# Patient Record
Sex: MALE | Race: WHITE | NOT HISPANIC OR LATINO | Employment: FULL TIME | URBAN - METROPOLITAN AREA
[De-identification: names, ages, dates, MRNs, and addresses within clinical notes are randomized per-mention and may not be internally consistent; named-entity substitution may affect disease eponyms.]

---

## 2021-06-15 ENCOUNTER — APPOINTMENT (EMERGENCY)
Dept: RADIOLOGY | Facility: HOSPITAL | Age: 41
End: 2021-06-15
Payer: COMMERCIAL

## 2021-06-15 ENCOUNTER — HOSPITAL ENCOUNTER (EMERGENCY)
Facility: HOSPITAL | Age: 41
Discharge: HOME | End: 2021-06-15
Attending: EMERGENCY MEDICINE | Admitting: EMERGENCY MEDICINE
Payer: COMMERCIAL

## 2021-06-15 ENCOUNTER — APPOINTMENT (EMERGENCY)
Dept: CT IMAGING | Facility: HOSPITAL | Age: 41
End: 2021-06-15
Payer: COMMERCIAL

## 2021-06-15 VITALS
SYSTOLIC BLOOD PRESSURE: 153 MMHG | DIASTOLIC BLOOD PRESSURE: 96 MMHG | WEIGHT: 210 LBS | HEART RATE: 77 BPM | OXYGEN SATURATION: 98 % | RESPIRATION RATE: 18 BRPM | TEMPERATURE: 98.4 F

## 2021-06-15 DIAGNOSIS — R44.3 HALLUCINATION: Primary | ICD-10-CM

## 2021-06-15 LAB
AMPHETAMINES SERPL QL SCN: NEGATIVE
BARBITURATES UR QL: NEGATIVE
BENZODIAZ UR QL: NEGATIVE
COCAINE UR QL: NEGATIVE
ETHANOL EXG-MCNC: 0 MG/DL
METHADONE UR QL: NEGATIVE
OPIATES UR QL SCN: NEGATIVE
OXYCODONE+OXYMORPHONE UR QL SCN: NEGATIVE
PCP UR QL: NEGATIVE
SARS-COV-2 RNA RESP QL NAA+PROBE: NEGATIVE
THC UR QL: NEGATIVE

## 2021-06-15 PROCEDURE — 70450 CT HEAD/BRAIN W/O DYE: CPT

## 2021-06-15 PROCEDURE — 96372 THER/PROPH/DIAG INJ SC/IM: CPT

## 2021-06-15 PROCEDURE — 82075 ASSAY OF BREATH ETHANOL: CPT

## 2021-06-15 PROCEDURE — 80307 DRUG TEST PRSMV CHEM ANLYZR: CPT | Performed by: EMERGENCY MEDICINE

## 2021-06-15 PROCEDURE — 71045 X-RAY EXAM CHEST 1 VIEW: CPT

## 2021-06-15 PROCEDURE — 99285 EMERGENCY DEPT VISIT HI MDM: CPT | Performed by: EMERGENCY MEDICINE

## 2021-06-15 PROCEDURE — U0003 INFECTIOUS AGENT DETECTION BY NUCLEIC ACID (DNA OR RNA); SEVERE ACUTE RESPIRATORY SYNDROME CORONAVIRUS 2 (SARS-COV-2) (CORONAVIRUS DISEASE [COVID-19]), AMPLIFIED PROBE TECHNIQUE, MAKING USE OF HIGH THROUGHPUT TECHNOLOGIES AS DESCRIBED BY CMS-2020-01-R: HCPCS | Performed by: EMERGENCY MEDICINE

## 2021-06-15 PROCEDURE — 99285 EMERGENCY DEPT VISIT HI MDM: CPT

## 2021-06-15 PROCEDURE — U0005 INFEC AGEN DETEC AMPLI PROBE: HCPCS | Performed by: EMERGENCY MEDICINE

## 2021-06-15 RX ORDER — DIPHENHYDRAMINE HYDROCHLORIDE 50 MG/ML
50 INJECTION INTRAMUSCULAR; INTRAVENOUS ONCE
Status: COMPLETED | OUTPATIENT
Start: 2021-06-15 | End: 2021-06-15

## 2021-06-15 RX ORDER — ATORVASTATIN CALCIUM 40 MG/1
40 TABLET, FILM COATED ORAL DAILY
COMMUNITY
Start: 2021-03-22 | End: 2021-06-30 | Stop reason: HOSPADM

## 2021-06-15 RX ORDER — POLYETHYLENE GLYCOL 3350 17 G/17G
17 POWDER, FOR SOLUTION ORAL DAILY
COMMUNITY
Start: 2021-06-14 | End: 2021-06-30 | Stop reason: HOSPADM

## 2021-06-15 RX ORDER — HYDROXYZINE HYDROCHLORIDE 25 MG/1
25 TABLET, FILM COATED ORAL EVERY 6 HOURS PRN
COMMUNITY
Start: 2021-06-14 | End: 2021-06-30 | Stop reason: HOSPADM

## 2021-06-15 RX ORDER — ZIPRASIDONE MESYLATE 20 MG/ML
20 INJECTION, POWDER, LYOPHILIZED, FOR SOLUTION INTRAMUSCULAR ONCE
Status: COMPLETED | OUTPATIENT
Start: 2021-06-15 | End: 2021-06-15

## 2021-06-15 RX ORDER — KETOROLAC TROMETHAMINE 30 MG/ML
30 INJECTION, SOLUTION INTRAMUSCULAR; INTRAVENOUS ONCE
Status: COMPLETED | OUTPATIENT
Start: 2021-06-15 | End: 2021-06-15

## 2021-06-15 RX ORDER — PREDNISONE 20 MG/1
TABLET ORAL
COMMUNITY
Start: 2021-06-06 | End: 2021-06-15

## 2021-06-15 RX ORDER — ACETAMINOPHEN 500 MG
500 TABLET ORAL EVERY 6 HOURS PRN
COMMUNITY
End: 2021-06-30 | Stop reason: HOSPADM

## 2021-06-15 RX ORDER — ONDANSETRON 4 MG/1
4 TABLET, ORALLY DISINTEGRATING ORAL ONCE
Status: COMPLETED | OUTPATIENT
Start: 2021-06-15 | End: 2021-06-15

## 2021-06-15 RX ORDER — ASPIRIN 81 MG/1
81 TABLET, CHEWABLE ORAL DAILY
COMMUNITY
Start: 2020-12-29 | End: 2021-06-30 | Stop reason: HOSPADM

## 2021-06-15 RX ORDER — DULOXETIN HYDROCHLORIDE 60 MG/1
60 CAPSULE, DELAYED RELEASE ORAL DAILY
COMMUNITY
Start: 2021-03-22 | End: 2021-06-30 | Stop reason: HOSPADM

## 2021-06-15 RX ORDER — LORAZEPAM 2 MG/ML
2 INJECTION INTRAMUSCULAR ONCE
Status: COMPLETED | OUTPATIENT
Start: 2021-06-15 | End: 2021-06-15

## 2021-06-15 RX ADMIN — DIPHENHYDRAMINE HYDROCHLORIDE 50 MG: 50 INJECTION INTRAMUSCULAR; INTRAVENOUS at 00:36

## 2021-06-15 RX ADMIN — KETOROLAC TROMETHAMINE 30 MG: 30 INJECTION, SOLUTION INTRAMUSCULAR at 00:36

## 2021-06-15 RX ADMIN — ONDANSETRON 4 MG: 4 TABLET, ORALLY DISINTEGRATING ORAL at 00:41

## 2021-06-15 RX ADMIN — LORAZEPAM 2 MG: 2 INJECTION INTRAMUSCULAR; INTRAVENOUS at 00:36

## 2021-06-15 RX ADMIN — ZIPRASIDONE MESYLATE 20 MG: 20 INJECTION, POWDER, LYOPHILIZED, FOR SOLUTION INTRAMUSCULAR at 00:35

## 2021-06-15 NOTE — Clinical Note
Consuelo Mtz was seen and treated in our emergency department on 6/15/2021  Diagnosis:     Lucian Last    He may return on this date: 06/18/2021         If you have any questions or concerns, please don't hesitate to call        Nora Ramires RN    ______________________________           _______________          _______________  Hospital Representative                              Date                                Time

## 2021-06-15 NOTE — ED NOTES
Pts wife to go home at this time  Spoke privately with provider and feels as though he would benefit from a crisis consult In the morning due to psychotic behaviour and thoughts at time including hallucinations        Molly Yun RN  06/15/21 4143
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
## 2021-06-15 NOTE — ED NOTES
Per pts wife Felicia Vargas wears CPAP at night , will place on O2 for transient low O2     Bárbara ZACHARIAH Alvarez  06/15/21 2393

## 2021-06-15 NOTE — ED NOTES
Patient in bed resting  The patient states that he is unable to produce a urine sample at this moment  Cup of water given to patient  Patient will give sample when he can  Call bell within reach        Sergio Lock RN  06/15/21 0393

## 2021-06-15 NOTE — ED CARE HANDOFF
Emergency Department Sign Out Note        Sign out and transfer of care from [place] [id number]  See Separate Emergency Department note  The patient, Rae Santana, was evaluated by the previous provider for hallucination  Workup Completed:  UDS/UA    ED Course / Workup Pending (followup): Patient awake alert oriented  Wife at bedside discussed with the crisis worker patient is safe to go home provided with outpatient referral wife feel safe to take him home  Denies any suicidal homicidal ideation  Procedures  MDM    Disposition  Final diagnoses:   Hallucination     Time reflects when diagnosis was documented in both MDM as applicable and the Disposition within this note     Time User Action Codes Description Comment    6/15/2021 10:58 AM Maribeth Hathaway Add [R44 3] Hallucination       ED Disposition     ED Disposition Condition Date/Time Comment    Discharge Stable Tue Ankit 15, 2021 10:58 AM Rae Santana discharge to home/self care  Follow-up Information     Follow up With Specialties Details Why Agatha Wong MD Family Medicine Schedule an appointment as soon as possible for a visit in 2 days If symptoms worsen [address]  [address] [place] [address]  [phone]          Patient's Medications   Discharge Prescriptions    No medications on file     No discharge procedures on file         ED Provider  Electronically Signed by     Eleno Rudolph MD  06/15/21 6413

## 2021-06-15 NOTE — ED PROVIDER NOTES
History  Chief Complaint   Patient presents with    Headache    Abdominal Pain     Patient brought in by ambulance, family called because he was curled up, screaming about his headache and nausea  Pt was seen in past day and evaluated by crisis and dc'd  Pt history and ROS limited by pt refusing to answer many questions, and intermittently roaring out loudly, or deflecting questions with sarcasm  Says he is hearing voices, and seeing things, won't say what, denies command auditory, denies si/hi  Prior to Admission Medications   Prescriptions Last Dose Informant Patient Reported? Taking? Ascorbic Acid, Vitamin C, (VITAMIN C) 100 MG tablet   Yes No   Sig: Take 100 mg by mouth daily   DULoxetine (CYMBALTA) 60 mg delayed release capsule   Yes Yes   Sig: Take 60 mg by mouth daily   Folic Acid-Cholecalciferol 1-5000 MG-UNIT TABS   Yes No   Sig: Take by mouth daily   acetaminophen (TYLENOL) 500 mg tablet   Yes No   Sig: Take 500 mg by mouth every 6 (six) hours as needed   aspirin 81 mg chewable tablet   Yes Yes   Sig: Chew 81 mg daily   atorvastatin (LIPITOR) 40 mg tablet   Yes Yes   Sig: Take 40 mg by mouth daily   hydrOXYzine HCL (ATARAX) 25 mg tablet   Yes Yes   Sig: Take 25 mg by mouth every 6 (six) hours as needed   polyethylene glycol (GLYCOLAX) 17 GM/SCOOP powder   Yes Yes   Sig: Take 17 g by mouth daily   predniSONE 20 mg tablet   Yes No   Sig: Take by mouth      Facility-Administered Medications: None       History reviewed  No pertinent past medical history  History reviewed  No pertinent surgical history  History reviewed  No pertinent family history  I have reviewed and agree with the history as documented      E-Cigarette/Vaping     E-Cigarette/Vaping Substances     Social History     Tobacco Use    Smoking status: Former Smoker    Smokeless tobacco: Never Used   Substance Use Topics    Alcohol use: Not Currently    Drug use: Never       Review of Systems   Unable to perform ROS: Psychiatric disorder       Physical Exam  Physical Exam  Constitutional:       General: He is not in acute distress  HENT:      Head: Normocephalic and atraumatic  Eyes:      Extraocular Movements: Extraocular movements intact  Conjunctiva/sclera: Conjunctivae normal       Pupils: Pupils are equal, round, and reactive to light  Cardiovascular:      Rate and Rhythm: Regular rhythm  Heart sounds: Normal heart sounds  Pulmonary:      Effort: Pulmonary effort is normal       Breath sounds: Normal breath sounds  Abdominal:      General: Bowel sounds are normal       Palpations: Abdomen is soft  There is no mass  Tenderness: There is no abdominal tenderness  There is no guarding  Genitourinary:     Testes:         Right: Tenderness or swelling not present  Left: Tenderness or swelling not present  Musculoskeletal:      Cervical back: Normal range of motion and neck supple  Skin:     General: Skin is warm and dry  Capillary Refill: Capillary refill takes less than 2 seconds  Neurological:      General: No focal deficit present  Mental Status: He is alert  Cranial Nerves: No cranial nerve deficit  Motor: No weakness     Psychiatric:      Comments: Roaring out very loudly, including in the middle of questions to him, intermittently will follow instructions, at other times, lies face down and refuses to follow         Vital Signs  ED Triage Vitals   Temperature Pulse Respirations Blood Pressure SpO2   06/15/21 0019 06/15/21 0019 06/15/21 0019 06/15/21 0019 06/15/21 0019   (!) 97 °F (36 1 °C) (!) 120 (!) 24 136/93 99 %      Temp Source Heart Rate Source Patient Position - Orthostatic VS BP Location FiO2 (%)   06/15/21 0742 06/15/21 0019 06/15/21 0300 06/15/21 0300 --   Oral Monitor Lying Right arm       Pain Score       06/15/21 0019       Worst Possible Pain           Vitals:    06/15/21 0100 06/15/21 0130 06/15/21 0300 06/15/21 0742   BP: 136/82 121/78 113/76 153/96 Pulse: 92 95 85 77   Patient Position - Orthostatic VS:   Lying          Visual Acuity      ED Medications  Medications   ziprasidone (GEODON) IM injection 20 mg (20 mg Intramuscular Given 6/15/21 0035)   diphenhydrAMINE (BENADRYL) injection 50 mg (50 mg Intramuscular Given 6/15/21 0036)   LORazepam (ATIVAN) injection 2 mg (2 mg Intramuscular Given 6/15/21 0036)   ketorolac (TORADOL) injection 30 mg (30 mg Intramuscular Given 6/15/21 0036)   ondansetron (ZOFRAN-ODT) dispersible tablet 4 mg (4 mg Oral Given 6/15/21 0041)       Diagnostic Studies  Results Reviewed     Procedure Component Value Units Date/Time    Rapid drug screen, urine [[id number]]  (Normal) Collected: 06/15/21 0842    Lab Status: Final result Specimen: Urine, Clean Catch Updated: 06/15/21 0857     Amph/Meth UR Negative     Barbiturate Ur Negative     Benzodiazepine Urine Negative     Cocaine Urine Negative     Methadone Urine Negative     Opiate Urine Negative     PCP Ur Negative     THC Urine Negative     Oxycodone Urine Negative    Narrative:      FOR MEDICAL PURPOSES ONLY  IF CONFIRMATION NEEDED PLEASE CONTACT THE LAB WITHIN 5 DAYS  Drug Screen Cutoff Levels:  AMPHETAMINE/METHAMPHETAMINES  1000 ng/mL  BARBITURATES     200 ng/mL  BENZODIAZEPINES     200 ng/mL  COCAINE      300 ng/mL  METHADONE      300 ng/mL  OPIATES      300 ng/mL  PHENCYCLIDINE     25 ng/mL  THC       50 ng/mL  OXYCODONE      100 ng/mL    Novel Coronavirus (Covid-19),PCR [facility] - 2 Hour Stat [[id number]]  (Normal) Collected: 06/15/21 0606    Lab Status: Final result Specimen: Nasopharyngeal Swab Updated: 06/15/21 0720     SARS-CoV-2 Negative    Narrative: The specimen collection materials, transport medium, and/or testing methodology utilized in the production of these test results have been proven to be reliable in a limited validation with an abbreviated program under the Emergency Utilization Authorization provided by the FDA    Testing reported as "Presumptive positive" will be confirmed with secondary testing to ensure result accuracy  Clinical caution and judgement should be used with the interpretation of these results with consideration of the clinical impression and other laboratory testing  Testing reported as "Positive" or "Negative" has been proven to be accurate according to standard laboratory validation requirements  All testing is performed with control materials showing appropriate reactivity at standard intervals  POCT alcohol breath test [[id number]]  (Normal) Resulted: 06/15/21 0610    Lab Status: Final result Updated: 06/15/21 0610     EXTBreath Alcohol 0 000                 CT head without contrast   Final Result by Xiao Dhaliwal MD (06/15 4385)      No acute intracranial hemorrhage, midline shift, or mass effect  Workstation performed: [id number]         XR chest 1 view portable   ED Interpretation by Parth Montalvo MD (06/15 0246)   Clipped left lung but on visible fields no acute abnormalities seen      Final Result by Mando Baltazar MD (06/15 5841)      No acute cardiopulmonary disease  Workstation performed: [id number]                    Procedures  Procedures         ED Course  ED Course as of Ankit 15 2323   Tue Ankit 15, 2021   0121 Wife and sister came to talk with me  Pt has PMH of multiple TBIs, Anxiety, HTN  On Cymbalta  Given prednisone 6/7/21 and after that developed agitation, insomnia, paranoia, anxiety  Believes people at work are plotting against him to ruin his reputation and career  Has also had constipation, although had lare BM 3 days ago  They have had no indications of Si/HI  They were not aware of hallucinations which patient described to me  0246 Medically clear for inpatient psych                                              MDM  Number of Diagnoses or Management Options  Hallucination  Diagnosis management comments: Psychosis, agitation, headache and nausea   No focal neuro findings  Had extensive labs yesterday, not repeated today  Checking CT head for 1st time psychosis  Signed out 0700 to IA      Disposition  Final diagnoses:   Hallucination     Time reflects when diagnosis was documented in both MDM as applicable and the Disposition within this note     Time User Action Codes Description Comment    6/15/2021 10:58 AM Bryan Orosco Add [R44 3] Hallucination       ED Disposition     ED Disposition Condition Date/Time Comment    Discharge Stable Tue Ankit 15, 2021 10:58 AM Gato Ridmc discharge to home/self care              MD Documentation      Most Recent Value   Sending MD Chintan Hassan      Follow-up Information     Follow up With Specialties Details Why Ally Elliott MD Family Medicine Schedule an appointment as soon as possible for a visit in 2 days If symptoms worsen Jose F Matamorosdentawanna [facility] [address] [address]  [phone]            Discharge Medication List as of 6/15/2021 10:58 AM      CONTINUE these medications which have NOT CHANGED    Details   aspirin 81 mg chewable tablet Chew 81 mg daily, Starting Tue 12/29/2020, Until Wed 12/29/2021, Historical Med      atorvastatin (LIPITOR) 40 mg tablet Take 40 mg by mouth daily, Starting Mon 3/22/2021, Until Sun 6/20/2021, Historical Med      DULoxetine (CYMBALTA) 60 mg delayed release capsule Take 60 mg by mouth daily, Starting Mon 3/22/2021, Until Sun 6/20/2021, Historical Med      hydrOXYzine HCL (ATARAX) 25 mg tablet Take 25 mg by mouth every 6 (six) hours as needed, Starting Mon 6/14/2021, Historical Med      polyethylene glycol (GLYCOLAX) 17 GM/SCOOP powder Take 17 g by mouth daily, Starting Mon 6/14/2021, Until Thu 6/17/2021, Historical Med      acetaminophen (TYLENOL) 500 mg tablet Take 500 mg by mouth every 6 (six) hours as needed, Historical Med      Ascorbic Acid, Vitamin C, (VITAMIN C) 100 MG tablet Take 100 mg by mouth daily, Historical Med      Folic Acid-Cholecalciferol 1-5000 MG-UNIT TABS Take by mouth daily, Historical Med      predniSONE 20 mg tablet Take by mouth, Starting Sun 6/6/2021, Until Tue 6/15/2021 at 2359, Historical Med           No discharge procedures on file      PDMP Review     None          ED Provider  Electronically Signed by           Sandra Skaggs MD  06/15/21 5357

## 2021-06-15 NOTE — ED NOTES
[facility]    Name and Date of Birth:  Gato Thompson 36 y o  1980    Date of Referral: Keyla 15, 2021    Presenting Symptoms and Stressors:      Symptoms:  anxiety and mood swings  Stressors:  stress at work and medical problems    Access to Weapons:  No    Smoking Status: denies use    Substance Use:  None    Suicidal Ideation: None    Homicidal Ideation: None    Depressed Mood: depressed mood, low energy, mood swings, irritability    Batool/Hypomania: None    Psychosis: paranoid thoughts, paranoid delusions    Agitation: agitation, severe anxiety, restlessness    Appetite Changes: normal appetite    Sleep Disturbance: decreased sleep    Diagnoses:  1   Intermittent Explosive Disorder    Current Psychiatrist or Therapist:    Psychiatrist: None  Therapist: None    Do they Require Ambulatory Assistance: No    Communication Assistance: not required     Legal Issues: None        Judd Prieto

## 2021-06-15 NOTE — ED NOTES
Pt given sip of water , immediately forcefully spit all over this RN disregarding emesis bag , pt stated "im vomiting"      Gunnar Baer, ZACHARIAH  06/15/21 9769

## 2021-06-15 NOTE — ED NOTES
Pt thrashing around bed , uncooperative with providers exam at times   Pt screaming and cursing , pt yelling repeatedly "where is my wife, what is taking her so f*ckin [id number], RN  06/15/21 0107

## 2021-06-15 NOTE — ED NOTES
Patient given a urine cup and patient was unable to void in the cup       Odalis Guillaume RN  06/15/21 9657

## 2021-06-16 ENCOUNTER — TELEPHONE (OUTPATIENT)
Dept: PSYCHOLOGY | Facility: CLINIC | Age: 41
End: 2021-06-16

## 2021-06-16 NOTE — TELEPHONE ENCOUNTER
Called him again after 3 pm to offer PHP since patient had asked to call after 3 and left voicemail asking to return my call to schedule PHP      Dayna Hernández

## 2021-06-18 ENCOUNTER — HOSPITAL ENCOUNTER (EMERGENCY)
Facility: HOSPITAL | Age: 41
Discharge: HOME | End: 2021-06-19
Attending: INTERNAL MEDICINE | Admitting: INTERNAL MEDICINE
Payer: COMMERCIAL

## 2021-06-18 ENCOUNTER — APPOINTMENT (EMERGENCY)
Dept: CT IMAGING | Facility: HOSPITAL | Age: 41
End: 2021-06-18
Payer: COMMERCIAL

## 2021-06-18 DIAGNOSIS — F41.9 ANXIETY: Primary | ICD-10-CM

## 2021-06-18 LAB
ALBUMIN SERPL BCP-MCNC: 4.6 G/DL (ref 3.5–5.7)
ALP SERPL-CCNC: 71 U/L (ref 40–150)
ALT SERPL W P-5'-P-CCNC: 21 U/L (ref 7–52)
AMPHETAMINES SERPL QL SCN: NEGATIVE
ANION GAP SERPL CALCULATED.3IONS-SCNC: 9 MMOL/L (ref 4–13)
AST SERPL W P-5'-P-CCNC: 21 U/L (ref 13–39)
BARBITURATES UR QL: NEGATIVE
BASOPHILS # BLD AUTO: 0 THOUSANDS/ΜL (ref 0–0.1)
BASOPHILS NFR BLD AUTO: 0 % (ref 0–2)
BENZODIAZ UR QL: NEGATIVE
BILIRUB SERPL-MCNC: 1 MG/DL (ref 0.2–1)
BILIRUB UR QL STRIP: NEGATIVE
BUN SERPL-MCNC: 8 MG/DL (ref 7–25)
CALCIUM SERPL-MCNC: 9.5 MG/DL (ref 8.6–10.5)
CHLORIDE SERPL-SCNC: 99 MMOL/L (ref 98–107)
CLARITY UR: CLEAR
CO2 SERPL-SCNC: 28 MMOL/L (ref 21–31)
COCAINE UR QL: NEGATIVE
COLOR UR: ABNORMAL
CREAT SERPL-MCNC: 0.97 MG/DL (ref 0.7–1.3)
EOSINOPHIL # BLD AUTO: 0.2 THOUSAND/ΜL (ref 0–0.61)
EOSINOPHIL NFR BLD AUTO: 2 % (ref 0–5)
ERYTHROCYTE [DISTWIDTH] IN BLOOD BY AUTOMATED COUNT: 13.2 % (ref 11.5–14.5)
ETHANOL SERPL-MCNC: <10 MG/DL
GFR SERPL CREATININE-BSD FRML MDRD: 97 ML/MIN/1.73SQ M
GLUCOSE SERPL-MCNC: 113 MG/DL (ref 65–99)
GLUCOSE UR STRIP-MCNC: NEGATIVE MG/DL
HCT VFR BLD AUTO: 43.1 % (ref 42–47)
HGB BLD-MCNC: 14.5 G/DL (ref 14–18)
HGB UR QL STRIP.AUTO: NEGATIVE
KETONES UR STRIP-MCNC: NEGATIVE MG/DL
LACTATE SERPL-SCNC: 1.2 MMOL/L (ref 0.5–2)
LEUKOCYTE ESTERASE UR QL STRIP: NEGATIVE
LIPASE SERPL-CCNC: 60 U/L (ref 11–82)
LYMPHOCYTES # BLD AUTO: 2.3 THOUSANDS/ΜL (ref 0.6–4.47)
LYMPHOCYTES NFR BLD AUTO: 18 % (ref 21–51)
MCH RBC QN AUTO: 30.5 PG (ref 26–34)
MCHC RBC AUTO-ENTMCNC: 33.6 G/DL (ref 31–37)
MCV RBC AUTO: 91 FL (ref 81–99)
METHADONE UR QL: NEGATIVE
MONOCYTES # BLD AUTO: 1 THOUSAND/ΜL (ref 0.17–1.22)
MONOCYTES NFR BLD AUTO: 8 % (ref 2–12)
NEUTROPHILS # BLD AUTO: 9.1 THOUSANDS/ΜL (ref 1.4–6.5)
NEUTS SEG NFR BLD AUTO: 72 % (ref 42–75)
NITRITE UR QL STRIP: NEGATIVE
OPIATES UR QL SCN: NEGATIVE
OXYCODONE+OXYMORPHONE UR QL SCN: NEGATIVE
PCP UR QL: NEGATIVE
PH UR STRIP.AUTO: 7 [PH]
PLATELET # BLD AUTO: 288 THOUSANDS/UL (ref 149–390)
PMV BLD AUTO: 7.4 FL (ref 8.6–11.7)
POTASSIUM SERPL-SCNC: 3.8 MMOL/L (ref 3.5–5.5)
PROT SERPL-MCNC: 7.2 G/DL (ref 6.4–8.9)
PROT UR STRIP-MCNC: NEGATIVE MG/DL
RBC # BLD AUTO: 4.75 MILLION/UL (ref 4.3–5.9)
SODIUM SERPL-SCNC: 136 MMOL/L (ref 134–143)
SP GR UR STRIP.AUTO: <=1.005 (ref 1–1.03)
THC UR QL: NEGATIVE
TSH SERPL DL<=0.05 MIU/L-ACNC: 1.04 UIU/ML (ref 0.45–5.33)
UROBILINOGEN UR QL STRIP.AUTO: 0.2 E.U./DL
WBC # BLD AUTO: 12.6 THOUSAND/UL (ref 4.8–10.8)

## 2021-06-18 PROCEDURE — 99285 EMERGENCY DEPT VISIT HI MDM: CPT

## 2021-06-18 PROCEDURE — 83605 ASSAY OF LACTIC ACID: CPT | Performed by: INTERNAL MEDICINE

## 2021-06-18 PROCEDURE — 81003 URINALYSIS AUTO W/O SCOPE: CPT | Performed by: INTERNAL MEDICINE

## 2021-06-18 PROCEDURE — 85025 COMPLETE CBC W/AUTO DIFF WBC: CPT | Performed by: INTERNAL MEDICINE

## 2021-06-18 PROCEDURE — 74177 CT ABD & PELVIS W/CONTRAST: CPT

## 2021-06-18 PROCEDURE — 99285 EMERGENCY DEPT VISIT HI MDM: CPT | Performed by: INTERNAL MEDICINE

## 2021-06-18 PROCEDURE — 36415 COLL VENOUS BLD VENIPUNCTURE: CPT | Performed by: INTERNAL MEDICINE

## 2021-06-18 PROCEDURE — 82077 ASSAY SPEC XCP UR&BREATH IA: CPT | Performed by: INTERNAL MEDICINE

## 2021-06-18 PROCEDURE — 80053 COMPREHEN METABOLIC PANEL: CPT | Performed by: INTERNAL MEDICINE

## 2021-06-18 PROCEDURE — 93005 ELECTROCARDIOGRAM TRACING: CPT

## 2021-06-18 PROCEDURE — 80307 DRUG TEST PRSMV CHEM ANLYZR: CPT | Performed by: INTERNAL MEDICINE

## 2021-06-18 PROCEDURE — 96374 THER/PROPH/DIAG INJ IV PUSH: CPT

## 2021-06-18 PROCEDURE — G1004 CDSM NDSC: HCPCS

## 2021-06-18 PROCEDURE — 84443 ASSAY THYROID STIM HORMONE: CPT | Performed by: INTERNAL MEDICINE

## 2021-06-18 PROCEDURE — 83690 ASSAY OF LIPASE: CPT | Performed by: INTERNAL MEDICINE

## 2021-06-18 RX ORDER — LORAZEPAM 2 MG/ML
1 INJECTION INTRAMUSCULAR ONCE
Status: COMPLETED | OUTPATIENT
Start: 2021-06-18 | End: 2021-06-18

## 2021-06-18 RX ADMIN — IOHEXOL 100 ML: 350 INJECTION, SOLUTION INTRAVENOUS at 21:18

## 2021-06-18 RX ADMIN — LORAZEPAM 1 MG: 2 INJECTION INTRAMUSCULAR; INTRAVENOUS at 18:19

## 2021-06-18 NOTE — ED PROVIDER NOTES
History  Chief Complaint   Patient presents with    Psychiatric Evaluation     PT STATES WAS DX WITH MEDICATION INDUCED PSYCHOSIS BY PCP , states he is hallucinating , hearing voices, has "lost all control of body" , states has headache      36YEAR-OLD MALE ACCOMPANIED BY HIS WIFE PRESENTS WITH MULTIPLE MEDICAL COMPLAINTS  Yusef Grady Patient states he is hypersensitive to any noise he states he can barely walk across the carpet without crane gene, he also hears noises that her for away as if there in his ear  His wife believes he is also having auditory hallucinations which he denies  Does complain of headache, also complaining of abdominal pain intermittently with constipation his last bowel movement large bowel movement was Tuesday night into Wednesday morning when he defecated on himself  PATIENT STATES HE HAS NOT HAD A GOOD BOWEL MOVEMENT SINCE AND HAS INTERMITTENT DIFFUSE ABDOMINAL PAIN  The patient is also complaining of urinary frequency urgency with burning upon urination  He has no flank pain, no nausea vomiting or fever  He denies cough, respiratory difficulty chest pain  He states his problem started when he was put on prednisone for poison ivy the prednisone was stopped a few days ago  Today he took double his amount of medication by accident as per his wife and the patient  Prior to Admission Medications   Prescriptions Last Dose Informant Patient Reported? Taking?    Ascorbic Acid, Vitamin C, (VITAMIN C) 100 MG tablet   Yes No   Sig: Take 100 mg by mouth daily   DULoxetine (CYMBALTA) 60 mg delayed release capsule   Yes No   Sig: Take 60 mg by mouth daily   Folic Acid-Cholecalciferol 1-5000 MG-UNIT TABS   Yes No   Sig: Take by mouth daily   acetaminophen (TYLENOL) 500 mg tablet   Yes No   Sig: Take 500 mg by mouth every 6 (six) hours as needed   aspirin 81 mg chewable tablet   Yes No   Sig: Chew 81 mg daily   atorvastatin (LIPITOR) 40 mg tablet   Yes No   Sig: Take 40 mg by mouth daily hydrOXYzine HCL (ATARAX) 25 mg tablet   Yes No   Sig: Take 25 mg by mouth every 6 (six) hours as needed   polyethylene glycol (GLYCOLAX) 17 GM/SCOOP powder   Yes No   Sig: Take 17 g by mouth daily      Facility-Administered Medications: None       No past medical history on file  No past surgical history on file  No family history on file  I have reviewed and agree with the history as documented  E-Cigarette/Vaping     E-Cigarette/Vaping Substances     Social History     Tobacco Use    Smoking status: Former Smoker    Smokeless tobacco: Never Used   Substance Use Topics    Alcohol use: Not Currently    Drug use: Never       Review of Systems   Constitutional: Positive for activity change and fatigue  HENT: Negative  Respiratory: Negative  Gastrointestinal: Positive for abdominal pain and constipation  Negative for nausea and vomiting  Genitourinary: Positive for difficulty urinating, frequency and urgency  Skin: Negative  Neurological: Positive for headaches  Hematological: Negative  Psychiatric/Behavioral: Positive for hallucinations and sleep disturbance  The patient is nervous/anxious  Physical Exam  Physical Exam  Vitals and nursing note reviewed  Exam conducted with a chaperone present  Constitutional:       General: He is not in acute distress  Appearance: Normal appearance  He is normal weight  He is not ill-appearing  HENT:      Head: Normocephalic and atraumatic  Right Ear: Tympanic membrane normal       Left Ear: Tympanic membrane normal       Mouth/Throat:      Mouth: Mucous membranes are dry  Eyes:      Comments: Right eye is absent  Cardiovascular:      Rate and Rhythm: Normal rate and regular rhythm  Pulses: Normal pulses  Heart sounds: Normal heart sounds  Pulmonary:      Effort: Pulmonary effort is normal       Breath sounds: Normal breath sounds  Abdominal:      General: Abdomen is flat   Bowel sounds are normal  Palpations: Abdomen is soft  Musculoskeletal:         General: Normal range of motion  Cervical back: Normal range of motion and neck supple  Skin:     General: Skin is warm and dry  Neurological:      General: No focal deficit present  Mental Status: He is alert and oriented to person, place, and time  Psychiatric:         Mood and Affect: Mood normal          Thought Content: Thought content normal          Judgment: Judgment normal          Vital Signs  ED Triage Vitals [06/18/21 1729]   Temperature Pulse Respirations Blood Pressure SpO2   98 °F (36 7 °C) 92 18 (!) 159/103 100 %      Temp src Heart Rate Source Patient Position - Orthostatic VS BP Location FiO2 (%)   -- Monitor -- Left arm --      Pain Score       --           Vitals:    06/18/21 1729   BP: (!) 159/103   Pulse: 92         Visual Acuity      ED Medications  Medications - No data to display    Diagnostic Studies  Results Reviewed     None                 No orders to display              Procedures  ECG 12 Lead Documentation Only    Date/Time: 6/18/2021 8:58 PM  Performed by: Kera Oneal MD  Authorized by: Kera Oneal MD     Indications / Diagnosis:  MS change  ECG reviewed by me, the ED Provider: yes    Patient location:  ED  Previous ECG:     Previous ECG:  Compared to current    Similarity:  No change  Interpretation:     Interpretation: normal    Rate:     ECG rate:      ECG rate assessment: normal    Rhythm:     Rhythm: sinus rhythm    Ectopy:     Ectopy: none    QRS:     QRS axis:  Normal  Conduction:     Conduction: normal    ST segments:     ST segments:  Normal  T waves:     T waves: non-specific               ED Course                                           MDM    Disposition  Final diagnoses:   None     ED Disposition     None      Follow-up Information    None         Patient's Medications   Discharge Prescriptions    No medications on file     No discharge procedures on file      PDMP Review     None ED Provider  Electronically Signed by           Nicole Saeed MD  06/18/21 4471

## 2021-06-19 VITALS
DIASTOLIC BLOOD PRESSURE: 93 MMHG | TEMPERATURE: 98 F | HEART RATE: 112 BPM | OXYGEN SATURATION: 100 % | RESPIRATION RATE: 19 BRPM | WEIGHT: 210 LBS | SYSTOLIC BLOOD PRESSURE: 137 MMHG

## 2021-06-19 LAB
ATRIAL RATE: 104 BPM
ATRIAL RATE: 98 BPM
P AXIS: 31 DEGREES
P AXIS: 39 DEGREES
PR INTERVAL: 138 MS
PR INTERVAL: 148 MS
QRS AXIS: 32 DEGREES
QRS AXIS: 35 DEGREES
QRSD INTERVAL: 76 MS
QRSD INTERVAL: 80 MS
QT INTERVAL: 350 MS
QT INTERVAL: 360 MS
QTC INTERVAL: 459 MS
QTC INTERVAL: 460 MS
T WAVE AXIS: 11 DEGREES
T WAVE AXIS: 6 DEGREES
VENTRICULAR RATE: 104 BPM
VENTRICULAR RATE: 98 BPM

## 2021-06-19 PROCEDURE — 96376 TX/PRO/DX INJ SAME DRUG ADON: CPT

## 2021-06-19 PROCEDURE — 93010 ELECTROCARDIOGRAM REPORT: CPT | Performed by: INTERNAL MEDICINE

## 2021-06-19 RX ORDER — LORAZEPAM 2 MG/ML
1 INJECTION INTRAMUSCULAR ONCE
Status: COMPLETED | OUTPATIENT
Start: 2021-06-19 | End: 2021-06-19

## 2021-06-19 RX ADMIN — LORAZEPAM 1 MG: 2 INJECTION INTRAMUSCULAR; INTRAVENOUS at 01:24

## 2021-06-19 NOTE — ED NOTES
Patient reports he hasn't been sleeping well so if he falls asleep - please don't awake him and let him sleep     Anthony Alva RN  06/19/21 3664

## 2021-06-19 NOTE — ED NOTES
Pennie Suicide Risk Assessment deferred, as unable to assess while patient sleeping  Behavioral Health Assessment deferred as patient is sleeping and would benefit from additional rest   Vital signs deferred until patient awake, no signs or symptoms of respiratory distress at this time  Once patient is awake and able to participate, will complete assessments         Mike Montes RN  06/19/21 2488
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
## 2021-06-19 NOTE — ED NOTES
[place] PROMISe indicates: Active  Recipient #[id number]  [facility] managed by [facility]  Phone number: [phone]  Primary payor is [address]  Eligibility verified via [address] [place] [address] managed by [employer]  Phone number: [phone]

## 2021-06-19 NOTE — ED NOTES
Requested to speak to MD - MD at bedside - patient requested more food - will provide with more food     Celsa Meyers RN  06/19/21 0041

## 2021-06-19 NOTE — ED NOTES
Patient requested to get oob to go to the bathroom to poop - assisted to bathroom and back to bed at his request     Juan Ramon Love RN  06/19/21 2752

## 2021-06-19 NOTE — ED NOTES
Patient requested food - patient reports his mind is racing and he thinks his sister n law was here - food was provided - his urinal emptied     Antoni Graff RN  06/19/21 0026

## 2021-06-19 NOTE — ED NOTES
Patient's wife will be leaving for the night - she reports at times he has nightmares and screams - sometimes she can wake him up and sometimes she can't when this happens     Celso Ferrell RN  06/18/21 3292

## 2021-06-19 NOTE — DISCHARGE INSTRUCTIONS
Follow all instructions given by the crisis team, return to er with any other concerns, thoughts of self harm or hurting others

## 2021-06-19 NOTE — ED NOTES
Patient ate his food and went to bathroom and cleaned up room - talking about the job site and how it gave him super sensitive hearing - he reports he could hear a rain hammer in his head - instructed patient on time of day and medicated so he relax - sleep     Josi Araujo RN  06/19/21 0123

## 2021-06-20 ENCOUNTER — HOSPITAL ENCOUNTER (EMERGENCY)
Facility: HOSPITAL | Age: 41
End: 2021-06-21
Attending: EMERGENCY MEDICINE | Admitting: EMERGENCY MEDICINE
Payer: COMMERCIAL

## 2021-06-20 DIAGNOSIS — F22 PARANOIA (HCC): ICD-10-CM

## 2021-06-20 DIAGNOSIS — F22 DELUSIONS (HCC): ICD-10-CM

## 2021-06-20 DIAGNOSIS — Z00.8 ENCOUNTER FOR PSYCHOLOGICAL EVALUATION: Primary | ICD-10-CM

## 2021-06-20 DIAGNOSIS — F32.3 SEVERE MAJOR DEPRESSION WITH PSYCHOTIC FEATURES (HCC): ICD-10-CM

## 2021-06-20 LAB
AMPHETAMINES SERPL QL SCN: NEGATIVE
BARBITURATES UR QL: NEGATIVE
BENZODIAZ UR QL: NEGATIVE
BILIRUB UR QL STRIP: NEGATIVE
CLARITY UR: CLEAR
COCAINE UR QL: NEGATIVE
COLOR UR: NORMAL
ETHANOL EXG-MCNC: 0 MG/DL
GLUCOSE UR STRIP-MCNC: NEGATIVE MG/DL
HGB UR QL STRIP.AUTO: NEGATIVE
KETONES UR STRIP-MCNC: NEGATIVE MG/DL
LEUKOCYTE ESTERASE UR QL STRIP: NEGATIVE
METHADONE UR QL: NEGATIVE
NITRITE UR QL STRIP: NEGATIVE
OPIATES UR QL SCN: NEGATIVE
OXYCODONE+OXYMORPHONE UR QL SCN: NEGATIVE
PCP UR QL: NEGATIVE
PH UR STRIP.AUTO: 7 [PH]
PROT UR STRIP-MCNC: NEGATIVE MG/DL
SARS-COV-2 RNA RESP QL NAA+PROBE: NEGATIVE
SP GR UR STRIP.AUTO: 1.01 (ref 1–1.04)
THC UR QL: NEGATIVE
UROBILINOGEN UA: NEGATIVE MG/DL

## 2021-06-20 PROCEDURE — 82075 ASSAY OF BREATH ETHANOL: CPT | Performed by: EMERGENCY MEDICINE

## 2021-06-20 PROCEDURE — 80307 DRUG TEST PRSMV CHEM ANLYZR: CPT | Performed by: EMERGENCY MEDICINE

## 2021-06-20 PROCEDURE — U0005 INFEC AGEN DETEC AMPLI PROBE: HCPCS | Performed by: EMERGENCY MEDICINE

## 2021-06-20 PROCEDURE — 99284 EMERGENCY DEPT VISIT MOD MDM: CPT | Performed by: EMERGENCY MEDICINE

## 2021-06-20 PROCEDURE — U0003 INFECTIOUS AGENT DETECTION BY NUCLEIC ACID (DNA OR RNA); SEVERE ACUTE RESPIRATORY SYNDROME CORONAVIRUS 2 (SARS-COV-2) (CORONAVIRUS DISEASE [COVID-19]), AMPLIFIED PROBE TECHNIQUE, MAKING USE OF HIGH THROUGHPUT TECHNOLOGIES AS DESCRIBED BY CMS-2020-01-R: HCPCS | Performed by: EMERGENCY MEDICINE

## 2021-06-20 PROCEDURE — 99285 EMERGENCY DEPT VISIT HI MDM: CPT

## 2021-06-20 PROCEDURE — 81003 URINALYSIS AUTO W/O SCOPE: CPT | Performed by: EMERGENCY MEDICINE

## 2021-06-20 RX ORDER — LORAZEPAM 1 MG/1
2 TABLET ORAL ONCE
Status: COMPLETED | OUTPATIENT
Start: 2021-06-20 | End: 2021-06-20

## 2021-06-20 RX ORDER — BENZTROPINE MESYLATE 0.5 MG/1
1 TABLET ORAL ONCE
Status: COMPLETED | OUTPATIENT
Start: 2021-06-20 | End: 2021-06-20

## 2021-06-20 RX ORDER — OLANZAPINE 5 MG/1
10 TABLET, ORALLY DISINTEGRATING ORAL ONCE
Status: COMPLETED | OUTPATIENT
Start: 2021-06-20 | End: 2021-06-20

## 2021-06-20 RX ADMIN — OLANZAPINE 10 MG: 5 TABLET, ORALLY DISINTEGRATING ORAL at 13:43

## 2021-06-20 RX ADMIN — LORAZEPAM 2 MG: 1 TABLET ORAL at 13:43

## 2021-06-20 RX ADMIN — BENZTROPINE MESYLATE 1 MG: 0.5 TABLET ORAL at 13:43

## 2021-06-20 NOTE — ED NOTES
Call received from the patient's wife  She stated she wanted to point out that he has been constipated and that he does not have his normal body odor, that she stated it is different  She also stated her mother may be calling  Her name is [employer]   She has the patient's children, ages 15, 15 and 8 as the patient does not want his children to see him in this state  His children would like to call him and wish him a happy father's day and express their support  Notified patient's nurse of same

## 2021-06-20 NOTE — ED NOTES
Patient was brought to the ED by his wife, Genny Hobson [phone], and his sister, Tonja Navas, [phone]  Patient is a limited historian due to current mental status  He had presented to several ED's recently  Patient has a history of depression, PTSD and a history of multiple head traumas  According to the patient sister, the patient has been experiencing for the past 2 weeks, decreased sleep, decreased appetite, agitation, auditory hallucintions and paranoid delusions  He reportedly believes his home is bugged, that his coworkers are conspiring against him due to his involvement with the [employer]  He is also fixated on the corona virus, mask-wearing and testing  In the ED, he was intially quite agitated as he was unsure if we were going to send him home  He had difficulty expressing himself, using approximate words and fragmented ideas  "Covid, put my mask up, nukes, nukes, nukes  Fara Nissen Karrie Nissen Use a prick of blood or saliva  Karrie Nissen Karrie Nissen I want the other katya who was in office, not this one  I'm not a sheep " The patient and his wife seem convinced that these new experiences are related to prednisone dosing for poison ivy or oak; He has been off of this, however  His sister reports complaints of tongue swelling, painful urination, rash and excessive thirst  The couple also suspected a reaction to cymbalta, however, he has been on that medication for at least 3 months  Patient has reportedly been so paranoid that he darted out of a store and down a street becauses he believed he was being followed and was in danger  Today, family used the child locks and put him in the back seat of the vehicle due to fear he would jump out  Patient was referred to [facility] several days ago; however, it seems that he would be unable to tolerate such a setting and to participate due to current mental status   It is notable that the patient has had some significant head trauma but has been resistant to revealing this due to 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

## 2021-06-20 NOTE — ED PROVIDER NOTES
History  Chief Complaint   Patient presents with    Psychiatric Evaluation     Pt presents with paranoid ideations, AH/VH post taking prednisone for 6 days for rash that is still present  No SI/HI  35 yo male with a history of anxiety and HTN brought to the ED by family for a psychiatric evaluation  The patient reports paranoia, delusions, and hallucinations x several days  He says that he feels like he is being followed and hear "voices" even when he is alone  (+) Increased forgetfulness/confusion/fatigue per wife  The patient and his wife sent their 3 children away to stay with relatives today because they are afraid the he might "do something to them"  No current SI or HI  The patient denies any recent alcohol or substance abuse  (+) Secondary complaints of dysuria x "months" and a lower extremity rash  No other specific complaints  Of note, the patient was seen in a different ED in [facility] for similar complaints 2 days ago  He was referred to a partial program but has not yet followed up  Prior to Admission Medications   Prescriptions Last Dose Informant Patient Reported? Taking?    Ascorbic Acid, Vitamin C, (VITAMIN C) 100 MG tablet   Yes No   Sig: Take 100 mg by mouth daily   DULoxetine (CYMBALTA) 60 mg delayed release capsule   Yes No   Sig: Take 60 mg by mouth daily   Folic Acid-Cholecalciferol 1-5000 MG-UNIT TABS   Yes No   Sig: Take by mouth daily   acetaminophen (TYLENOL) 500 mg tablet   Yes No   Sig: Take 500 mg by mouth every 6 (six) hours as needed   aspirin 81 mg chewable tablet   Yes No   Sig: Chew 81 mg daily   atorvastatin (LIPITOR) 40 mg tablet   Yes No   Sig: Take 40 mg by mouth daily   hydrOXYzine HCL (ATARAX) 25 mg tablet   Yes No   Sig: Take 25 mg by mouth every 6 (six) hours as needed   polyethylene glycol (GLYCOLAX) 17 GM/SCOOP powder   Yes No   Sig: Take 17 g by mouth daily      Facility-Administered Medications: None       Past Medical History:   Diagnosis Date    Anxiety  Hypertension        Past Surgical History:   Procedure Laterality Date    CORNEAL TRANSPLANT      EYE SURGERY      TONSILLECTOMY         History reviewed  No pertinent family history  I have reviewed and agree with the history as documented  E-Cigarette/Vaping     E-Cigarette/Vaping Substances     Social History     Tobacco Use    Smoking status: Former Smoker    Smokeless tobacco: Never Used   Substance Use Topics    Alcohol use: Not Currently    Drug use: Never       Review of Systems   Constitutional: Negative for chills and fever  HENT: Negative for sore throat  Respiratory: Negative for cough and shortness of breath  Cardiovascular: Negative for chest pain and palpitations  Gastrointestinal: Negative for abdominal pain, diarrhea, nausea and vomiting  Endocrine: Negative for cold intolerance and heat intolerance  Genitourinary: Positive for dysuria  Negative for flank pain  Musculoskeletal: Negative for back pain  Skin: Positive for rash  Allergic/Immunologic: Negative for immunocompromised state  Neurological: Negative for headaches  Hematological: Negative for adenopathy  Psychiatric/Behavioral: Positive for confusion, decreased concentration, dysphoric mood, hallucinations and sleep disturbance  Negative for self-injury and suicidal ideas  The patient is nervous/anxious  Physical Exam  Physical Exam  Constitutional:       General: He is not in acute distress  Appearance: He is well-developed  HENT:      Head: Normocephalic and atraumatic  Eyes:      Pupils: Pupils are equal, round, and reactive to light  Cardiovascular:      Rate and Rhythm: Normal rate and regular rhythm  Pulmonary:      Effort: Pulmonary effort is normal  No respiratory distress  Breath sounds: Normal breath sounds  Abdominal:      General: There is no distension  Palpations: Abdomen is soft  Tenderness: There is no abdominal tenderness     Musculoskeletal: General: Normal range of motion  Cervical back: Normal range of motion and neck supple  Skin:     General: Skin is warm and dry  Neurological:      Mental Status: He is alert and oriented to person, place, and time  Psychiatric:         Mood and Affect: Mood is anxious  Speech: Speech is rapid and pressured  Behavior: Behavior is cooperative  Thought Content: Thought content is paranoid and delusional  Thought content does not include homicidal or suicidal ideation  Cognition and Memory: Cognition is impaired  Memory is impaired  Judgment: Judgment is impulsive  Vital Signs  ED Triage Vitals [06/20/21 1140]   Temperature Pulse Respirations Blood Pressure SpO2   98 9 °F (37 2 °C) 100 18 149/90 100 %      Temp Source Heart Rate Source Patient Position - Orthostatic VS BP Location FiO2 (%)   Tympanic Monitor Sitting Left arm --      Pain Score       --           Vitals:    06/20/21 1140   BP: 149/90   Pulse: 100   Patient Position - Orthostatic VS: Sitting         Visual Acuity      ED Medications  Medications   LORazepam (ATIVAN) tablet 2 mg (2 mg Oral Given 6/20/21 1343)   OLANZapine (ZyPREXA ZYDIS) dispersible tablet 10 mg (10 mg Oral Given 6/20/21 1343)   benztropine (COGENTIN) tablet 1 mg (1 mg Oral Given 6/20/21 1343)       Diagnostic Studies  Results Reviewed     Procedure Component Value Units Date/Time    Novel Coronavirus (Covid-19),PCR [facility] - 2 hour stat [[id number]] Collected: 06/20/21 1437    Lab Status:  In process Specimen: Nares from Nose Updated: 06/20/21 1443    Rapid drug screen, urine [[id number]]  (Normal) Collected: 06/20/21 1330    Lab Status: Final result Specimen: Urine, Clean Catch Updated: 06/20/21 1411     Amph/Meth UR Negative     Barbiturate Ur Negative     Benzodiazepine Urine Negative     Cocaine Urine Negative     Methadone Urine Negative     Opiate Urine Negative     PCP Ur Negative     THC Urine Negative     Oxycodone Urine Negative    Narrative:      FOR MEDICAL PURPOSES ONLY  IF CONFIRMATION NEEDED PLEASE CONTACT THE LAB WITHIN 5 DAYS  Drug Screen Cutoff Levels:  AMPHETAMINE/METHAMPHETAMINES  1000 ng/mL  BARBITURATES     200 ng/mL  BENZODIAZEPINES     200 ng/mL  COCAINE      300 ng/mL  METHADONE      300 ng/mL  OPIATES      300 ng/mL  PHENCYCLIDINE     25 ng/mL  THC       50 ng/mL  OXYCODONE      100 ng/mL    UA w Reflex to Microscopic w Reflex to Culture [[id number]]  (Normal) Collected: 06/20/21 1330    Lab Status: Final result Specimen: Urine, Clean Catch Updated: 06/20/21 1354     Color, UA Straw     Clarity, UA Clear     Specific Gravity, UA 1 010     pH, UA 7 0     Leukocytes, UA Negative     Nitrite, UA Negative     Protein, UA Negative mg/dl      Glucose, UA Negative mg/dl      Ketones, UA Negative mg/dl      Bilirubin, UA Negative     Blood, UA Negative     UROBILINOGEN UA Negative mg/dL     POCT alcohol breath test [[id number]]  (Normal) Resulted: 06/20/21 1328    Lab Status: Final result Updated: 06/20/21 1328     EXTBreath Alcohol 0 00                 No orders to display              Procedures  Procedures         ED Course                             SBIRT 20yo+      Most Recent Value   SBIRT (23 yo +)   In order to provide better care to our patients, we are screening all of our patients for alcohol and drug use  Would it be okay to ask you these screening questions? No Filed at: 06/20/2021 1308                    MDM  Number of Diagnoses or Management Options  Delusions ([facility] [address] )  [facility]  [facility]  Diagnosis management comments: The patient is obviously anxious but otherwise well appearing with stable vital signs and a benign exam  He is requesting admission to an inpatient psychiatric facility  Case discussed with Crisis --> they will evaluate the patient in the ED  Disposition per crisis worker recommendations         Amount and/or Complexity of Data Reviewed  Clinical lab tests: ordered and reviewed    Patient Progress  Patient progress: stable      Disposition  Final diagnoses:   Encounter for psychological evaluation   Paranoia ([facility] [address] )   Delusions ([facility] )     Time reflects when diagnosis was documented in both MDM as applicable and the Disposition within this note     Time User Action Codes Description Comment    6/20/2021  2:35 PM Gabriel Rodriguez Add [Z00 8] Encounter for psychological evaluation     6/20/2021  2:36 PM Gabriel Rodriguez Add [F22] Paranoia ([facility] )     6/20/2021  2:36 PM Jesusita Ayon Add [F22] Delusions [facility])       ED Disposition     ED Disposition Condition Date/Time Comment    Transfer to [facility] Jun 20, 2021  2:36 PM Dion Watson should be transferred out to inpatient psychiatry and has been medically cleared  Follow-up Information    None         Patient's Medications   Discharge Prescriptions    No medications on file     No discharge procedures on file      PDMP Review     None          ED Provider  Electronically Signed by           Bryan Langston MD  06/20/21 2517

## 2021-06-20 NOTE — ED NOTES
Safety tray provided, pt was sleeping when the tray was brought in     [facility]  06/20/21 4685

## 2021-06-20 NOTE — ED NOTES
Pt annoyed that his visitor has to leave once he is swabbed for Covid  Crisis in speaking with the pt now        Lesa Lloyd RN  06/20/21 3155

## 2021-06-20 NOTE — LETTER
[facility] EMERGENCY DEPARTMENT  [address] [place] [id number]  [phone]  Dept: [address] [facility] CONSENT    NAME Rosalie Wood                                        YASMANI 1980                              MRN [id number]    I have been informed of my rights regarding examination, treatment, and transfer   by Dr Tiera Bethea MD    Benefits:  inpatient treatment    Risks:  delay in care       { ED EMTALA TRANSFER CHOICES:[id number]}    I authorize the performance of emergency medical procedures and treatments upon me in both transit and upon arrival at the receiving facility  Additionally, I authorize the release of any and all medical records to the receiving facility and request they be transported with me, if possible  I understand that the safest mode of transportation during a medical emergency is an ambulance and that the Hospital advocates the use of this mode of transport  Risks of traveling to the receiving facility by car, including absence of medical control, life sustaining equipment, such as oxygen, and medical personnel has been explained to me and I fully understand them  (TAL CORRECT BOX BELOW)  [x  ]  I consent to the stated transfer and to be transported by ambulance/helicopter  [  ]  I consent to the stated transfer, but refuse transportation by ambulance and accept full responsibility for my transportation by car    I understand the risks of non-ambulance transfers and I exonerate the Hospital and its staff from any deterioration in my condition that results from this refusal     X___________________________________________    DATE  21  TIME________  Signature of patient or legally responsible individual signing on patient behalf           RELATIONSHIP TO PATIENT___self______________________          Provider Certification    NAME Rosalie Wood                                        Y[birth year]MANI 1980 MRN [id number]    A medical screening exam was performed on the above named patient  Based on the examination:    Condition Necessitating Transfer The primary encounter diagnosis was Encounter for psychological evaluation  Diagnoses of Paranoia ([address] ) and Delusions ([facility] [address] ) were also pertinent to this visit  Patient Condition:  depression with psychosis    Reason for Transfer:  inpatient admission     Transfer Requirements: Facility   [facility]   · Space available and qualified personnel available for treatment as acknowledged by  RASHID ABRAMS  · Agreed to accept transfer and to provide appropriate medical treatment as acknowledged by        RASHID ABRAMS  · Appropriate medical records of the examination and treatment of the patient are provided at the time of transfer   [facility] [address] ____ap___  · Transfer will be performed by qualified personnel from  [address] [place] [address]   and appropriate transfer equipment as required, including the use of necessary and appropriate life support measures  Provider Certification: I have examined the patient and explained the following risks and benefits of being transferred/refusing transfer to the patient/family:     Voluntary inpatient admission    Based on these reasonable risks and benefits to the patient and/or the unborn child(beverly), and based upon the information available at the time of the patients examination, I certify that the medical benefits reasonably to be expected from the provision of appropriate medical treatments at another medical facility outweigh the increasing risks, if any, to the individuals medical condition, and in the case of labor to the unborn child, from effecting the transfer      X____________________________________________ DATE 06/21/21        TIME_______      ORIGINAL - SEND TO MEDICAL RECORDS   COPY - SEND WITH PATIENT DURING TRANSFER

## 2021-06-21 ENCOUNTER — HOSPITAL ENCOUNTER (INPATIENT)
Facility: HOSPITAL | Age: 41
LOS: 9 days | Discharge: HOME | DRG: 885 | End: 2021-06-30
Attending: STUDENT IN AN ORGANIZED HEALTH CARE EDUCATION/TRAINING PROGRAM | Admitting: STUDENT IN AN ORGANIZED HEALTH CARE EDUCATION/TRAINING PROGRAM
Payer: COMMERCIAL

## 2021-06-21 VITALS
DIASTOLIC BLOOD PRESSURE: 93 MMHG | RESPIRATION RATE: 20 BRPM | HEART RATE: 95 BPM | OXYGEN SATURATION: 99 % | TEMPERATURE: 98.1 F | WEIGHT: 206.2 LBS | SYSTOLIC BLOOD PRESSURE: 143 MMHG

## 2021-06-21 DIAGNOSIS — F32.3 SEVERE MAJOR DEPRESSION WITH PSYCHOTIC FEATURES (HCC): Primary | ICD-10-CM

## 2021-06-21 DIAGNOSIS — E56.9 VITAMIN DEFICIENCY: ICD-10-CM

## 2021-06-21 DIAGNOSIS — E78.5 HLD (HYPERLIPIDEMIA): ICD-10-CM

## 2021-06-21 DIAGNOSIS — R45.1 RESTLESSNESS: ICD-10-CM

## 2021-06-21 RX ORDER — BENZTROPINE MESYLATE 0.5 MG/1
1 TABLET ORAL
Status: CANCELLED | OUTPATIENT
Start: 2021-06-21

## 2021-06-21 RX ORDER — LANOLIN ALCOHOL/MO/W.PET/CERES
3 CREAM (GRAM) TOPICAL
Status: CANCELLED | OUTPATIENT
Start: 2021-06-21

## 2021-06-21 RX ORDER — ASCORBIC ACID 500 MG
250 TABLET ORAL DAILY
Status: DISCONTINUED | OUTPATIENT
Start: 2021-06-22 | End: 2021-06-30 | Stop reason: HOSPADM

## 2021-06-21 RX ORDER — HYDROXYZINE HYDROCHLORIDE 25 MG/1
25 TABLET, FILM COATED ORAL
Status: CANCELLED | OUTPATIENT
Start: 2021-06-21

## 2021-06-21 RX ORDER — ACETAMINOPHEN 325 MG/1
650 TABLET ORAL EVERY 4 HOURS PRN
Status: DISCONTINUED | OUTPATIENT
Start: 2021-06-21 | End: 2021-06-30 | Stop reason: HOSPADM

## 2021-06-21 RX ORDER — ACETAMINOPHEN 325 MG/1
975 TABLET ORAL EVERY 6 HOURS PRN
Status: DISCONTINUED | OUTPATIENT
Start: 2021-06-21 | End: 2021-06-30 | Stop reason: HOSPADM

## 2021-06-21 RX ORDER — ASPIRIN 81 MG/1
81 TABLET ORAL DAILY
Status: DISCONTINUED | OUTPATIENT
Start: 2021-06-21 | End: 2021-06-21 | Stop reason: HOSPADM

## 2021-06-21 RX ORDER — DIPHENHYDRAMINE HYDROCHLORIDE 50 MG/ML
50 INJECTION INTRAMUSCULAR; INTRAVENOUS EVERY 6 HOURS PRN
Status: CANCELLED | OUTPATIENT
Start: 2021-06-21

## 2021-06-21 RX ORDER — ACETAMINOPHEN 325 MG/1
650 TABLET ORAL EVERY 4 HOURS PRN
Status: CANCELLED | OUTPATIENT
Start: 2021-06-21

## 2021-06-21 RX ORDER — LORAZEPAM 2 MG/ML
2 INJECTION INTRAMUSCULAR EVERY 6 HOURS PRN
Status: DISCONTINUED | OUTPATIENT
Start: 2021-06-21 | End: 2021-06-30 | Stop reason: HOSPADM

## 2021-06-21 RX ORDER — HYDROXYZINE 50 MG/1
100 TABLET, FILM COATED ORAL
Status: DISCONTINUED | OUTPATIENT
Start: 2021-06-21 | End: 2021-06-30 | Stop reason: HOSPADM

## 2021-06-21 RX ORDER — LORAZEPAM 2 MG/ML
2 INJECTION INTRAMUSCULAR EVERY 6 HOURS PRN
Status: CANCELLED | OUTPATIENT
Start: 2021-06-21

## 2021-06-21 RX ORDER — ACETAMINOPHEN 325 MG/1
650 TABLET ORAL EVERY 6 HOURS PRN
Status: DISCONTINUED | OUTPATIENT
Start: 2021-06-21 | End: 2021-06-30 | Stop reason: HOSPADM

## 2021-06-21 RX ORDER — ASPIRIN 81 MG/1
81 TABLET ORAL DAILY
Status: DISCONTINUED | OUTPATIENT
Start: 2021-06-22 | End: 2021-06-30 | Stop reason: HOSPADM

## 2021-06-21 RX ORDER — OLANZAPINE 5 MG/1
5 TABLET ORAL
Status: DISCONTINUED | OUTPATIENT
Start: 2021-06-21 | End: 2021-06-21

## 2021-06-21 RX ORDER — FOLIC ACID 1 MG/1
1 TABLET ORAL DAILY
Status: DISCONTINUED | OUTPATIENT
Start: 2021-06-22 | End: 2021-06-30 | Stop reason: HOSPADM

## 2021-06-21 RX ORDER — DULOXETIN HYDROCHLORIDE 30 MG/1
60 CAPSULE, DELAYED RELEASE ORAL DAILY
Status: DISCONTINUED | OUTPATIENT
Start: 2021-06-22 | End: 2021-06-23

## 2021-06-21 RX ORDER — DULOXETIN HYDROCHLORIDE 60 MG/1
60 CAPSULE, DELAYED RELEASE ORAL DAILY
Status: CANCELLED | OUTPATIENT
Start: 2021-06-22

## 2021-06-21 RX ORDER — ASPIRIN 81 MG/1
81 TABLET ORAL DAILY
Status: CANCELLED | OUTPATIENT
Start: 2021-06-22

## 2021-06-21 RX ORDER — AMOXICILLIN 250 MG
1 CAPSULE ORAL DAILY PRN
Status: DISCONTINUED | OUTPATIENT
Start: 2021-06-21 | End: 2021-06-30 | Stop reason: HOSPADM

## 2021-06-21 RX ORDER — DIPHENHYDRAMINE HYDROCHLORIDE 50 MG/ML
50 INJECTION INTRAMUSCULAR; INTRAVENOUS EVERY 6 HOURS PRN
Status: DISCONTINUED | OUTPATIENT
Start: 2021-06-21 | End: 2021-06-30 | Stop reason: HOSPADM

## 2021-06-21 RX ORDER — OLANZAPINE 5 MG/1
5 TABLET ORAL
Status: CANCELLED | OUTPATIENT
Start: 2021-06-21

## 2021-06-21 RX ORDER — OLANZAPINE 10 MG/1
10 INJECTION, POWDER, LYOPHILIZED, FOR SOLUTION INTRAMUSCULAR
Status: DISCONTINUED | OUTPATIENT
Start: 2021-06-21 | End: 2021-06-22

## 2021-06-21 RX ORDER — ASCORBIC ACID 500 MG
250 TABLET ORAL DAILY
Status: DISCONTINUED | OUTPATIENT
Start: 2021-06-21 | End: 2021-06-21 | Stop reason: HOSPADM

## 2021-06-21 RX ORDER — HYDROXYZINE HYDROCHLORIDE 25 MG/1
25 TABLET, FILM COATED ORAL
Status: DISCONTINUED | OUTPATIENT
Start: 2021-06-21 | End: 2021-06-30 | Stop reason: HOSPADM

## 2021-06-21 RX ORDER — OLANZAPINE 10 MG/1
5 INJECTION, POWDER, LYOPHILIZED, FOR SOLUTION INTRAMUSCULAR
Status: CANCELLED | OUTPATIENT
Start: 2021-06-21

## 2021-06-21 RX ORDER — ACETAMINOPHEN 325 MG/1
650 TABLET ORAL EVERY 6 HOURS PRN
Status: CANCELLED | OUTPATIENT
Start: 2021-06-21

## 2021-06-21 RX ORDER — OLANZAPINE 10 MG/1
2.5 INJECTION, POWDER, LYOPHILIZED, FOR SOLUTION INTRAMUSCULAR
Status: DISCONTINUED | OUTPATIENT
Start: 2021-06-21 | End: 2021-06-22

## 2021-06-21 RX ORDER — HYDROXYZINE 50 MG/1
50 TABLET, FILM COATED ORAL
Status: DISCONTINUED | OUTPATIENT
Start: 2021-06-21 | End: 2021-06-30 | Stop reason: HOSPADM

## 2021-06-21 RX ORDER — FOLIC ACID 1 MG/1
1 TABLET ORAL DAILY
Status: DISCONTINUED | OUTPATIENT
Start: 2021-06-21 | End: 2021-06-21 | Stop reason: HOSPADM

## 2021-06-21 RX ORDER — DULOXETIN HYDROCHLORIDE 60 MG/1
60 CAPSULE, DELAYED RELEASE ORAL DAILY
Status: DISCONTINUED | OUTPATIENT
Start: 2021-06-21 | End: 2021-06-21 | Stop reason: HOSPADM

## 2021-06-21 RX ORDER — MAGNESIUM HYDROXIDE/ALUMINUM HYDROXICE/SIMETHICONE 120; 1200; 1200 MG/30ML; MG/30ML; MG/30ML
30 SUSPENSION ORAL EVERY 4 HOURS PRN
Status: CANCELLED | OUTPATIENT
Start: 2021-06-21

## 2021-06-21 RX ORDER — MAGNESIUM HYDROXIDE/ALUMINUM HYDROXICE/SIMETHICONE 120; 1200; 1200 MG/30ML; MG/30ML; MG/30ML
30 SUSPENSION ORAL EVERY 4 HOURS PRN
Status: DISCONTINUED | OUTPATIENT
Start: 2021-06-21 | End: 2021-06-30 | Stop reason: HOSPADM

## 2021-06-21 RX ORDER — HYDROXYZINE HYDROCHLORIDE 25 MG/1
25 TABLET, FILM COATED ORAL EVERY 6 HOURS PRN
Status: DISCONTINUED | OUTPATIENT
Start: 2021-06-21 | End: 2021-06-21 | Stop reason: HOSPADM

## 2021-06-21 RX ORDER — FOLIC ACID 1 MG/1
1 TABLET ORAL DAILY
Status: CANCELLED | OUTPATIENT
Start: 2021-06-22

## 2021-06-21 RX ORDER — OLANZAPINE 10 MG/1
2.5 INJECTION, POWDER, LYOPHILIZED, FOR SOLUTION INTRAMUSCULAR
Status: CANCELLED | OUTPATIENT
Start: 2021-06-21

## 2021-06-21 RX ORDER — BENZTROPINE MESYLATE 1 MG/1
1 TABLET ORAL
Status: DISCONTINUED | OUTPATIENT
Start: 2021-06-21 | End: 2021-06-30 | Stop reason: HOSPADM

## 2021-06-21 RX ORDER — HYDROXYZINE HYDROCHLORIDE 25 MG/1
50 TABLET, FILM COATED ORAL
Status: CANCELLED | OUTPATIENT
Start: 2021-06-21

## 2021-06-21 RX ORDER — MELATONIN
1000 DAILY
Status: DISCONTINUED | OUTPATIENT
Start: 2021-06-21 | End: 2021-06-21 | Stop reason: HOSPADM

## 2021-06-21 RX ORDER — OLANZAPINE 10 MG/1
5 INJECTION, POWDER, LYOPHILIZED, FOR SOLUTION INTRAMUSCULAR
Status: DISCONTINUED | OUTPATIENT
Start: 2021-06-21 | End: 2021-06-21

## 2021-06-21 RX ORDER — OLANZAPINE 5 MG/1
2.5 TABLET ORAL
Status: CANCELLED | OUTPATIENT
Start: 2021-06-21

## 2021-06-21 RX ORDER — LANOLIN ALCOHOL/MO/W.PET/CERES
3 CREAM (GRAM) TOPICAL
Status: DISCONTINUED | OUTPATIENT
Start: 2021-06-21 | End: 2021-06-30 | Stop reason: HOSPADM

## 2021-06-21 RX ORDER — OLANZAPINE 10 MG/1
10 TABLET ORAL
Status: DISCONTINUED | OUTPATIENT
Start: 2021-06-21 | End: 2021-06-22

## 2021-06-21 RX ORDER — AMOXICILLIN 250 MG
1 CAPSULE ORAL DAILY PRN
Status: CANCELLED | OUTPATIENT
Start: 2021-06-21

## 2021-06-21 RX ORDER — OLANZAPINE 2.5 MG/1
2.5 TABLET ORAL
Status: DISCONTINUED | OUTPATIENT
Start: 2021-06-21 | End: 2021-06-22

## 2021-06-21 RX ORDER — ASCORBIC ACID 500 MG
250 TABLET ORAL DAILY
Status: CANCELLED | OUTPATIENT
Start: 2021-06-22

## 2021-06-21 RX ORDER — ACETAMINOPHEN 325 MG/1
975 TABLET ORAL EVERY 6 HOURS PRN
Status: CANCELLED | OUTPATIENT
Start: 2021-06-21

## 2021-06-21 RX ORDER — ACETAMINOPHEN 325 MG/1
975 TABLET ORAL ONCE
Status: COMPLETED | OUTPATIENT
Start: 2021-06-21 | End: 2021-06-21

## 2021-06-21 RX ORDER — OLANZAPINE 5 MG/1
5 TABLET ORAL
Status: DISCONTINUED | OUTPATIENT
Start: 2021-06-21 | End: 2021-06-22

## 2021-06-21 RX ORDER — HYDROXYZINE HYDROCHLORIDE 25 MG/1
100 TABLET, FILM COATED ORAL
Status: CANCELLED | OUTPATIENT
Start: 2021-06-21

## 2021-06-21 RX ADMIN — ACETAMINOPHEN 975 MG: 325 TABLET ORAL at 15:42

## 2021-06-21 RX ADMIN — OXYCODONE HYDROCHLORIDE AND ACETAMINOPHEN 250 MG: 500 TABLET ORAL at 08:43

## 2021-06-21 RX ADMIN — HYDROXYZINE HYDROCHLORIDE 25 MG: 25 TABLET, FILM COATED ORAL at 08:43

## 2021-06-21 RX ADMIN — ASPIRIN 81 MG: 81 TABLET, COATED ORAL at 08:43

## 2021-06-21 RX ADMIN — MELATONIN 3 MG: at 21:37

## 2021-06-21 RX ADMIN — Medication 1000 UNITS: at 08:43

## 2021-06-21 RX ADMIN — DULOXETINE 60 MG: 60 CAPSULE, DELAYED RELEASE ORAL at 08:43

## 2021-06-21 RX ADMIN — FOLIC ACID 1 MG: 1 TABLET ORAL at 08:43

## 2021-06-21 NOTE — ED NOTES
Patient is accepted at [address]   Patient is accepted by [facility]    Transportation is arranged with [facility]  Transportation is scheduled for 1700      Nurse report is to be called to [phone] prior to patient transfer

## 2021-06-21 NOTE — ED NOTES
Pt sleeping, respirations are even and unlabored  No signs of distress or discomfort   Will continue to monitor       Ulisses Fofana RN  06/21/21 6589

## 2021-06-21 NOTE — ED NOTES
Pt awake, no complaints, cooperative  Pt would like to feel busy  Activities book given       Herb Ford RN  06/21/21 9162

## 2021-06-21 NOTE — NURSING NOTE
Pt admitted as a 201 Disorganized, delusional and paranoid  H/o concussion x4 and htn  Pt is a poor historian and continues to be paranoid upon admission to the unit  Pt is not sure why he is here and verbalized to this writer that he thought he would be going home with his wife  Pt denies SI/HI/AVH and denies smoking and endorses having one drink approximately 2wks ago  Pt denies substance abuse and any h/o suicidal ideation  Pt reports to this writer that he "can't remember" any past or present abuse  Pt denies having any questions or concerns at the present

## 2021-06-21 NOTE — ED NOTES
Insurance Authorization for admission:   Phone call placed to [employer] number: [phone]  Spoke to Sindhu Patel     4 days approved  Level of care: inpatient mental health  Review on 6/24 Thurs  Authorization # [id number]    Ongoing reviewer is Esequiel Jones at [phone]      EVS (Eligibility Verification System) called - [phone]    Automated system indicates: [address] [place] [address]  '  COB called to [address] [place] [address] at [address] [place] [address] to Felicity

## 2021-06-21 NOTE — ED NOTES
Pt sleeping in bed   Respirations even and unlabored       Mariano Lao, [facility]  06/20/21 6656

## 2021-06-21 NOTE — ED NOTES
Pt pacing around the room, I gave him water and a small container on pudding        Emmanuel Monzon RN  06/21/21 9357

## 2021-06-21 NOTE — ED CARE HANDOFF
Emergency Department Sign Out Note        Sign out and transfer of care from Dr Vivienne Iglesias  See Separate Emergency Department note  The patient, Latoya Ogden, was evaluated by the previous provider for Psych  Workup Completed:  Medical Clearance    ED Course / Workup Pending (followup): No acute events  Awaiting bed assignment  Continue to monitor for safety  Procedures  MDM    Disposition  Final diagnoses:   Encounter for psychological evaluation   Paranoia ([facility] [address] )   Delusions ([facility] [address] )     Time reflects when diagnosis was documented in both MDM as applicable and the Disposition within this note     Time User Action Codes Description Comment    6/20/2021  2:35 PM Katelin Bones Add [Z00 8] Encounter for psychological evaluation     6/20/2021  2:36 PM Katelin Bones Add [F22] Paranoia ([facility] [address] )     6/20/2021  2:36 PM Alfredo Ayon Add [F22] Delusions [facility])       ED Disposition     ED Disposition Condition Date/Time Comment    Transfer to [facility] Jun 20, 2021  2:36 PM Latoya Ogden should be transferred out to inpatient psychiatry and has been medically cleared  Follow-up Information    None       Patient's Medications   Discharge Prescriptions    No medications on file     No discharge procedures on file         ED Provider  Electronically Signed by     Sirisha Remy DO  06/20/21 4572

## 2021-06-21 NOTE — ED NOTES
Pt sleeping in bed   Respirations even and unlabored       Skye Callaway, [facility]  06/21/21 5746

## 2021-06-21 NOTE — PROGRESS NOTES
Patient arrived on the unit with the following items:    Crossword puzzle book  Collared shirt  Lucía Mullins  Ring

## 2021-06-21 NOTE — ED NOTES
Insurance    Primary insurance is [facility] at [phone]        EVS (Eligibility Verification System) called - [phone]    Automated system indicates:active with [facility]

## 2021-06-21 NOTE — ED NOTES
EVS (Eligibility Verification System) called - [phone]  Automated system indicates: Patient is eligible, recipient number [id number], with [facility]

## 2021-06-22 ENCOUNTER — APPOINTMENT (EMERGENCY)
Dept: RADIOLOGY | Facility: HOSPITAL | Age: 41
End: 2021-06-22
Payer: COMMERCIAL

## 2021-06-22 ENCOUNTER — APPOINTMENT (EMERGENCY)
Dept: CT IMAGING | Facility: HOSPITAL | Age: 41
End: 2021-06-22
Payer: COMMERCIAL

## 2021-06-22 ENCOUNTER — HOSPITAL ENCOUNTER (EMERGENCY)
Facility: HOSPITAL | Age: 41
End: 2021-06-22
Attending: EMERGENCY MEDICINE | Admitting: EMERGENCY MEDICINE
Payer: COMMERCIAL

## 2021-06-22 VITALS
RESPIRATION RATE: 18 BRPM | HEART RATE: 95 BPM | OXYGEN SATURATION: 100 % | DIASTOLIC BLOOD PRESSURE: 86 MMHG | WEIGHT: 205 LBS | BODY MASS INDEX: 30.36 KG/M2 | HEIGHT: 69 IN | SYSTOLIC BLOOD PRESSURE: 142 MMHG | TEMPERATURE: 97 F

## 2021-06-22 DIAGNOSIS — R55 SYNCOPE AND COLLAPSE: Primary | ICD-10-CM

## 2021-06-22 DIAGNOSIS — S09.90XA MINOR HEAD INJURY, INITIAL ENCOUNTER: ICD-10-CM

## 2021-06-22 DIAGNOSIS — F22 PARANOIA (HCC): ICD-10-CM

## 2021-06-22 PROBLEM — I10 BENIGN ESSENTIAL HTN: Status: ACTIVE | Noted: 2021-06-22

## 2021-06-22 PROBLEM — F39 MOOD DISORDER (HCC): Status: ACTIVE | Noted: 2021-06-22

## 2021-06-22 PROBLEM — Z00.8 MEDICAL CLEARANCE FOR PSYCHIATRIC ADMISSION: Status: ACTIVE | Noted: 2021-06-22

## 2021-06-22 LAB
ALBUMIN SERPL BCP-MCNC: 4.2 G/DL (ref 3.5–5)
ALP SERPL-CCNC: 88 U/L (ref 46–116)
ALT SERPL W P-5'-P-CCNC: 25 U/L (ref 12–78)
ANION GAP SERPL CALCULATED.3IONS-SCNC: 8 MMOL/L (ref 4–13)
AST SERPL W P-5'-P-CCNC: 16 U/L (ref 5–45)
BASOPHILS # BLD AUTO: 0.03 THOUSANDS/ΜL (ref 0–0.1)
BASOPHILS NFR BLD AUTO: 0 % (ref 0–1)
BILIRUB SERPL-MCNC: 0.7 MG/DL (ref 0.2–1)
BUN SERPL-MCNC: 9 MG/DL (ref 5–25)
CALCIUM SERPL-MCNC: 9.5 MG/DL (ref 8.3–10.1)
CHLORIDE SERPL-SCNC: 104 MMOL/L (ref 100–108)
CO2 SERPL-SCNC: 28 MMOL/L (ref 21–32)
CREAT SERPL-MCNC: 0.99 MG/DL (ref 0.6–1.3)
D DIMER PPP FEU-MCNC: 0.46 UG/ML FEU
EOSINOPHIL # BLD AUTO: 0.08 THOUSAND/ΜL (ref 0–0.61)
EOSINOPHIL NFR BLD AUTO: 1 % (ref 0–6)
ERYTHROCYTE [DISTWIDTH] IN BLOOD BY AUTOMATED COUNT: 12.6 % (ref 11.6–15.1)
GFR SERPL CREATININE-BSD FRML MDRD: 95 ML/MIN/1.73SQ M
GLUCOSE SERPL-MCNC: 113 MG/DL (ref 65–140)
GLUCOSE SERPL-MCNC: 118 MG/DL (ref 65–140)
HCT VFR BLD AUTO: 46.1 % (ref 36.5–49.3)
HGB BLD-MCNC: 15.5 G/DL (ref 12–17)
IMM GRANULOCYTES # BLD AUTO: 0.03 THOUSAND/UL (ref 0–0.2)
IMM GRANULOCYTES NFR BLD AUTO: 0 % (ref 0–2)
LYMPHOCYTES # BLD AUTO: 1.9 THOUSANDS/ΜL (ref 0.6–4.47)
LYMPHOCYTES NFR BLD AUTO: 21 % (ref 14–44)
MCH RBC QN AUTO: 30.7 PG (ref 26.8–34.3)
MCHC RBC AUTO-ENTMCNC: 33.6 G/DL (ref 31.4–37.4)
MCV RBC AUTO: 91 FL (ref 82–98)
MONOCYTES # BLD AUTO: 0.84 THOUSAND/ΜL (ref 0.17–1.22)
MONOCYTES NFR BLD AUTO: 9 % (ref 4–12)
NEUTROPHILS # BLD AUTO: 6.28 THOUSANDS/ΜL (ref 1.85–7.62)
NEUTS SEG NFR BLD AUTO: 69 % (ref 43–75)
NRBC BLD AUTO-RTO: 0 /100 WBCS
PLATELET # BLD AUTO: 282 THOUSANDS/UL (ref 149–390)
PMV BLD AUTO: 9.3 FL (ref 8.9–12.7)
POTASSIUM SERPL-SCNC: 4 MMOL/L (ref 3.5–5.3)
PROT SERPL-MCNC: 8 G/DL (ref 6.4–8.2)
RBC # BLD AUTO: 5.05 MILLION/UL (ref 3.88–5.62)
SODIUM SERPL-SCNC: 140 MMOL/L (ref 136–145)
TROPONIN I SERPL-MCNC: <0.02 NG/ML
WBC # BLD AUTO: 9.16 THOUSAND/UL (ref 4.31–10.16)

## 2021-06-22 PROCEDURE — 99222 1ST HOSP IP/OBS MODERATE 55: CPT | Performed by: STUDENT IN AN ORGANIZED HEALTH CARE EDUCATION/TRAINING PROGRAM

## 2021-06-22 PROCEDURE — 36415 COLL VENOUS BLD VENIPUNCTURE: CPT | Performed by: EMERGENCY MEDICINE

## 2021-06-22 PROCEDURE — 99284 EMERGENCY DEPT VISIT MOD MDM: CPT | Performed by: EMERGENCY MEDICINE

## 2021-06-22 PROCEDURE — 71045 X-RAY EXAM CHEST 1 VIEW: CPT

## 2021-06-22 PROCEDURE — G1004 CDSM NDSC: HCPCS

## 2021-06-22 PROCEDURE — 96366 THER/PROPH/DIAG IV INF ADDON: CPT

## 2021-06-22 PROCEDURE — 70450 CT HEAD/BRAIN W/O DYE: CPT

## 2021-06-22 PROCEDURE — 99285 EMERGENCY DEPT VISIT HI MDM: CPT

## 2021-06-22 PROCEDURE — 85025 COMPLETE CBC W/AUTO DIFF WBC: CPT | Performed by: EMERGENCY MEDICINE

## 2021-06-22 PROCEDURE — 84484 ASSAY OF TROPONIN QUANT: CPT | Performed by: EMERGENCY MEDICINE

## 2021-06-22 PROCEDURE — 96365 THER/PROPH/DIAG IV INF INIT: CPT

## 2021-06-22 PROCEDURE — 99222 1ST HOSP IP/OBS MODERATE 55: CPT | Performed by: PHYSICIAN ASSISTANT

## 2021-06-22 PROCEDURE — 72125 CT NECK SPINE W/O DYE: CPT

## 2021-06-22 PROCEDURE — 93005 ELECTROCARDIOGRAM TRACING: CPT

## 2021-06-22 PROCEDURE — 80053 COMPREHEN METABOLIC PANEL: CPT | Performed by: EMERGENCY MEDICINE

## 2021-06-22 PROCEDURE — 85379 FIBRIN DEGRADATION QUANT: CPT | Performed by: EMERGENCY MEDICINE

## 2021-06-22 PROCEDURE — 82948 REAGENT STRIP/BLOOD GLUCOSE: CPT

## 2021-06-22 RX ORDER — DIPHENHYDRAMINE HCL 25 MG
25 TABLET ORAL EVERY 4 HOURS PRN
Status: DISCONTINUED | OUTPATIENT
Start: 2021-06-22 | End: 2021-06-30 | Stop reason: HOSPADM

## 2021-06-22 RX ORDER — CHLORPROMAZINE HYDROCHLORIDE 25 MG/1
50 TABLET, FILM COATED ORAL EVERY 6 HOURS PRN
Status: DISCONTINUED | OUTPATIENT
Start: 2021-06-22 | End: 2021-06-30 | Stop reason: HOSPADM

## 2021-06-22 RX ORDER — HALOPERIDOL 10 MG/1
10 TABLET ORAL EVERY 8 HOURS PRN
Status: DISCONTINUED | OUTPATIENT
Start: 2021-06-22 | End: 2021-06-30 | Stop reason: HOSPADM

## 2021-06-22 RX ORDER — SODIUM CHLORIDE, SODIUM GLUCONATE, SODIUM ACETATE, POTASSIUM CHLORIDE, MAGNESIUM CHLORIDE, SODIUM PHOSPHATE, DIBASIC, AND POTASSIUM PHOSPHATE .53; .5; .37; .037; .03; .012; .00082 G/100ML; G/100ML; G/100ML; G/100ML; G/100ML; G/100ML; G/100ML
1000 INJECTION, SOLUTION INTRAVENOUS ONCE
Status: COMPLETED | OUTPATIENT
Start: 2021-06-22 | End: 2021-06-22

## 2021-06-22 RX ORDER — LORAZEPAM 1 MG/1
1 TABLET ORAL EVERY 6 HOURS PRN
Status: DISCONTINUED | OUTPATIENT
Start: 2021-06-22 | End: 2021-06-30 | Stop reason: HOSPADM

## 2021-06-22 RX ORDER — HALOPERIDOL 5 MG
5 TABLET ORAL EVERY 6 HOURS PRN
Status: DISCONTINUED | OUTPATIENT
Start: 2021-06-22 | End: 2021-06-30 | Stop reason: HOSPADM

## 2021-06-22 RX ORDER — HALOPERIDOL 5 MG/ML
10 INJECTION INTRAMUSCULAR EVERY 8 HOURS PRN
Status: DISCONTINUED | OUTPATIENT
Start: 2021-06-22 | End: 2021-06-30 | Stop reason: HOSPADM

## 2021-06-22 RX ORDER — HALOPERIDOL 2 MG/1
2 TABLET ORAL EVERY 6 HOURS PRN
Status: DISCONTINUED | OUTPATIENT
Start: 2021-06-22 | End: 2021-06-30 | Stop reason: HOSPADM

## 2021-06-22 RX ORDER — DIPHENHYDRAMINE HCL 25 MG
50 TABLET ORAL ONCE
Status: COMPLETED | OUTPATIENT
Start: 2021-06-22 | End: 2021-06-22

## 2021-06-22 RX ADMIN — ASPIRIN 81 MG: 81 TABLET, COATED ORAL at 08:20

## 2021-06-22 RX ADMIN — BENZTROPINE MESYLATE 1 MG: 1 TABLET ORAL at 16:54

## 2021-06-22 RX ADMIN — ACETAMINOPHEN 975 MG: 325 TABLET, FILM COATED ORAL at 16:32

## 2021-06-22 RX ADMIN — DIPHENHYDRAMINE HYDROCHLORIDE 25 MG: 25 TABLET ORAL at 19:31

## 2021-06-22 RX ADMIN — MELATONIN 3 MG: at 21:11

## 2021-06-22 RX ADMIN — FOLIC ACID 1 MG: 1 TABLET ORAL at 08:20

## 2021-06-22 RX ADMIN — CHLORPROMAZINE HYDROCHLORIDE 50 MG: 25 TABLET, SUGAR COATED ORAL at 09:36

## 2021-06-22 RX ADMIN — HALOPERIDOL 10 MG: 10 TABLET ORAL at 16:29

## 2021-06-22 RX ADMIN — OLANZAPINE 5 MG: 5 TABLET, FILM COATED ORAL at 08:21

## 2021-06-22 RX ADMIN — HYDROXYZINE HYDROCHLORIDE 25 MG: 25 TABLET, FILM COATED ORAL at 01:04

## 2021-06-22 RX ADMIN — ACETAMINOPHEN 975 MG: 325 TABLET, FILM COATED ORAL at 09:13

## 2021-06-22 RX ADMIN — OXYCODONE HYDROCHLORIDE AND ACETAMINOPHEN 250 MG: 500 TABLET ORAL at 08:19

## 2021-06-22 RX ADMIN — DIPHENHYDRAMINE HYDROCHLORIDE 50 MG: 25 TABLET ORAL at 09:37

## 2021-06-22 RX ADMIN — SODIUM CHLORIDE, SODIUM GLUCONATE, SODIUM ACETATE, POTASSIUM CHLORIDE, MAGNESIUM CHLORIDE, SODIUM PHOSPHATE, DIBASIC, AND POTASSIUM PHOSPHATE 1000 ML: .53; .5; .37; .037; .03; .012; .00082 INJECTION, SOLUTION INTRAVENOUS at 12:11

## 2021-06-22 NOTE — TRAUMA DOCUMENTATION
Patient found out of bed walking around in room  Announcement for fire drill increased patients paranoia, leading patient to believe that we were "leaving him to die " patient reassured that he is safe  c-collar removed by physician  Tech ambulated patient to restroom

## 2021-06-22 NOTE — NURSING NOTE
Update to note from 6/22/2021 1123 regarding patient prior to fall:  Patient was observed grabbing on to the wall outside of nurses station and fell to the ground, patient hit head on floor  VS obtained  Arousable to verbal command  Oriented to person and time  MD aware  EMS called  Patient remained on floor until arrival of EMS
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
## 2021-06-22 NOTE — DISCHARGE INSTR - OTHER ORDERS
You will be discharged to your home at [address] [facility], [address] - Your wife will pick you up  Lorri Lopez, [address] [facility] [phone]    Text CONNECT to [phone] from anywhere in the [place], anytime, about any type of crisis  A live, trained Crisis Counselor receives the text and lets you know that they are here to listen  The volunteer Crisis Counselor will help you move from a hot moment to a cool moment  Warm Line: [phone], [phone], [phone] If it is not quite a crisis, but you want to talk to someone, 24 hours/day, 7 days/week: Someone to listen; someone who cares  Luis Carlos 11   Address: [address] [place] [address],[address] [address] [address], [facility]FFIL[facility]ATED WITH [facility], [address] Phone: [phone]    The [address] [facility]  on [address] [facility]) offers various education & support groups for you & your family  For more information visit their website at http://Method/     Dial 2-1-1 to get connected/get help  Free, confidential information & referral available 24/7: Aging Services, Child & Youth Services, Counseling, Education/Training, Food/Shelter/Clothing, Health Services, Parenting, Substance Abuse, Support Groups, Volunteer Opportunities, & much more  Phone: 2-1-1 or 449-975-8584, Web: NARCISO CE178ASYS YFJASMIN, Email: Zoraida@Recovers    The National Suicide Prevention Lifeline, 1-296-653-TALK (3831), is a federally funded, 24-hour, toll-free suicide prevention service comprised of more than 120 individual crisis centers across the country  This service is available to anyone in suicidal crisis, emotional distress or those concerned about a friend or loved one  Https://suicidepreventionlifeline org/    Find more resources at [employer]      DRUG AND ALCOHOL RESOURCES IN [place] [address]    If you have health insurance, including medical assistance, there should be a phone number on your insurance card that you can call to find out how to access services  The card may say, For [address] or For Drug and Alcohol Services or For Substance Abuse Services call the number provided  Or call [place] Get Help Now at [address] [facility]  For information on how to access Drug and Alcohol treatment services please contact:  After hours 24/7 number: [place] [facility] at [phone]  During regular business hours call:   [address] Free: [phone] [address] [place]: [phone]  [facility]: [phone]  [facility] 05 [phone]  Confidential free help, from public health agencies, to find substance use treatment and information  [phone]    12 step Meetings    [place] [address] [phone]  [facility] hotline: 821.927.7735  AA meeting list can be found at [email or web address] [email or web address]/  NA meeting list can be found at [employer] si  org/current-meeting-list/current  pdf             Ced said, "You may not control all the events that happen to you, but you can decide not to be reduced by them "   We are currently living in quite unpredictable times, where we may be feeling completely out of control  At [facility], we understand the distress which results in feeling out of control, which is why we remained open during the pandemic  [facility] continues to provide partial hospitalization services in a telehealth setting amidst COVID-19 to ensure the health and emotional safety of our clients  We have adapted our ability to provide care to a variety of clients by offering our partial program services via the [facility] care is being provided at [address]     Time  Group    9:00 to 9:30  Morning Assessment    9:30 to 10:15  Psychotherapy Group    10:15 to 10:25  Break    10:25 to 11:10  Education Group    11:10 to 11:30  Lunch    11:30 to 12:15  Allied Therapy    12:15 to 12:30  Wrap Up and Goal Setting    12:30 to ?? Case Management Sessions with CM        Eligible clients need access to internet and hardware to participate in the program  They need the independent and technological ability to access the groups and participate in a virtual setting  They need to be able to set aside the interrupted time to participate in the program from 9:00am - 12:30pm      Clients are offered three groups each day and they are offered up to three individual Case Management sessions via phone or Teams to work on skill building, aftercare planning, and connection to services  Clients are encouraged to inform their outpatient team that they will be attending [facility] and their  can coordinate a return to treatment with them upon discharge  Downloading and Instruction Guide    1  Go into your device's Jeronimo Store  Download [employer] teams  2  DO NOT create a user account  Once the jeronimo is downloaded, close it out and wait for your scheduled appointment  3  For an [facility] group visit:  a  Look for an email from Magnus Vincent, our clinical , with a similar title that reads [facility] Program'  b  Open the email  c  Click the link VCV   d  Your jeronimo will automatically open  When you are prompted, choose JOIN AS A GUEST and type your name  e  Rodrick Vincent NOW (again, I know it is repetitive)  f  Make sure you turn on your camera and microphone  Hang Up to leave meeting  Camera  Microphone      4  For a medication check:  a  Follow the instructions above to enter the [facility] meeting  b  HANG UP (Red Phone Icon)  from the meeting to leave for your medication check  c  Look for an email from Magnus Vincent with a similar title that reads Medication Check with Dr Maria Alejandra callahan  Open the email  e   Click the link Join [employer] Teams Meeting  f  Your veronica will automatically open  When you are prompted, JOIN AS A GUEST and type your name  g  Click JOIN NOW  h  Turn on your camera and microphone   i  Upon completion of your medication check, return to the email you received regarding today's dated PHP program    j  Again, click the link [employer]  k  Your veronica will automatically open  When you are prompted, JOIN AS A GUEST and type your name  l  Click JOIN NOW  m   Turn on your camera and microphone

## 2021-06-22 NOTE — NURSING NOTE
Pt up several times throughout night  Pt reported the sound machine and ear plugs are not drowning out roommate's snoring  Pt requested Kory puzzle  Pt utilized quiet room to work on puzzle  Pt resting in quiet room presently

## 2021-06-22 NOTE — ED PROVIDER NOTES
Emergency Department Trauma Note  Deysi Walter 36 y o  male MRN: [id number]  Unit/Bed#: ED 07/ED 07 Encounter: [id number]      Trauma Alert: Trauma Acuity: Trauma Evaluation  Model of Arrival: Mode of Arrival: ALS via Trauma Squad Name and Number: DIONTE   Trauma Team: Current Providers  Attending Provider: Alisia Cee DO  Registered Nurse: Tomi Miller RN  Registered Nurse: Kathrine Torres RN  Consultants: None      History of Present Illness     Chief Complaint:   Chief Complaint   Patient presents with    Syncope     patient currently inpt tx at 2W  apparently woke with mortveronique paranoia thsi morning  patient was medicated with zyprexa and benadryl  after administration, patient was at nurses station and had a syncopal episodes with intermittent consciousness  +head strike no blood thinners     HPI:  Deysi Walter is a 36 y o  male with a past medical history significant for current inpatient psychiatric admission for paranoid psychosis, h/o HTN, who is brought in by ambulance from [address] after syncope and collapse  Per report, patient was increasingly agitated and paranoid this morning requiring medication administration  He then came out to the nurses station and had a syncopal episode resulting in fall with head strike  Patient reports that he was trying really hard to have a bowel movement in the bathroom because he feels very constipated, he then walked out and felt very lightheaded, became clammy, and then doesn't remember what happened until he woke up and the staff was "yelling" at him  He reports that the back of his head hurts a little bit  Denies back pain, loss of bowel or bladder control, chest pain, palpitations, shortness of breath, dizziness, numbness, tingling, weakness  Otherwise, denies any complaints  Mechanism:Details of Incident: syncopal episode with head strike          HPI  Review of Systems   Constitutional: Negative for chills and fever     HENT: Negative for congestion and rhinorrhea  Eyes: Negative for photophobia and visual disturbance  Respiratory: Negative for cough and shortness of breath  Cardiovascular: Negative for chest pain and palpitations  Gastrointestinal: Negative for abdominal pain, constipation, diarrhea, nausea and vomiting  Genitourinary: Negative for dysuria, flank pain and hematuria  Musculoskeletal: Negative for back pain and neck pain  Skin: Negative for pallor and wound  Neurological: Positive for syncope and headaches  Negative for dizziness, weakness, light-headedness and numbness  Historical Information     Immunizations: There is no immunization history on file for this patient  Past Medical History:   Diagnosis Date    Anxiety     Hypertension      History reviewed  No pertinent family history  Past Surgical History:   Procedure Laterality Date    CORNEAL TRANSPLANT      EYE SURGERY      TONSILLECTOMY       Social History     Tobacco Use    Smoking status: Former Smoker    Smokeless tobacco: Never Used   Vaping Use    Vaping Use: Never used   Substance Use Topics    Alcohol use: Not Currently    Drug use: Never     E-Cigarette/Vaping    E-Cigarette Use Never User      E-Cigarette/Vaping Substances    Nicotine No     THC No     CBD No     Flavoring No     Other No     Unknown No        Family History: non-contributory    Meds/Allergies   Prior to Admission Medications   Prescriptions Last Dose Informant Patient Reported? Taking?    Ascorbic Acid, Vitamin C, (VITAMIN C) 100 MG tablet   Yes No   Sig: Take 100 mg by mouth daily   DULoxetine (CYMBALTA) 60 mg delayed release capsule   Yes No   Sig: Take 60 mg by mouth daily   Folic Acid-Cholecalciferol 1-5000 MG-UNIT TABS   Yes No   Sig: Take by mouth daily   acetaminophen (TYLENOL) 500 mg tablet   Yes No   Sig: Take 500 mg by mouth every 6 (six) hours as needed   Patient not taking: Reported on 6/21/2021   aspirin 81 mg chewable tablet   Yes No   Sig: Chew 81 mg daily   atorvastatin (LIPITOR) 40 mg tablet   Yes No   Sig: Take 40 mg by mouth daily   hydrOXYzine HCL (ATARAX) 25 mg tablet   Yes No   Sig: Take 25 mg by mouth every 6 (six) hours as needed   polyethylene glycol (GLYCOLAX) 17 GM/SCOOP powder   Yes No   Sig: Take 17 g by mouth daily      Facility-Administered Medications: None       Allergies   Allergen Reactions    Adhesive [Medical Tape] Rash       PHYSICAL EXAM    PE limited by: n/a    Objective   Vitals:   First set: Temperature: (!) 97 °F (36 1 °C) (06/22/21 1150)  Pulse: (!) 115 (06/22/21 1150)  Respirations: 18 (06/22/21 1150)  Blood Pressure: (!) 155/102 (06/22/21 1151)  SpO2: 100 % (06/22/21 1150)    Primary Survey:   (A) Airway: intact  (B) Breathing: ctabl  (C) Circulation: Pulses:   normal  (D) Disabliity:  GCS Total:  15  (E) Expose:  Completed    Secondary Survey: (Click on Physical Exam tab above)  Physical Exam  Vitals and nursing note reviewed  Constitutional:       General: He is not in acute distress  Appearance: Normal appearance  He is not ill-appearing, toxic-appearing or diaphoretic  HENT:      Head: Normocephalic and atraumatic  No raccoon eyes, Montero's sign, abrasion, contusion or laceration  Right Ear: No hemotympanum  Left Ear: No hemotympanum  Nose:      Right Nostril: No epistaxis or septal hematoma  Left Nostril: No epistaxis or septal hematoma  Mouth/Throat:      Mouth: Mucous membranes are moist    Eyes:      Extraocular Movements: Extraocular movements intact  Conjunctiva/sclera: Conjunctivae normal       Pupils: Pupils are equal, round, and reactive to light  Cardiovascular:      Rate and Rhythm: Normal rate and regular rhythm  Pulses: Normal pulses  Heart sounds: Normal heart sounds  No murmur heard  Pulmonary:      Effort: Pulmonary effort is normal  No respiratory distress  Breath sounds: Normal breath sounds  No stridor  No wheezing, rhonchi or rales  Chest:      Chest wall: No tenderness  Abdominal:      General: Bowel sounds are normal  There is no distension  Palpations: Abdomen is soft  Tenderness: There is no abdominal tenderness  There is no guarding or rebound  Musculoskeletal:      Cervical back: Neck supple  Tenderness present  No swelling, edema, deformity, erythema, lacerations, rigidity, spasms, torticollis, bony tenderness or crepitus  No pain with movement  Normal range of motion  Back:       Right lower leg: No edema  Left lower leg: No edema  Comments: No midline c-t-l-spine tenderness, step offs, deformities   Pelvis stable    Skin:     General: Skin is warm and dry  Neurological:      General: No focal deficit present  Mental Status: He is alert and oriented to person, place, and time  Mental status is at baseline  Psychiatric:         Mood and Affect: Mood normal  Affect is labile  Speech: Speech normal          Behavior: Behavior is agitated  Thought Content: Thought content is paranoid  Cervical spine cleared by clinical criteria?  No (imaging required)      Invasive Devices     None                 Lab Results:   Results Reviewed     Procedure Component Value Units Date/Time    D-Dimer [[id number]]  (Normal) Collected: 06/22/21 1157    Lab Status: Final result Specimen: Blood from Arm, Left Updated: 06/22/21 1232     D-Dimer, Quant 0 46 ug/ml FEU     Troponin I [[id number]]  (Normal) Collected: 06/22/21 1157    Lab Status: Final result Specimen: Blood from Arm, Left Updated: 06/22/21 1223     Troponin I <0 02 ng/mL     Comprehensive metabolic panel [[id number]] Collected: 06/22/21 1157    Lab Status: Final result Specimen: Blood from Arm, Left Updated: 06/22/21 1219     Sodium 140 mmol/L      Potassium 4 0 mmol/L      Chloride 104 mmol/L      CO2 28 mmol/L      ANION GAP 8 mmol/L      BUN 9 mg/dL      Creatinine 0 99 mg/dL      Glucose 118 mg/dL      Calcium 9 5 mg/dL      AST 16 U/L      ALT 25 U/L      Alkaline Phosphatase 88 U/L      Total Protein 8 0 g/dL      Albumin 4 2 g/dL      Total Bilirubin 0 70 mg/dL      eGFR 95 ml/min/1 73sq m     Narrative:      [place] guidelines for Chronic Kidney Disease (CKD):     Stage 1 with normal or high GFR (GFR > 90 mL/min/1 73 square meters)    Stage 2 Mild CKD (GFR = 60-89 mL/min/1 73 square meters)    Stage 3A Moderate CKD (GFR = 45-59 mL/min/1 73 square meters)    Stage 3B Moderate CKD (GFR = 30-44 mL/min/1 73 square meters)    Stage 4 Severe CKD (GFR = 15-29 mL/min/1 73 square meters)    Stage 5 End Stage CKD (GFR <15 mL/min/1 73 square meters)  Note: GFR calculation is accurate only with a steady state creatinine    CBC and differential [[id number]] Collected: 06/22/21 1157    Lab Status: Final result Specimen: Blood from Arm, Left Updated: 06/22/21 1204     WBC 9 16 Thousand/uL      RBC 5 05 Million/uL      Hemoglobin 15 5 g/dL      Hematocrit 46 1 %      MCV 91 fL      MCH 30 7 pg      MCHC 33 6 g/dL      RDW 12 6 %      MPV 9 3 fL      Platelets 564 Thousands/uL      nRBC 0 /100 WBCs      Neutrophils Relative 69 %      Immat GRANS % 0 %      Lymphocytes Relative 21 %      Monocytes Relative 9 %      Eosinophils Relative 1 %      Basophils Relative 0 %      Neutrophils Absolute 6 28 Thousands/µL      Immature Grans Absolute 0 03 Thousand/uL      Lymphocytes Absolute 1 90 Thousands/µL      Monocytes Absolute 0 84 Thousand/µL      Eosinophils Absolute 0 08 Thousand/µL      Basophils Absolute 0 03 Thousands/µL                  Imaging Studies:   Direct to CT: Yes  CT head without contrast   Final Result by Hussein Ugalde MD (06/22 1232)      No acute intracranial abnormality  Workstation performed: [id number]         CT spine cervical without contrast   Final Result by Hussein Ugalde MD (06/22 1223)      No cervical spine fracture or traumatic malalignment        Mild but nonetheless precocious calcific atherosclerotic changes at carotid bifurcations  Workstation performed: [id number]         XR chest 1 view portable   Final Result by Shannon Belle MD (06/22 1228)      No acute cardiopulmonary disease  Workstation performed: [id number]               Procedures  ECG 12 Lead Documentation Only    Date/Time: 6/22/2021 7:34 PM  Performed by: Michelle Hilton DO  Authorized by: Michelle Hilton DO     Indications / Diagnosis:  Syncope  ECG reviewed by me, the ED Provider: yes    Patient location:  ED  Previous ECG:     Previous ECG:  Compared to current    Comparison ECG info:  6/18/2021    Similarity:  No change  Interpretation:     Interpretation: non-specific    Rate:     ECG rate:  104    ECG rate assessment: normal    Rhythm:     Rhythm: sinus rhythm    Ectopy:     Ectopy: none    QRS:     QRS axis:  Normal    QRS intervals:  Normal  Conduction:     Conduction: normal    ST segments:     ST segments:  Normal  T waves:     T waves: non-specific    Comments:                   ED Course  ED Course as of Jun 22 1942   Tue Jun 22, 2021   1236 D-Dimer, Quant: 0 46   1320 Cervical collar removed  Patient going to the bathroom with ER tech               MDM  Number of Diagnoses or Management Options  Minor head injury, initial encounter  Paranoia ([facility] [address] )  Syncope and collapse  Diagnosis management comments: Assessment and Plan:   36year old M BIBA from psychiatric facility after a syncopal episode resulting in a fall from standing  Patient describes straining significantly with constipation and then after leaving the bathroom, developing lightheadedness and syncopal episode  Likely vasovagal in nature  Will check EKG to assess for arrhythmia, d-dimer to rule out PE as low likelihood, labs to assess for anemia, electrolyte abnormalities, and kidney/liver function   Will get CT head to rule out acute intracranial hemorrhage, and ct cervical spine to rule out acute fracture  Patient is AAOx3, non-focal neuro exam, has a labile affect and becomes easily agitated, angry and paranoid, but is able to be re-directed  His workup was negative  Patient is ready for discharge  Will need follow up with PCP for reassessment  Discharge instructions with return precautions provided  Disposition  [facility] Transfer: No  Final diagnoses:   Syncope and collapse   Paranoia (HCC)   Minor head injury, initial encounter     Time reflects when diagnosis was documented in both MDM as applicable and the Disposition within this note     Time User Action Codes Description Comment    6/22/2021  1:29 PM [place]acy [place] Add [R55] Syncope and collapse     6/22/2021  1:29 PM Lacy [place] Add [F22] Paranoia ([address] )     6/22/2021  1:30 PM Lacy [place] Add [S09 90XA] Minor head injury, initial encounter       ED Disposition     ED Disposition Condition Date/Time Comment    Discharge Stable Tue Jun 22, 2021  1:29 PM [place] Rides discharge to home/self care              Follow-up Information     Follow up With Specialties Details Why Contact Info Additional Information    Bryce Wakefield MD Family Medicine Schedule an appointment as soon as possible for a visit in 3 days for re-evaluation [address]  [address] [facility] [phone]        [address] Emergency Department Emergency Medicine Go to  As needed, If symptoms worsen, for re-evaluation [address] [place] [address],9D [id number]  [address] [facility] Emergency Department, [address] [place] [address], Luis F Mcgarry, Randal Andrea [address]        Discharge Medication List as of 6/22/2021  1:30 PM      CONTINUE these medications which have NOT CHANGED    Details   acetaminophen (TYLENOL) 500 mg tablet Take 500 mg by mouth every 6 (six) hours as needed, Historical Med      Ascorbic Acid, Vitamin C, (VITAMIN C) 100 MG tablet Take 100 mg by mouth daily, Historical Med      aspirin 81 mg chewable tablet Chew 81 mg daily, Starting Tue 12/29/2020, Until Wed 12/29/2021, Historical Med      atorvastatin (LIPITOR) 40 mg tablet Take 40 mg by mouth daily, Starting Mon 3/22/2021, Until Sun 6/20/2021, Historical Med      DULoxetine (CYMBALTA) 60 mg delayed release capsule Take 60 mg by mouth daily, Starting Mon 3/22/2021, Until Sun 6/20/2021, Historical Med      Folic Acid-Cholecalciferol 1-5000 MG-UNIT TABS Take by mouth daily, Historical Med      hydrOXYzine HCL (ATARAX) 25 mg tablet Take 25 mg by mouth every 6 (six) hours as needed, Starting Mon 6/14/2021, Historical Med      polyethylene glycol (GLYCOLAX) 17 GM/SCOOP powder Take 17 g by mouth daily, Starting Mon 6/14/2021, Until Thu 6/17/2021, Historical Med           No discharge procedures on file      PDMP Review     None          ED Provider  Electronically Signed by         Jose A Douglas DO  06/22/21 1943

## 2021-06-22 NOTE — ASSESSMENT & PLAN NOTE
· Pressures appear stable  · Not currently any medications  · He is on atorvastatin, continue and outpatient for ongoing management  · Would repeat lipid panel this admission

## 2021-06-22 NOTE — ED NOTES
Reports provided to [facility] RN at [facility], RN  06/22/21 6397

## 2021-06-22 NOTE — ASSESSMENT & PLAN NOTE
 Admission labs reviewed, CBC, CMP,TSH, UA, reviewed, acceptable   UDS negative   COVID-19 negative   EKG reveals NSR, 98bpm,    Medically stable for continued inpatient psychiatric treatment

## 2021-06-22 NOTE — CASE MANAGEMENT
LORENZO received "in basket" back from Allison stating "[id number] Celi Larios, we have openings for next week and we can schedule this patient for virtual PHP at [facility] once you let us know the d/c date " CM let them know we would notify them as soon as we have a d/c date

## 2021-06-22 NOTE — PLAN OF CARE
Problem: Alteration in Thoughts and Perception  Goal: Treatment Goal: Gain control of psychotic behaviors/thinking, reduce/eliminate presenting symptoms and demonstrate improved reality functioning upon discharge  Outcome: Progressing     Problem: Depression  Goal: Treatment Goal: Demonstrate behavioral control of depressive symptoms, verbalize feelings of improved mood/affect, and adopt new coping skills prior to discharge  Outcome: Progressing     Problem: Depression  Goal: Attend and participate in unit activities, including therapeutic, recreational, and educational groups  Description: Interventions:  - Provide therapeutic and educational activities daily, encourage attendance and participation, and document same in the medical record   Outcome: Progressing

## 2021-06-22 NOTE — CASE MANAGEMENT
LORENZO spoke with pt's wife Aly Ledezmaw [phone] notified that pt was transferred to the ED for evaluation from fall on unit, provided SLUB ED number if she would like updates  Aly Sanchez stated if we are unable to get her at her number she can also be reached on husbands phone at [phone]  LORENZO stated we will update Aly Sanchez when pt returns

## 2021-06-22 NOTE — DISCHARGE INSTR - APPOINTMENTS
Stacy Quintero RN, our [employer], will be calling you after your discharge, on the phone number that you provided  She will be available as an additional support, if needed  If you wish to speak with her, you may contact Kaylee Osborn at [phone]

## 2021-06-22 NOTE — NURSING NOTE
Pt complaining of splitting headache this morning  Pt reported to this writer "my wife is in the room next door"  Pt states "I know I am at [facility] but I know my wife is here because of realization"  Pt CFS  Denies any previous AVH  Denies SI/HI  Pt disorganized in conversation and paranoid  Reports "I've got my list right here to talk with the doctor about"  Denies any other questions or concerns

## 2021-06-22 NOTE — NURSING NOTE
Pt loudly rambling to self while walking fast to room  Pt screaming that staff did something to pt's wife  Pt very paranoid and disorganized  Pt threw off shirt and kept demanding water, referring to it as "holy water"  Pt kicked foot against barajas wall  Pt agreed to take medication by mouth  Pt took thorazine 50 mg po and Benadryl 50 mg po @0937
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
## 2021-06-22 NOTE — NURSING NOTE
Pt agitated at times thinks his wife is in hospital , difficult to redirect becoming increasingly ly  More agitated, medicated  With good results
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
## 2021-06-22 NOTE — NURSING NOTE
Pt disorganized and screaming  Pt received Haldol 10 mg po @1629 and cogentin 1 mg po  Broset 3  Upon follow up, pt visibly calmer and redirectable  Medication effective
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
## 2021-06-22 NOTE — CONSULTS
[address] [facility] 1980, 36 y o  male MRN: [id number]  Unit/Bed#: [facility] 251-01 Encounter: [id number]  Primary Care Provider: Brien Palomares MD   Date and time admitted to hospital: 6/21/2021  6:10 PM    Inpatient consult for Medical Clearance for [facility] patient  Consult performed by: Andrew Maki PA-C  Consult ordered by: Juana Batista PA-C          Medical clearance for psychiatric admission  Assessment & Plan   Admission labs reviewed, CBC, CMP,TSH, UA, reviewed, acceptable   UDS negative   COVID-19 negative   EKG reveals NSR, 98bpm,    Medically stable for continued inpatient psychiatric treatment      Benign essential HTN  Assessment & Plan  · Pressures appear stable  · Not currently any medications  · He is on atorvastatin, continue and outpatient for ongoing management  · Would repeat lipid panel this admission      VTE Prophylaxis:   Low Risk (Score 0-2) - Encourage Ambulation  Recommendations for Discharge:  · Discharge timeline per primary team   Routine follow-up with primary care provider  Counseling / Coordination of Care Time: 30 minutes Greater than 50% of total time spent on patient counseling and coordination of care  Collaboration of Care: Were Recommendations Directly Discussed with Primary Treatment Team? No    History of Present Illness:  Shari Mauro is a 36 y o  male who is originally admitted to the behavioral health service due to paranoia and hallucinations  We are consulted for management of chronic medical conditions  Patient denies any chronic medical conditions for which he takes daily medications  Chart review reveals history of hypertension  Continue all prior to admission medications  Patient denies recent travel, illness or sick contacts  Patient denies smoking, drinking or drug use  Available admission lab work and vitals are acceptable  Patient feels a baseline physical health    Patient appears medically stable for inpatient psychiatric treatment at this time  Review of Systems:  Review of Systems   Psychiatric/Behavioral: Positive for agitation and hallucinations  All other systems reviewed and are negative  Past Medical and Surgical History:   Past Medical History:   Diagnosis Date    Anxiety     Hypertension        Past Surgical History:   Procedure Laterality Date    CORNEAL TRANSPLANT      EYE SURGERY      TONSILLECTOMY         Meds/Allergies:  PTA meds:   Cannot display prior to admission medications because the patient has not been admitted in this contact  Allergies: Allergies   Allergen Reactions    Adhesive [Medical Tape] Rash       Social History:  Marital Status: /Civil Union  Substance Use History:   Social History     Substance and Sexual Activity   Alcohol Use Not Currently     Social History     Tobacco Use   Smoking Status Former Smoker   Smokeless Tobacco Never Used     Social History     Substance and Sexual Activity   Drug Use Never       Family History:  History reviewed  No pertinent family history  Physical Exam:   Vitals:   Blood Pressure: 119/69 (06/22/21 1109)  Pulse: 75 (06/22/21 1109)  Temperature: 98 7 °F (37 1 °C) (06/22/21 1109)  Temp Source: Tympanic (06/22/21 1109)  Respirations: 18 (06/22/21 1109)  Height: 5' 9" (175 3 cm) (06/21/21 1928)  Weight - Scale: 93 4 kg (206 lb) (06/21/21 1928)  SpO2: 100 % (06/21/21 1928)    Physical Exam  Vitals and nursing note reviewed  Constitutional:       General: He is not in acute distress  Appearance: Normal appearance  He is well-developed  HENT:      Head: Normocephalic and atraumatic  Eyes:      General: No scleral icterus  Conjunctiva/sclera: Conjunctivae normal    Cardiovascular:      Rate and Rhythm: Normal rate and regular rhythm  Heart sounds: No murmur heard  Pulmonary:      Effort: Pulmonary effort is normal       Breath sounds: No wheezing, rhonchi or rales  Abdominal:      General: There is no distension  Palpations: Abdomen is soft  Skin:     General: Skin is warm and dry  Neurological:      General: No focal deficit present  Mental Status: He is alert  Psychiatric:         Mood and Affect: Mood normal  Affect is angry  Behavior: Behavior is agitated  Comments: Minimally participatory with exam        Additional Data:   Lab Results:    Results from last 7 days   Lab Units 06/22/21  1157   WBC Thousand/uL 9 16   HEMOGLOBIN g/dL 15 5   HEMATOCRIT % 46 1   PLATELETS Thousands/uL 282   NEUTROS PCT % 69   LYMPHS PCT % 21   MONOS PCT % 9   EOS PCT % 1     Results from last 7 days   Lab Units 06/22/21  1157   SODIUM mmol/L 140   POTASSIUM mmol/L 4 0   CHLORIDE mmol/L 104   CO2 mmol/L 28   BUN mg/dL 9   CREATININE mg/dL 0 99   ANION GAP mmol/L 8   CALCIUM mg/dL 9 5   ALBUMIN g/dL 4 2   TOTAL BILIRUBIN mg/dL 0 70   ALK PHOS U/L 88   ALT U/L 25   AST U/L 16   GLUCOSE RANDOM mg/dL 118         Results from last 7 days   Lab Units 06/22/21  1157   TROPONIN I ng/mL <0 02     Lab Results   Component Value Date/Time    HGBA1C 5 4 01/01/2021 05:34 AM         Results from last 7 days   Lab Units 06/18/21  1816   LACTIC ACID mmol/L 1 2       Imaging: No pertinent imaging reviewed  No orders to display       EKG, Pathology, and Other Studies Reviewed on Admission:   · EKG: NSR  HR 98bpm     ** Please Note: This note may have been constructed using a voice recognition system   **
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
## 2021-06-23 LAB
ATRIAL RATE: 104 BPM
P AXIS: 50 DEGREES
PR INTERVAL: 126 MS
QRS AXIS: 44 DEGREES
QRSD INTERVAL: 76 MS
QT INTERVAL: 332 MS
QTC INTERVAL: 436 MS
T WAVE AXIS: 16 DEGREES
VENTRICULAR RATE: 104 BPM

## 2021-06-23 PROCEDURE — 93010 ELECTROCARDIOGRAM REPORT: CPT | Performed by: INTERNAL MEDICINE

## 2021-06-23 PROCEDURE — 99232 SBSQ HOSP IP/OBS MODERATE 35: CPT | Performed by: STUDENT IN AN ORGANIZED HEALTH CARE EDUCATION/TRAINING PROGRAM

## 2021-06-23 RX ORDER — MIRTAZAPINE 15 MG/1
15 TABLET, FILM COATED ORAL
Status: DISCONTINUED | OUTPATIENT
Start: 2021-06-23 | End: 2021-06-30 | Stop reason: HOSPADM

## 2021-06-23 RX ORDER — HALOPERIDOL 5 MG
5 TABLET ORAL 2 TIMES DAILY
Status: DISCONTINUED | OUTPATIENT
Start: 2021-06-23 | End: 2021-06-30 | Stop reason: HOSPADM

## 2021-06-23 RX ORDER — ATORVASTATIN CALCIUM 10 MG/1
10 TABLET, FILM COATED ORAL
Status: DISCONTINUED | OUTPATIENT
Start: 2021-06-23 | End: 2021-06-30 | Stop reason: HOSPADM

## 2021-06-23 RX ADMIN — ACETAMINOPHEN 975 MG: 325 TABLET, FILM COATED ORAL at 05:12

## 2021-06-23 RX ADMIN — HALOPERIDOL 5 MG: 5 TABLET ORAL at 17:14

## 2021-06-23 RX ADMIN — MELATONIN 3 MG: at 21:39

## 2021-06-23 RX ADMIN — DIPHENHYDRAMINE HYDROCHLORIDE 25 MG: 25 TABLET ORAL at 05:25

## 2021-06-23 RX ADMIN — ASPIRIN 81 MG: 81 TABLET, COATED ORAL at 09:46

## 2021-06-23 RX ADMIN — FOLIC ACID 1 MG: 1 TABLET ORAL at 10:15

## 2021-06-23 RX ADMIN — OXYCODONE HYDROCHLORIDE AND ACETAMINOPHEN 250 MG: 500 TABLET ORAL at 09:46

## 2021-06-23 RX ADMIN — HALOPERIDOL 5 MG: 5 TABLET ORAL at 10:14

## 2021-06-23 RX ADMIN — ATORVASTATIN CALCIUM 10 MG: 10 TABLET, FILM COATED ORAL at 17:14

## 2021-06-23 RX ADMIN — MIRTAZAPINE 15 MG: 15 TABLET, FILM COATED ORAL at 21:39

## 2021-06-23 NOTE — PROGRESS NOTES
Pt was irritable and delusional yesterday morning  Was at nurses station and fell to the floor  Transferred to ED and they could not find anything wrong, sent pt back to [facility]  Wife dropped of pt CPAP machine and pt mood improved after he saw her       PRN: Tylenol, Cogentin, Thorazine, Benadryl, Haldol, Atarax, Zyprexa    DC: TBD      06/23/21 0759   Team Meeting   Meeting Type Daily Rounds   Team Members Present   Team Members Present Physician;Nurse;;Occupational Therapist   Physician Team Member Dr Yue Dunham / Frankie Gamez / Ellen Amado Team Member Contreras Monreal / Christopher Tracy Management Team Member Sheila Vazquez / Janene Horner   OT Team Member Geni Rao / Art Therapy Student   Patient/Family Present   Patient Present No   Patient's Family Present No

## 2021-06-23 NOTE — NURSING NOTE
Pt initially refused morning medications  He has a hand written list of medications that he states he will only take  Cymbalta and folic acid are not on his list   Cymbalta has been discontinued and new order for Haldol 5mg started  Patient refused the haldol even with education given by this writer  Patient states he is rewritting history and his his story, ignoring the past and looking to the future  "It's magic!" Charge nurse spoke to patient discussing the medications that he and the doctor went over this morning  Patient was willing to take haldol and folic acid with presence of staff  Pt was hesitant but cooperative  Staff stayed with patient to help with therapeutic support
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
## 2021-06-23 NOTE — NURSING NOTE
Patients wife, Erika Keenan, called to get an update  Wife was given update on patient's paranoia of medications and thoughts of "his wife is here somewhere and we are not letting him see her"  Erika Keenan reported that talking with RN last evening, it was thought there is a female patient here on the unit with red hair that may resemble his wife causing confusion  Erika Keenan stated her frustration with all the avenues she tried to get help for her  with several hospitals sending him home "after giving a bandade of ativan or blood pressure medication"  This writer validated her feelings and reassured her that her  is in a safe place and everyone is involved in his treatment that is best for his mental illness  Medication changes were discussed along with his current behaviors  Erika Keenan was appreciative and expressed agreement to his medication change from cymbalta to haldol  She also states that Mandeep Salazar does not want his parents to know he is here  She reports they may start calling around as they now know he was at [facility]  Eyal's parents names are Onofre Allen (mom) and Paul Jumana (dad)  Erika Keenan states his father, Paul Denney, is a trigger  Erika Shlomo was appreciative of writers time having no other questions or concerns, phone conversations ended

## 2021-06-23 NOTE — NURSING NOTE
Pt given Prn Benadryl 25mg 0525 for itching legs  On follow up, pt reported legs are better  Medication effective

## 2021-06-23 NOTE — NURSING NOTE
Pt received Benadryl 25 mg po @1931  Upon follow up, pt reported "It still itches but not as much I guess"  Medication moderately effective

## 2021-06-23 NOTE — PROGRESS NOTES
Progress Note - [address] 36 y o  male MRN: [id number]  Unit/Bed#: [facility] 713-98 Encounter: [id number]    Assessment/Plan   Principal Problem:    Mood disorder ([address] )  Active Problems:    Medical clearance for psychiatric admission    Benign essential HTN      Subjective: Patient was seen, chart reviewed and case discussed with team  Patient was transferred to ER for altered mental status, imaging and labs are WNL and was sent back to unit  He was loud and agitated after coming to unit, was given haldol 10 mg  Today patient appears lying in bed, no eye contact, paranoid, guarded, soft and slow speech  Patient says that he is depressed and anxious for the past 2 weeks  He also feels that every body is following and against him for the past 2 weeks  He also endorses voices and " see things", refused to elaborate further and stopped talking  Patient appears withdrawn, depressed and internally preoccupied     Psychiatric Review of Systems:  Behavior over the last 24 hours:  improved  Sleep: normal  Appetite: poor  Medication side effects: No  ROS: no complaints, all others negative    Current Medications:  Current Facility-Administered Medications   Medication Dose Route Frequency    acetaminophen (TYLENOL) tablet 650 mg  650 mg Oral Q6H PRN    acetaminophen (TYLENOL) tablet 650 mg  650 mg Oral Q4H PRN    acetaminophen (TYLENOL) tablet 975 mg  975 mg Oral Q6H PRN    aluminum-magnesium hydroxide-simethicone (MYLANTA) oral suspension 30 mL  30 mL Oral Q4H PRN    ascorbic acid (VITAMIN C) tablet 250 mg  250 mg Oral Daily    aspirin (ECOTRIN LOW STRENGTH) EC tablet 81 mg  81 mg Oral Daily    benztropine (COGENTIN) tablet 1 mg  1 mg Oral Q4H PRN Max 6/day    chlorproMAZINE (THORAZINE) tablet 50 mg  50 mg Oral Q6H PRN    hydrOXYzine HCL (ATARAX) tablet 50 mg  50 mg Oral Q6H PRN Max 4/day    Or    diphenhydrAMINE (BENADRYL) injection 50 mg  50 mg Intramuscular Q6H PRN    diphenhydrAMINE (BENADRYL) tablet 25 mg  25 mg Oral Q4H PRN    DULoxetine (CYMBALTA) delayed release capsule 60 mg  60 mg Oral Daily    folic acid (FOLVITE) tablet 1 mg  1 mg Oral Daily    haloperidol (HALDOL) tablet 10 mg  10 mg Oral Q8H PRN    Or    haloperidol lactate (HALDOL) injection 10 mg  10 mg Intramuscular Q8H PRN    haloperidol (HALDOL) tablet 2 mg  2 mg Oral Q6H PRN    haloperidol (HALDOL) tablet 5 mg  5 mg Oral Q6H PRN    haloperidol (HALDOL) tablet 5 mg  5 mg Oral BID    hydrOXYzine HCL (ATARAX) tablet 100 mg  100 mg Oral Q6H PRN Max 4/day    Or    LORazepam (ATIVAN) injection 2 mg  2 mg Intramuscular Q6H PRN    hydrOXYzine HCL (ATARAX) tablet 25 mg  25 mg Oral Q6H PRN Max 4/day    LORazepam (ATIVAN) tablet 1 mg  1 mg Oral Q6H PRN    melatonin tablet 3 mg  3 mg Oral HS    senna-docusate sodium (SENOKOT S) 8 6-50 mg per tablet 1 tablet  1 tablet Oral Daily PRN       Behavioral Health Medications: all current active meds have been reviewed  Vitals:  Vitals:    06/23/21 0722   BP: 155/100   Pulse: 89   Resp: 16   Temp: 99 °F (37 2 °C)   SpO2:        Laboratory results:  I have personally reviewed all pertinent laboratory/tests results  Mental Status Evaluation:  Appearance:  age appropriate   Behavior:  guarded   Speech:  soft   Mood:  anxious and depressed   Affect:  constricted and flat   Language Appropriate   Thought Process:  goal directed and logical   Thought Content:  delusions  paranoid   Perceptual Disturbances:  Auditory hallucinations without commands and Visual hallucinations   Risk Potential: Suicidal Ideations none, Homicidal Ideations none and Potential for Aggression No   Sensorium:  person, place and time/date   Cognition:  recent and remote memory grossly intact   Consciousness:  alert    Attention: attention span appeared shorter than expected for age   Insight:  fair   Judgment: fair   Gait/Station: normal gait/station   Motor Activity: no abnormal movements     Progress Toward Goals: Progressing    Recommended Treatment: Continue with pharmacotherapy, group therapy, milieu therapy and occupational therapy  1  Stop Cymbalta  2  Start Remeron 15 mg PO HS  3  Start Haldol 5 mg BID  Risks, benefits and possible side effects of Medications:   Risks, benefits, and possible side effects of medications explained to patient and patient verbalizes understanding  normal...

## 2021-06-23 NOTE — NURSING NOTE
Pt visible on unit  Pt denies SI/Hi/AVH  Pt reported "I'm trying to work on my medication list " Pt requesting to complete menu for Friday  Pt AOOx4  Pt reported 'I'm here to get my mood medication straightened out " Pt contracts for safety on the unit  Denies any questions or concerns

## 2021-06-24 PROCEDURE — 99232 SBSQ HOSP IP/OBS MODERATE 35: CPT | Performed by: STUDENT IN AN ORGANIZED HEALTH CARE EDUCATION/TRAINING PROGRAM

## 2021-06-24 RX ORDER — IBUPROFEN 400 MG/1
400 TABLET ORAL EVERY 6 HOURS PRN
Status: DISCONTINUED | OUTPATIENT
Start: 2021-06-24 | End: 2021-06-30 | Stop reason: HOSPADM

## 2021-06-24 RX ADMIN — ACETAMINOPHEN 975 MG: 325 TABLET, FILM COATED ORAL at 12:30

## 2021-06-24 RX ADMIN — FOLIC ACID 1 MG: 1 TABLET ORAL at 08:04

## 2021-06-24 RX ADMIN — HALOPERIDOL 5 MG: 5 TABLET ORAL at 17:29

## 2021-06-24 RX ADMIN — OXYCODONE HYDROCHLORIDE AND ACETAMINOPHEN 250 MG: 500 TABLET ORAL at 08:03

## 2021-06-24 RX ADMIN — IBUPROFEN 400 MG: 400 TABLET ORAL at 15:01

## 2021-06-24 RX ADMIN — MIRTAZAPINE 15 MG: 15 TABLET, FILM COATED ORAL at 21:37

## 2021-06-24 RX ADMIN — ATORVASTATIN CALCIUM 10 MG: 10 TABLET, FILM COATED ORAL at 17:29

## 2021-06-24 RX ADMIN — ASPIRIN 81 MG: 81 TABLET, COATED ORAL at 08:03

## 2021-06-24 RX ADMIN — HALOPERIDOL 5 MG: 5 TABLET ORAL at 08:04

## 2021-06-24 RX ADMIN — DIPHENHYDRAMINE HYDROCHLORIDE 25 MG: 25 TABLET ORAL at 17:30

## 2021-06-24 RX ADMIN — DIPHENHYDRAMINE HYDROCHLORIDE 25 MG: 25 TABLET ORAL at 12:31

## 2021-06-24 RX ADMIN — MELATONIN 3 MG: at 21:37

## 2021-06-24 RX ADMIN — ACETAMINOPHEN 975 MG: 325 TABLET, FILM COATED ORAL at 19:21

## 2021-06-24 NOTE — PROGRESS NOTES
Progress Note - [address] 36 y o  male MRN: [id number]  Unit/Bed#: [facility] 773-46 Encounter: [id number]    Assessment/Plan   Principal Problem:    Mood disorder ([address] )  Active Problems:    Medical clearance for psychiatric admission    Benign essential HTN      Subjective: Patient was seen, chart reviewed and case discussed with team  As per report patient was disrespectful to staff, disorganized, paranoid, defiant and needed encouragement from staff to take medications  Patient appears irritable, paranoid, delusional and internally preoccupied  Voices have diminished but continues to have visual hallucinations at times  Sleep and appetite have improved     Psychiatric Review of Systems:  Behavior over the last 24 hours:  improved  Sleep: normal  Appetite: normal  Medication side effects: No  ROS: no complaints, all others negative    Current Medications:  Current Facility-Administered Medications   Medication Dose Route Frequency    acetaminophen (TYLENOL) tablet 650 mg  650 mg Oral Q6H PRN    acetaminophen (TYLENOL) tablet 650 mg  650 mg Oral Q4H PRN    acetaminophen (TYLENOL) tablet 975 mg  975 mg Oral Q6H PRN    aluminum-magnesium hydroxide-simethicone (MYLANTA) oral suspension 30 mL  30 mL Oral Q4H PRN    ascorbic acid (VITAMIN C) tablet 250 mg  250 mg Oral Daily    aspirin (ECOTRIN LOW STRENGTH) EC tablet 81 mg  81 mg Oral Daily    atorvastatin (LIPITOR) tablet 10 mg  10 mg Oral Daily With Dinner    benztropine (COGENTIN) tablet 1 mg  1 mg Oral Q4H PRN Max 6/day    chlorproMAZINE (THORAZINE) tablet 50 mg  50 mg Oral Q6H PRN    hydrOXYzine HCL (ATARAX) tablet 50 mg  50 mg Oral Q6H PRN Max 4/day    Or    diphenhydrAMINE (BENADRYL) injection 50 mg  50 mg Intramuscular Q6H PRN    diphenhydrAMINE (BENADRYL) tablet 25 mg  25 mg Oral E4P PRN    folic acid (FOLVITE) tablet 1 mg  1 mg Oral Daily    haloperidol (HALDOL) tablet 10 mg  10 mg Oral Q8H PRN    Or    haloperidol lactate (HALDOL) injection 10 mg  10 mg Intramuscular Q8H PRN    haloperidol (HALDOL) tablet 2 mg  2 mg Oral Q6H PRN    haloperidol (HALDOL) tablet 5 mg  5 mg Oral Q6H PRN    haloperidol (HALDOL) tablet 5 mg  5 mg Oral BID    hydrOXYzine HCL (ATARAX) tablet 100 mg  100 mg Oral Q6H PRN Max 4/day    Or    LORazepam (ATIVAN) injection 2 mg  2 mg Intramuscular Q6H PRN    hydrOXYzine HCL (ATARAX) tablet 25 mg  25 mg Oral Q6H PRN Max 4/day    LORazepam (ATIVAN) tablet 1 mg  1 mg Oral Q6H PRN    melatonin tablet 3 mg  3 mg Oral HS    mirtazapine (REMERON) tablet 15 mg  15 mg Oral HS    senna-docusate sodium (SENOKOT S) 8 6-50 mg per tablet 1 tablet  1 tablet Oral Daily PRN       Behavioral Health Medications: all current active meds have been reviewed  Vitals:  Vitals:    06/24/21 0735   BP: 132/84   Pulse: 83   Resp: 18   Temp: 97 9 °F (36 6 °C)   SpO2:        Laboratory results:  I have personally reviewed all pertinent laboratory/tests results  Mental Status Evaluation:  Appearance:  age appropriate   Behavior:  guarded   Speech:  normal pitch and normal volume   Mood:  anxious   Affect:  constricted and flat   Language Appropriate   Thought Process:  goal directed   Thought Content:  delusions  persecutory   Perceptual Disturbances: Visual hallucinations   Risk Potential: Suicidal Ideations none, Homicidal Ideations none and Potential for Aggression No   Sensorium:  person, place, time/date, situation and day of week   Cognition:  recent and remote memory grossly intact   Consciousness:  alert    Attention: attention span appeared shorter than expected for age   Insight:  fair   Judgment: fair   Gait/Station: normal gait/station   Motor Activity: no abnormal movements     Progress Toward Goals: Progressing    Recommended Treatment: Continue with pharmacotherapy, group therapy, milieu therapy and occupational therapy  Advancement-Rotation Flap Text: The defect edges were debeveled with a #15 scalpel blade.  Given the location of the defect, shape of the defect and the proximity to free margins an advancement-rotation flap was deemed most appropriate.  Using a sterile surgical marker, an appropriate flap was drawn incorporating the defect and placing the expected incisions within the relaxed skin tension lines where possible. The area thus outlined was incised deep to adipose tissue with a #15 scalpel blade.  The skin margins were undermined to an appropriate distance in all directions utilizing iris scissors.

## 2021-06-24 NOTE — TREATMENT PLAN
TREATMENT PLAN REVIEW - [address] 36 y o  1980 male MRN: [id number]    [address] [place] [address] Room / Bed: [place] [address]/[facility] 191-02 Encounter: [id number]          Admit Date/Time:  6/21/2021  6:10 PM    Treatment Team: Attending Provider: Selvin Wayne MD; Consulting Physician: Alina Ragsdale MD; : Rachel Barth; Charge Nurse: Elder Enriquez RN; Licensed Practical Nurse: Chandra Nichols LPN; Patient Care Technician: Bebo Lopez; Registered Nurse: Addi Hendricks, ZACHARIAH; Care Manager: Juan Tarango RN; Registered Nurse: Celi Garcia, ZACHARIAH; Occupational Therapy Assistant: HUSAM Riggins; Charge Nurse: Alberto Dodge RN; Patient Care Technician: Sofia King; Security: Prince Robles;  Patient Care Assistant: Peggy Hernandez    Diagnosis: Principal Problem:    Mood disorder [facility])  Active Problems:    Medical clearance for psychiatric admission    Benign essential HTN      Patient Strengths/Assets: cooperative, motivation for treatment/growth, patient is on a voluntary commitment    Patient Barriers/Limitations: none    Short Term Goals: decrease in depressive symptoms, decrease in anxiety symptoms, decrease in paranoid thoughts, decrease in psychotic symptoms, improvement in self care, sleep improvement, improvement in appetite, mood stabilization    Long Term Goals: improvement in depression, improvement in anxiety, resolution of depressive symptoms, stabilization of mood, adequate self care, adequate sleep, adequate appetite    Progress Towards Goals: starting psychiatric medications as prescribed    Recommended Treatment: medication management, patient medication education, group therapy, milieu therapy, continued Behavioral Health psychiatric evaluation/assessment process    Treatment Frequency: daily medication monitoring, group and milieu therapy daily, monitoring through interdisciplinary rounds, monitoring through weekly patient care conferences    Expected Discharge Date:  5-7 days    Discharge Plan: referral for outpatient medication management with a psychiatrist, referral for outpatient psychotherapy    Treatment Plan Created/Updated By: Mariel Rodriguez MD

## 2021-06-24 NOTE — PROGRESS NOTES
Patient approached nurse reporting his head ache remained unrelieved with motrin and tylenol but that he was going to attempt to lay down and rest   Pt reported some tinnitus and dizziness, writer informed pt that this could be a side effect of the benadryl he has been taking frequently  Pt asked if he can have additional dose of tylenol whenever able, Jose Pereyra acknowledged pt request   No agitation noted, he remains cooperative and seems less paranoid and disorganized this evening

## 2021-06-24 NOTE — PROGRESS NOTES
Pt irritable during the day, defiant, sitting in the middle of the barajas  Did not want to take medications  Was pushing at the fire door in the evening  Slept through the night       PRN: Tylenol, Benadryl    DC: TBD    06/24/21 0800   Team Meeting   Meeting Type Daily Rounds   Team Members Present   Team Members Present Physician;Nurse;;Occupational Therapist   Physician Team Member Dr Mili Contreras / Issa Lobo / Garcaí Arita Team Member Merced Vegas / Pat Caruso Management Team Member Lin Nichols / Irish Richardson   OT Team Member Lulú Garvin / Art Therapy Student   Patient/Family Present   Patient Present No   Patient's Family Present No

## 2021-06-24 NOTE — NURSING NOTE
Pt in room, peering out door this evening, guarded and appears paranoid in conversation  Denies all symptoms or states, "I don't think so " Pt later walking halls and redirected from unit doors and rear fire door, pushing on doors or waiting for staff or Security to open  Wandering halls, intrusive with other peers conversations
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
## 2021-06-24 NOTE — CMS CERTIFICATION NOTE
Recertification: Based upon physical, mental and social evaluations, I certify that inpatient psychiatric services continue to be medically necessary for this patient for a duration of 7 midnights for the treatment of  Mood disorder [facility])   Available alternative community resources still do not meet the patient's mental health care needs  I further attest that an established written individualized plan of care has been updated and is outlined in the patient's medical records

## 2021-06-24 NOTE — PLAN OF CARE
Problem: Alteration in Thoughts and Perception  Goal: Treatment Goal: Gain control of psychotic behaviors/thinking, reduce/eliminate presenting symptoms and demonstrate improved reality functioning upon discharge  Outcome: Progressing  Goal: Verbalize thoughts and feelings  Description: Interventions:  - Promote a nonjudgmental and trusting relationship with the patient through active listening and therapeutic communication  - Assess patient's level of functioning, behavior and potential for risk  - Engage patient in 1 on 1 interactions  - Encourage patient to express fears, feelings, frustrations, and discuss symptoms    - [place] patient to reality, help patient recognize reality-based thinking   - Administer medications as ordered and assess for potential side effects  - Provide the patient education related to the signs and symptoms of the illness and desired effects of prescribed medications  Outcome: Progressing  Goal: Refrain from acting on delusional thinking/internal stimuli  Description: Interventions:  - Monitor patient closely, per order   - Utilize least restrictive measures   - Set reasonable limits, give positive feedback for acceptable   - Administer medications as ordered and monitor of potential side effects  Outcome: Progressing     Problem: Depression  Goal: Treatment Goal: Demonstrate behavioral control of depressive symptoms, verbalize feelings of improved mood/affect, and adopt new coping skills prior to discharge  Outcome: Progressing  Goal: Verbalize thoughts and feelings  Description: Interventions:  - Assess and re-assess patient's level of risk   - Engage patient in 1:1 interactions, daily, for a minimum of 15 minutes   - Encourage patient to express feelings, fears, frustrations, hopes   Outcome: Progressing  Goal: Attend and participate in unit activities, including therapeutic, recreational, and educational groups  Description: Interventions:  - Provide therapeutic and educational activities daily, encourage attendance and participation, and document same in the medical record   Outcome: Progressing  Goal: Complete daily ADLs, including personal hygiene independently, as able  Description: Interventions:  - Observe, teach, and assist patient with ADLS  -  Monitor and promote a balance of rest/activity, with adequate nutrition and elimination   Outcome: Progressing     Problem: Anxiety  Goal: Anxiety is at manageable level  Description: Interventions:  - Assess and monitor patient's anxiety level  - Monitor for signs and symptoms (heart palpitations, chest pain, shortness of breath, headaches, nausea, feeling jumpy, restlessness, irritable, apprehensive)  - Collaborate with interdisciplinary team and initiate plan and interventions as ordered    - [place] patient to unit/surroundings  - Explain treatment plan  - Encourage participation in care  - Encourage verbalization of concerns/fears  - Identify coping mechanisms  - Assist in developing anxiety-reducing skills  - Administer/offer alternative therapies  - Limit or eliminate stimulants  Outcome: Progressing     Problem: Ineffective Coping  Goal: Participates in unit activities  Description: Interventions:  - Provide therapeutic environment   - Provide required programming   - Redirect inappropriate behaviors   Outcome: Progressing     Problem: DISCHARGE PLANNING  Goal: Discharge to home or other facility with appropriate resources  Description: INTERVENTIONS:  - Identify barriers to discharge w/patient and caregiver  - Arrange for needed discharge resources and transportation as appropriate  - Identify discharge learning needs (meds, wound care, etc )  - Arrange for interpretive services to assist at discharge as needed  - Refer to Case Management Department for coordinating discharge planning if the patient needs post-hospital services based on physician/advanced practitioner order or complex needs related to functional status, cognitive ability, or social support system  Outcome: Progressing

## 2021-06-24 NOTE — NURSING NOTE
Patient disrespectful to writer upon administration of a m  medications  He attempted to hand writer a  "note from wife" that appeared to be a green paper with lotion on it  Writer talked patient into being cooperative with taking a m  scheduled meds  He then reported that he would "be in his room all day either sleeping or doing push ups " He then walked back to his assigned room  This afternoon patient cooperative and pleasant

## 2021-06-24 NOTE — PLAN OF CARE
Problem: Alteration in Thoughts and Perception  Goal: Treatment Goal: Gain control of psychotic behaviors/thinking, reduce/eliminate presenting symptoms and demonstrate improved reality functioning upon discharge  Outcome: Progressing  Goal: Verbalize thoughts and feelings  Description: Interventions:  - Promote a nonjudgmental and trusting relationship with the patient through active listening and therapeutic communication  - Assess patient's level of functioning, behavior and potential for risk  - Engage patient in 1 on 1 interactions  - Encourage patient to express fears, feelings, frustrations, and discuss symptoms    - [place] patient to reality, help patient recognize reality-based thinking   - Administer medications as ordered and assess for potential side effects  - Provide the patient education related to the signs and symptoms of the illness and desired effects of prescribed medications  Outcome: Progressing  Goal: Refrain from acting on delusional thinking/internal stimuli  Description: Interventions:  - Monitor patient closely, per order   - Utilize least restrictive measures   - Set reasonable limits, give positive feedback for acceptable   - Administer medications as ordered and monitor of potential side effects  Outcome: Progressing     Problem: Depression  Goal: Treatment Goal: Demonstrate behavioral control of depressive symptoms, verbalize feelings of improved mood/affect, and adopt new coping skills prior to discharge  Outcome: Progressing  Goal: Verbalize thoughts and feelings  Description: Interventions:  - Assess and re-assess patient's level of risk   - Engage patient in 1:1 interactions, daily, for a minimum of 15 minutes   - Encourage patient to express feelings, fears, frustrations, hopes   Outcome: Progressing  Goal: Attend and participate in unit activities, including therapeutic, recreational, and educational groups  Description: Interventions:  - Provide therapeutic and educational activities daily, encourage attendance and participation, and document same in the medical record   Outcome: Progressing  Goal: Complete daily ADLs, including personal hygiene independently, as able  Description: Interventions:  - Observe, teach, and assist patient with ADLS  -  Monitor and promote a balance of rest/activity, with adequate nutrition and elimination   Outcome: Progressing     Problem: Anxiety  Goal: Anxiety is at manageable level  Description: Interventions:  - Assess and monitor patient's anxiety level  - Monitor for signs and symptoms (heart palpitations, chest pain, shortness of breath, headaches, nausea, feeling jumpy, restlessness, irritable, apprehensive)  - Collaborate with interdisciplinary team and initiate plan and interventions as ordered    - [place] patient to unit/surroundings  - Explain treatment plan  - Encourage participation in care  - Encourage verbalization of concerns/fears  - Identify coping mechanisms  - Assist in developing anxiety-reducing skills  - Administer/offer alternative therapies  - Limit or eliminate stimulants  Outcome: Progressing

## 2021-06-25 PROCEDURE — 99232 SBSQ HOSP IP/OBS MODERATE 35: CPT | Performed by: STUDENT IN AN ORGANIZED HEALTH CARE EDUCATION/TRAINING PROGRAM

## 2021-06-25 RX ORDER — OMEGA-3S/DHA/EPA/FISH OIL/D3 300MG-1000
400 CAPSULE ORAL DAILY
Status: DISCONTINUED | OUTPATIENT
Start: 2021-06-25 | End: 2021-06-30 | Stop reason: HOSPADM

## 2021-06-25 RX ORDER — XYLITOL/YERBA SANTA
5 AEROSOL, SPRAY WITH PUMP (ML) MUCOUS MEMBRANE 4 TIMES DAILY PRN
Status: DISCONTINUED | OUTPATIENT
Start: 2021-06-25 | End: 2021-06-30 | Stop reason: HOSPADM

## 2021-06-25 RX ORDER — ZINC SULFATE 50(220)MG
220 CAPSULE ORAL DAILY
Status: DISCONTINUED | OUTPATIENT
Start: 2021-06-25 | End: 2021-06-30 | Stop reason: HOSPADM

## 2021-06-25 RX ORDER — DIPHENHYDRAMINE HYDROCHLORIDE, ZINC ACETATE 2; .1 G/100G; G/100G
CREAM TOPICAL 3 TIMES DAILY PRN
Status: DISCONTINUED | OUTPATIENT
Start: 2021-06-25 | End: 2021-06-30 | Stop reason: HOSPADM

## 2021-06-25 RX ORDER — ZINC SULFATE 50(220)MG
220 CAPSULE ORAL DAILY
Status: DISCONTINUED | OUTPATIENT
Start: 2021-06-25 | End: 2021-06-25

## 2021-06-25 RX ADMIN — HALOPERIDOL 5 MG: 5 TABLET ORAL at 08:09

## 2021-06-25 RX ADMIN — ACETAMINOPHEN 650 MG: 325 TABLET, FILM COATED ORAL at 21:47

## 2021-06-25 RX ADMIN — Medication 5 SPRAY: at 14:31

## 2021-06-25 RX ADMIN — IBUPROFEN 400 MG: 400 TABLET ORAL at 19:09

## 2021-06-25 RX ADMIN — ASPIRIN 81 MG: 81 TABLET, COATED ORAL at 08:05

## 2021-06-25 RX ADMIN — HYDROXYZINE HYDROCHLORIDE 50 MG: 50 TABLET, FILM COATED ORAL at 15:45

## 2021-06-25 RX ADMIN — FOLIC ACID 1 MG: 1 TABLET ORAL at 08:05

## 2021-06-25 RX ADMIN — IBUPROFEN 400 MG: 400 TABLET ORAL at 09:59

## 2021-06-25 RX ADMIN — DIPHENHYDRAMINE HYDROCHLORIDE, ZINC ACETATE: 2; .1 CREAM TOPICAL at 12:15

## 2021-06-25 RX ADMIN — MIRTAZAPINE 15 MG: 15 TABLET, FILM COATED ORAL at 21:45

## 2021-06-25 RX ADMIN — ACETAMINOPHEN 650 MG: 325 TABLET, FILM COATED ORAL at 12:07

## 2021-06-25 RX ADMIN — MELATONIN 3 MG: at 21:45

## 2021-06-25 RX ADMIN — B-COMPLEX W/ C & FOLIC ACID TAB 1 TABLET: TAB at 15:44

## 2021-06-25 RX ADMIN — ZINC SULFATE 220 MG (50 MG) CAPSULE 220 MG: CAPSULE at 15:44

## 2021-06-25 RX ADMIN — ACETAMINOPHEN 650 MG: 325 TABLET, FILM COATED ORAL at 06:56

## 2021-06-25 RX ADMIN — CHOLECALCIFEROL TAB 10 MCG (400 UNIT) 400 UNITS: 10 TAB at 09:59

## 2021-06-25 RX ADMIN — HALOPERIDOL 5 MG: 5 TABLET ORAL at 17:17

## 2021-06-25 RX ADMIN — OXYCODONE HYDROCHLORIDE AND ACETAMINOPHEN 250 MG: 500 TABLET ORAL at 08:02

## 2021-06-25 RX ADMIN — Medication 5 SPRAY: at 15:47

## 2021-06-25 RX ADMIN — ATORVASTATIN CALCIUM 10 MG: 10 TABLET, FILM COATED ORAL at 15:44

## 2021-06-25 RX ADMIN — Medication 5 SPRAY: at 22:01

## 2021-06-25 NOTE — NURSING NOTE
Pt received Atarax 50 mg PO at 1545 for moderate anxiety, pt rating anxiety 6/10 and reporting difficulty concentrating  PRN partially effective, pt reports improved anxiety and concentration

## 2021-06-25 NOTE — NURSING NOTE
Pt remains appropriate throughout conversation  Denies SI/HI-verbally contracts for safety  States currently anxiety is low however tends to increase when thinking of stressors outside the hospital  Pt states work as being one of his main stressors and due to his schedule he has been having difficulties sleeping  Pt otherwise denies any concerns  Denies AVH  Pt remains compliant with scheduled medications and denies any questions regarding treatment at this time

## 2021-06-25 NOTE — NURSING NOTE
Pt is visible in the milieu and is pleasant and cooperative in conversation with a flat affect  Pt rates his anxiety 5/10 and depression 6/10 and denies SI/HI/AVH  Pt reports to this writer "I just L[place] Lees go home to see my wife and kids"  Pt also reports being bothered by "backgroud noise" when he goes to the day room  Pt denies having any questions or concerns at the present

## 2021-06-25 NOTE — PLAN OF CARE
Problem: Alteration in Thoughts and Perception  Goal: Treatment Goal: Gain control of psychotic behaviors/thinking, reduce/eliminate presenting symptoms and demonstrate improved reality functioning upon discharge  Outcome: Progressing  Goal: Verbalize thoughts and feelings  Description: Interventions:  - Promote a nonjudgmental and trusting relationship with the patient through active listening and therapeutic communication  - Assess patient's level of functioning, behavior and potential for risk  - Engage patient in 1 on 1 interactions  - Encourage patient to express fears, feelings, frustrations, and discuss symptoms    - [place] patient to reality, help patient recognize reality-based thinking   - Administer medications as ordered and assess for potential side effects  - Provide the patient education related to the signs and symptoms of the illness and desired effects of prescribed medications  Outcome: Progressing  Goal: Refrain from acting on delusional thinking/internal stimuli  Description: Interventions:  - Monitor patient closely, per order   - Utilize least restrictive measures   - Set reasonable limits, give positive feedback for acceptable   - Administer medications as ordered and monitor of potential side effects  Outcome: Progressing     Problem: Depression  Goal: Treatment Goal: Demonstrate behavioral control of depressive symptoms, verbalize feelings of improved mood/affect, and adopt new coping skills prior to discharge  Outcome: Progressing  Goal: Verbalize thoughts and feelings  Description: Interventions:  - Assess and re-assess patient's level of risk   - Engage patient in 1:1 interactions, daily, for a minimum of 15 minutes   - Encourage patient to express feelings, fears, frustrations, hopes   Outcome: Progressing  Goal: Attend and participate in unit activities, including therapeutic, recreational, and educational groups  Description: Interventions:  - Provide therapeutic and educational activities daily, encourage attendance and participation, and document same in the medical record   Outcome: Progressing  Goal: Complete daily ADLs, including personal hygiene independently, as able  Description: Interventions:  - Observe, teach, and assist patient with ADLS  -  Monitor and promote a balance of rest/activity, with adequate nutrition and elimination   Outcome: Progressing     Problem: Anxiety  Goal: Anxiety is at manageable level  Description: Interventions:  - Assess and monitor patient's anxiety level  - Monitor for signs and symptoms (heart palpitations, chest pain, shortness of breath, headaches, nausea, feeling jumpy, restlessness, irritable, apprehensive)  - Collaborate with interdisciplinary team and initiate plan and interventions as ordered    - [place] patient to unit/surroundings  - Explain treatment plan  - Encourage participation in care  - Encourage verbalization of concerns/fears  - Identify coping mechanisms  - Assist in developing anxiety-reducing skills  - Administer/offer alternative therapies  - Limit or eliminate stimulants  Outcome: Progressing

## 2021-06-25 NOTE — PROGRESS NOTES
Progress Note - [address] Celis 36 y o  male MRN: [id number]  Unit/Bed#: Rodena Koyanagi 489-31 Encounter: [id number]    Assessment/Plan   Principal Problem:    Mood disorder ([address] )  Active Problems:    Medical clearance for psychiatric admission    Benign essential HTN      Subjective: Patient was seen, chart reviewed and case discussed with team  As per report patient is less disorganized  Patient appears isolative, withdrawn and less paranoid  He continues to endorse "background sounds and visions" at times  Mood and anxiety are improving  He is compliant with medications, reports dry mouth  Denies SI/HI     Psychiatric Review of Systems:  Behavior over the last 24 hours:  improved  Sleep: normal  Appetite: normal  Medication side effects: No  ROS: headache, all others negative    Current Medications:  Current Facility-Administered Medications   Medication Dose Route Frequency    acetaminophen (TYLENOL) tablet 650 mg  650 mg Oral Q6H PRN    acetaminophen (TYLENOL) tablet 650 mg  650 mg Oral Q4H PRN    acetaminophen (TYLENOL) tablet 975 mg  975 mg Oral Q6H PRN    aluminum-magnesium hydroxide-simethicone (MYLANTA) oral suspension 30 mL  30 mL Oral Q4H PRN    ascorbic acid (VITAMIN C) tablet 250 mg  250 mg Oral Daily    aspirin (ECOTRIN LOW STRENGTH) EC tablet 81 mg  81 mg Oral Daily    atorvastatin (LIPITOR) tablet 10 mg  10 mg Oral Daily With Dinner    benztropine (COGENTIN) tablet 1 mg  1 mg Oral Q4H PRN Max 6/day    chlorproMAZINE (THORAZINE) tablet 50 mg  50 mg Oral Q6H PRN    cholecalciferol (VITAMIN D3) tablet 400 Units  400 Units Oral Daily    hydrOXYzine HCL (ATARAX) tablet 50 mg  50 mg Oral Q6H PRN Max 4/day    Or    diphenhydrAMINE (BENADRYL) injection 50 mg  50 mg Intramuscular Q6H PRN    diphenhydrAMINE (BENADRYL) tablet 25 mg  25 mg Oral Q4H PRN    diphenhydrAMINE-zinc acetate (BENADRYL) 2-0 1 % cream   Topical TID PRN    folic acid (FOLVITE) tablet 1 mg  1 mg Oral Daily    haloperidol (HALDOL) tablet 10 mg  10 mg Oral Q8H PRN    Or    haloperidol lactate (HALDOL) injection 10 mg  10 mg Intramuscular Q8H PRN    haloperidol (HALDOL) tablet 2 mg  2 mg Oral Q6H PRN    haloperidol (HALDOL) tablet 5 mg  5 mg Oral Q6H PRN    haloperidol (HALDOL) tablet 5 mg  5 mg Oral BID    hydrOXYzine HCL (ATARAX) tablet 100 mg  100 mg Oral Q6H PRN Max 4/day    Or    LORazepam (ATIVAN) injection 2 mg  2 mg Intramuscular Q6H PRN    hydrOXYzine HCL (ATARAX) tablet 25 mg  25 mg Oral Q6H PRN Max 4/day    ibuprofen (MOTRIN) tablet 400 mg  400 mg Oral Q6H PRN    LORazepam (ATIVAN) tablet 1 mg  1 mg Oral Q6H PRN    melatonin tablet 3 mg  3 mg Oral HS    mirtazapine (REMERON) tablet 15 mg  15 mg Oral HS    multivitamin stress formula tablet 1 tablet  1 tablet Oral Daily    saliva substitute (MOUTH KOTE) mucosal solution 5 spray  5 spray Mouth/Throat 4x Daily PRN    senna-docusate sodium (SENOKOT S) 8 6-50 mg per tablet 1 tablet  1 tablet Oral Daily PRN    zinc sulfate (ZINCATE) capsule 220 mg  220 mg Oral Daily       Behavioral Health Medications: all current active meds have been reviewed  Vitals:  Vitals:    06/25/21 0737   BP: 142/95   Pulse: 72   Resp: 16   Temp:    SpO2:        Laboratory results:  I have personally reviewed all pertinent laboratory/tests results  Mental Status Evaluation:  Appearance:  age appropriate   Behavior:  guarded   Speech:  normal pitch and normal volume   Mood:  anxious   Affect:  constricted and mood-congruent   Language Appropriate   Thought Process:  goal directed and logical   Thought Content:  paranoia   Perceptual Disturbances:  Auditory hallucinations without commands and Visual hallucinations   Risk Potential: Suicidal Ideations none, Homicidal Ideations none and Potential for Aggression No   Sensorium:  person, place, time/date, situation, day of week, month of year and year   Cognition:  recent and remote memory grossly intact   Consciousness:  alert Attention: attention span appeared shorter than expected for age   Insight:  fair   Judgment: fair   Gait/Station: normal gait/station   Motor Activity: no abnormal movements     Progress Toward Goals: Progressing    Recommended Treatment: Continue with pharmacotherapy, group therapy, milieu therapy and occupational therapy      1  Mouth Cha [place]

## 2021-06-26 PROCEDURE — 99232 SBSQ HOSP IP/OBS MODERATE 35: CPT | Performed by: PSYCHIATRY & NEUROLOGY

## 2021-06-26 RX ADMIN — Medication 5 SPRAY: at 06:57

## 2021-06-26 RX ADMIN — Medication 5 SPRAY: at 12:59

## 2021-06-26 RX ADMIN — B-COMPLEX W/ C & FOLIC ACID TAB 1 TABLET: TAB at 08:02

## 2021-06-26 RX ADMIN — Medication 5 SPRAY: at 19:33

## 2021-06-26 RX ADMIN — FOLIC ACID 1 MG: 1 TABLET ORAL at 08:00

## 2021-06-26 RX ADMIN — OXYCODONE HYDROCHLORIDE AND ACETAMINOPHEN 250 MG: 500 TABLET ORAL at 08:01

## 2021-06-26 RX ADMIN — HALOPERIDOL 5 MG: 5 TABLET ORAL at 18:25

## 2021-06-26 RX ADMIN — DIPHENHYDRAMINE HYDROCHLORIDE, ZINC ACETATE: 2; .1 CREAM TOPICAL at 21:29

## 2021-06-26 RX ADMIN — IBUPROFEN 400 MG: 400 TABLET ORAL at 10:48

## 2021-06-26 RX ADMIN — MELATONIN 3 MG: at 21:28

## 2021-06-26 RX ADMIN — CHOLECALCIFEROL TAB 10 MCG (400 UNIT) 400 UNITS: 10 TAB at 08:01

## 2021-06-26 RX ADMIN — MIRTAZAPINE 15 MG: 15 TABLET, FILM COATED ORAL at 21:28

## 2021-06-26 RX ADMIN — HALOPERIDOL 5 MG: 5 TABLET ORAL at 08:00

## 2021-06-26 RX ADMIN — ZINC SULFATE 220 MG (50 MG) CAPSULE 220 MG: CAPSULE at 08:02

## 2021-06-26 RX ADMIN — ATORVASTATIN CALCIUM 10 MG: 10 TABLET, FILM COATED ORAL at 16:03

## 2021-06-26 RX ADMIN — HYDROXYZINE HYDROCHLORIDE 50 MG: 50 TABLET, FILM COATED ORAL at 16:03

## 2021-06-26 RX ADMIN — Medication 5 SPRAY: at 21:28

## 2021-06-26 RX ADMIN — ASPIRIN 81 MG: 81 TABLET, COATED ORAL at 08:00

## 2021-06-26 RX ADMIN — ACETAMINOPHEN 650 MG: 325 TABLET, FILM COATED ORAL at 06:58

## 2021-06-26 NOTE — TREATMENT TEAM
AM rounds: Calm and cooperative, polite, depressed/flat affect  Denies SI/HI/AVH, no signs of psychosis  Slept
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
## 2021-06-27 PROCEDURE — 99232 SBSQ HOSP IP/OBS MODERATE 35: CPT | Performed by: PSYCHIATRY & NEUROLOGY

## 2021-06-27 RX ADMIN — ACETAMINOPHEN 650 MG: 325 TABLET, FILM COATED ORAL at 10:43

## 2021-06-27 RX ADMIN — OXYCODONE HYDROCHLORIDE AND ACETAMINOPHEN 250 MG: 500 TABLET ORAL at 09:10

## 2021-06-27 RX ADMIN — FOLIC ACID 1 MG: 1 TABLET ORAL at 09:10

## 2021-06-27 RX ADMIN — HALOPERIDOL 5 MG: 5 TABLET ORAL at 09:10

## 2021-06-27 RX ADMIN — MIRTAZAPINE 15 MG: 15 TABLET, FILM COATED ORAL at 21:48

## 2021-06-27 RX ADMIN — Medication 5 SPRAY: at 21:49

## 2021-06-27 RX ADMIN — ZINC SULFATE 220 MG (50 MG) CAPSULE 220 MG: CAPSULE at 09:11

## 2021-06-27 RX ADMIN — MELATONIN 3 MG: at 21:48

## 2021-06-27 RX ADMIN — ATORVASTATIN CALCIUM 10 MG: 10 TABLET, FILM COATED ORAL at 17:01

## 2021-06-27 RX ADMIN — CHOLECALCIFEROL TAB 10 MCG (400 UNIT) 400 UNITS: 10 TAB at 09:10

## 2021-06-27 RX ADMIN — B-COMPLEX W/ C & FOLIC ACID TAB 1 TABLET: TAB at 09:11

## 2021-06-27 RX ADMIN — Medication 5 SPRAY: at 15:51

## 2021-06-27 RX ADMIN — Medication 5 SPRAY: at 09:12

## 2021-06-27 RX ADMIN — HALOPERIDOL 5 MG: 5 TABLET ORAL at 19:17

## 2021-06-27 RX ADMIN — ASPIRIN 81 MG: 81 TABLET, COATED ORAL at 09:09

## 2021-06-27 NOTE — PROGRESS NOTES
Progress Note - Dheeraj Bello Lalita 36 y o  male MRN: @MRN   Unit/Bed#: Cheree Primrose 194-83 Encounter: [id number]    Per nursing report and sign out, patient attending groups, pleasant and cooperative  Engaged, no SI, AVH  Slept  Kaila Zapata reports today that his roommate is very loud, so rough night of sleep  Depression low, anxiety low  No h/a or other issues, slight sore from sleeping on his [facility] bed  No SI or HI, no AVH  Does not feel back to 100%, but thinks not sleeping in his own bed, and being here, are barriers too  Wants to go back to concussion therapy  Energy: low  Sleep: broken sleep  Appetite: normal  Medication side effects: No   ROS: all other systems are negative    Mental Status Evaluation:    Appearance:  age appropriate   Behavior:  pleasant, cooperative, with good eye contact   Speech:  Normal volume, regular rate and rhythm   Mood:  dysphoric, anxious   Affect:  brighter with some improvement, constricted       Thought Process:  Linear and goal directed   Associations: intact associations   Thought Content:  normal and appropriate   Perceptual Disturbances: no auditory or visual hallcunations   Risk Potential: Suicidal ideation - None  Homicidal ideation - None  Potential for aggression - No   Sensorium:  A&Ox3   Cognition:  grossly intact   Consciousness:  Alert & Awake   Memory:  recent and remote memory grossly intact   Attention: attention span and concentration are age appropriate           Insight:  good   Judgment: good   Muscle Strength  Muscle Tone normal  normal   Gait/Station: normal gait/station with good balance   Motor Activity: no abnormal movements       Vital signs in last 24 hours:    Temp:  [98 1 °F (36 7 °C)-98 5 °F (36 9 °C)] 98 1 °F (36 7 °C)  HR:  [77-99] 77  Resp:  [17] 17  BP: (128-141)/(69-97) 128/69    Laboratory results:  I have personally reviewed all pertinent laboratory/tests results      Assessment/Plan   Principal Problem:    Mood disorder [facility])  Active Problems:    Medical clearance for psychiatric admission    Benign essential HTN      Ladell Range is making good progress    Recommended Treatment:     Planned medication and treatment changes: All current active medications have been reviewed    Encourage group therapy, milieu therapy and occupational therapy  Behavioral Health checks as unit standard unless ordered or noted otherwise    Current Facility-Administered Medications   Medication Dose Route Frequency Provider Last Rate    acetaminophen  650 mg Oral Q6H PRN GOOD Kruse-RIP      acetaminophen  650 mg Oral Q4H PRN GOOD Kruse-RIP      acetaminophen  975 mg Oral Q6H PRN Annel Ortiz PA-C      aluminum-magnesium hydroxide-simethicone  30 mL Oral Q4H PRN Annel Ortiz PA-C      ascorbic acid  250 mg Oral Daily Annel Ortiz PA-C      aspirin  81 mg Oral Daily Annel Ortiz PA-C      atorvastatin  10 mg Oral Daily With Isadora Tidwell MD      benztropine  1 mg Oral Q4H PRN Max 6/day Annel Ortiz PA-C      chlorproMAZINE  50 mg Oral Q6H PRN Foster Shahid MD      cholecalciferol  400 Units Oral Daily Foster Shahid MD      hydrOXYzine HCL  50 mg Oral Q6H PRN Max 4/day Annel Ortiz PA-C      Or    diphenhydrAMINE  50 mg Intramuscular Q6H PRN Annel Ortiz PA-C      diphenhydrAMINE  25 mg Oral Q4H PRN Adela Ramirez      diphenhydrAMINE-zinc acetate   Topical TID PRN Foster Shahid MD      folic acid  1 mg Oral Daily Annel Ortiz PA-C      haloperidol  10 mg Oral Q8H PRN Foster Shahid MD      Or    haloperidol lactate  10 mg Intramuscular Q8H PRN Foster Shahid MD      haloperidol  2 mg Oral Q6H PRN Foster Shahid MD      haloperidol  5 mg Oral Q6H PRN Foster Shahid MD      haloperidol  5 mg Oral BID Veronica Shaw Karyna Alberts MD      hydrOXYzine HCL  100 mg Oral Q6H PRN Max 4/day Annel Ortiz PA-C      Or    LORazepam  2 mg Intramuscular Q6H PRN Annel Ortiz PA-C      hydrOXYzine HCL  25 mg Oral Q6H PRN Max 4/day Annel Ortiz PA-C      ibuprofen  400 mg Oral Q6H PRN Johnny Irby MD      LORazepam  1 mg Oral Q6H PRN Johnny Irby MD      melatonin  3 mg Oral HS Annel Ortiz PA-C      mirtazapine  15 mg Oral HS Johnny Irby MD      multivitamin stress formula  1 tablet Oral Daily Johnny Irby MD      saliva substitute  5 spray Mouth/Throat 4x Daily PRN Johnny Irby MD      senna-docusate sodium  1 tablet Oral Daily PRN Annel Ortiz PA-C      zinc sulfate  220 mg Oral Daily Johnny Iryb MD         1) continue current treatment  2) Continue to support patient and engage them in the programs available as feasible and appropriate  Continue case management support and therapy  Continue discharge planning  Risks / Benefits of Treatment:    Risks, benefits, and possible side effects of medications explained to patient and patient verbalizes understanding and agreement for treatment  Counseling / Coordination of Care:    Medication changes reviewed with nursing staff  Medications, treatment progress and treatment plan reviewed with patient        Rebeccatra Fraserveda POLK DO  6/27/2021  7:25 AM

## 2021-06-27 NOTE — PLAN OF CARE
Problem: Alteration in Thoughts and Perception  Goal: Treatment Goal: Gain control of psychotic behaviors/thinking, reduce/eliminate presenting symptoms and demonstrate improved reality functioning upon discharge  Outcome: Progressing  Goal: Verbalize thoughts and feelings  Description: Interventions:  - Promote a nonjudgmental and trusting relationship with the patient through active listening and therapeutic communication  - Assess patient's level of functioning, behavior and potential for risk  - Engage patient in 1 on 1 interactions  - Encourage patient to express fears, feelings, frustrations, and discuss symptoms    - [place] patient to reality, help patient recognize reality-based thinking   - Administer medications as ordered and assess for potential side effects  - Provide the patient education related to the signs and symptoms of the illness and desired effects of prescribed medications  Outcome: Progressing  Goal: Refrain from acting on delusional thinking/internal stimuli  Description: Interventions:  - Monitor patient closely, per order   - Utilize least restrictive measures   - Set reasonable limits, give positive feedback for acceptable   - Administer medications as ordered and monitor of potential side effects  Outcome: Progressing     Problem: Depression  Goal: Treatment Goal: Demonstrate behavioral control of depressive symptoms, verbalize feelings of improved mood/affect, and adopt new coping skills prior to discharge  Outcome: Progressing  Goal: Verbalize thoughts and feelings  Description: Interventions:  - Assess and re-assess patient's level of risk   - Engage patient in 1:1 interactions, daily, for a minimum of 15 minutes   - Encourage patient to express feelings, fears, frustrations, hopes   Outcome: Progressing  Goal: Attend and participate in unit activities, including therapeutic, recreational, and educational groups  Description: Interventions:  - Provide therapeutic and educational activities daily, encourage attendance and participation, and document same in the medical record   Outcome: Progressing  Goal: Complete daily ADLs, including personal hygiene independently, as able  Description: Interventions:  - Observe, teach, and assist patient with ADLS  -  Monitor and promote a balance of rest/activity, with adequate nutrition and elimination   Outcome: Progressing     Problem: Anxiety  Goal: Anxiety is at manageable level  Description: Interventions:  - Assess and monitor patient's anxiety level  - Monitor for signs and symptoms (heart palpitations, chest pain, shortness of breath, headaches, nausea, feeling jumpy, restlessness, irritable, apprehensive)  - Collaborate with interdisciplinary team and initiate plan and interventions as ordered    - [place] patient to unit/surroundings  - Explain treatment plan  - Encourage participation in care  - Encourage verbalization of concerns/fears  - Identify coping mechanisms  - Assist in developing anxiety-reducing skills  - Administer/offer alternative therapies  - Limit or eliminate stimulants  Outcome: Progressing

## 2021-06-27 NOTE — NURSING NOTE
Pt visible in the milieu, attends groups, and observed watching tv with peers in the dayroom, social with staff and peers  Calm, cooperative, pleasant during interaction  Reports feeling improved mood since admission  States not having any paranoid thoughts at this time  He was excited about a drawing he colored earlier showing this writer the techniques he used with various colors  Pt is looking forward to being back with his wife and is hopeful to take a drive with her to visit family hopeful he can tolerate the ride  He has been compliant taking medications  Pt requested and received a list of his medications  No thoughts to harm self or others  No hallucinations

## 2021-06-27 NOTE — PLAN OF CARE
Problem: Alteration in Thoughts and Perception  Goal: Treatment Goal: Gain control of psychotic behaviors/thinking, reduce/eliminate presenting symptoms and demonstrate improved reality functioning upon discharge  Outcome: Progressing  Goal: Verbalize thoughts and feelings  Description: Interventions:  - Promote a nonjudgmental and trusting relationship with the patient through active listening and therapeutic communication  - Assess patient's level of functioning, behavior and potential for risk  - Engage patient in 1 on 1 interactions  - Encourage patient to express fears, feelings, frustrations, and discuss symptoms    - [place] patient to reality, help patient recognize reality-based thinking   - Administer medications as ordered and assess for potential side effects  - Provide the patient education related to the signs and symptoms of the illness and desired effects of prescribed medications  Outcome: Progressing  Goal: Refrain from acting on delusional thinking/internal stimuli  Description: Interventions:  - Monitor patient closely, per order   - Utilize least restrictive measures   - Set reasonable limits, give positive feedback for acceptable   - Administer medications as ordered and monitor of potential side effects  Outcome: Progressing     Problem: Depression  Goal: Treatment Goal: Demonstrate behavioral control of depressive symptoms, verbalize feelings of improved mood/affect, and adopt new coping skills prior to discharge  Outcome: Progressing  Goal: Verbalize thoughts and feelings  Description: Interventions:  - Assess and re-assess patient's level of risk   - Engage patient in 1:1 interactions, daily, for a minimum of 15 minutes   - Encourage patient to express feelings, fears, frustrations, hopes   Outcome: Progressing  Goal: Attend and participate in unit activities, including therapeutic, recreational, and educational groups  Description: Interventions:  - Provide therapeutic and educational activities daily, encourage attendance and participation, and document same in the medical record   Outcome: Progressing  Goal: Complete daily ADLs, including personal hygiene independently, as able  Description: Interventions:  - Observe, teach, and assist patient with ADLS  -  Monitor and promote a balance of rest/activity, with adequate nutrition and elimination   Outcome: Progressing     Problem: Anxiety  Goal: Anxiety is at manageable level  Description: Interventions:  - Assess and monitor patient's anxiety level  - Monitor for signs and symptoms (heart palpitations, chest pain, shortness of breath, headaches, nausea, feeling jumpy, restlessness, irritable, apprehensive)  - Collaborate with interdisciplinary team and initiate plan and interventions as ordered    - [place] patient to unit/surroundings  - Explain treatment plan  - Encourage participation in care  - Encourage verbalization of concerns/fears  - Identify coping mechanisms  - Assist in developing anxiety-reducing skills  - Administer/offer alternative therapies  - Limit or eliminate stimulants  Outcome: Progressing

## 2021-06-28 PROBLEM — Z00.8 MEDICAL CLEARANCE FOR PSYCHIATRIC ADMISSION: Status: RESOLVED | Noted: 2021-06-22 | Resolved: 2021-06-28

## 2021-06-28 PROCEDURE — 99232 SBSQ HOSP IP/OBS MODERATE 35: CPT | Performed by: STUDENT IN AN ORGANIZED HEALTH CARE EDUCATION/TRAINING PROGRAM

## 2021-06-28 RX ORDER — POLYETHYLENE GLYCOL 3350 17 G/17G
17 POWDER, FOR SOLUTION ORAL DAILY PRN
Status: DISCONTINUED | OUTPATIENT
Start: 2021-06-28 | End: 2021-06-30 | Stop reason: HOSPADM

## 2021-06-28 RX ADMIN — HALOPERIDOL 5 MG: 5 TABLET ORAL at 17:22

## 2021-06-28 RX ADMIN — Medication 5 SPRAY: at 06:30

## 2021-06-28 RX ADMIN — B-COMPLEX W/ C & FOLIC ACID TAB 1 TABLET: TAB at 08:09

## 2021-06-28 RX ADMIN — FOLIC ACID 1 MG: 1 TABLET ORAL at 08:08

## 2021-06-28 RX ADMIN — MELATONIN 3 MG: at 21:02

## 2021-06-28 RX ADMIN — CHOLECALCIFEROL TAB 10 MCG (400 UNIT) 400 UNITS: 10 TAB at 08:08

## 2021-06-28 RX ADMIN — HALOPERIDOL 5 MG: 5 TABLET ORAL at 08:08

## 2021-06-28 RX ADMIN — ACETAMINOPHEN 650 MG: 325 TABLET, FILM COATED ORAL at 15:45

## 2021-06-28 RX ADMIN — Medication 5 SPRAY: at 15:46

## 2021-06-28 RX ADMIN — DOCUSATE SODIUM AND SENNOSIDES 1 TABLET: 8.6; 5 TABLET ORAL at 09:53

## 2021-06-28 RX ADMIN — MIRTAZAPINE 15 MG: 15 TABLET, FILM COATED ORAL at 21:02

## 2021-06-28 RX ADMIN — ZINC SULFATE 220 MG (50 MG) CAPSULE 220 MG: CAPSULE at 08:09

## 2021-06-28 RX ADMIN — OXYCODONE HYDROCHLORIDE AND ACETAMINOPHEN 250 MG: 500 TABLET ORAL at 08:08

## 2021-06-28 RX ADMIN — ASPIRIN 81 MG: 81 TABLET, COATED ORAL at 08:08

## 2021-06-28 RX ADMIN — ATORVASTATIN CALCIUM 10 MG: 10 TABLET, FILM COATED ORAL at 15:46

## 2021-06-28 NOTE — PROGRESS NOTES
06/28/21 1030   Activity/Group Checklist   Group Other (Comment)  (Group Art Therapy; Open Choice/Intro, Process Discussion)   Attendance Attended   Attendance Duration (min) 46-60   Interactions Interacted appropriately  (Social with select group members)   Affect/Mood Appropriate   Goals Achieved Able to listen to others; Able to engage in interactions; Able to recieve feedback; Able to self-disclose; Able to manage/cope with feelings  (Engaged with materials;  Full participation)

## 2021-06-28 NOTE — NURSING NOTE
Pt visible in the milieu, social with peers and staff  Pleasant, calm, polite during interaction  Compliant taking medications and attends groups  No paranoia or delusions noted  No thoughts of self harm  Sleeping and eating well  No questions or concerns at this time

## 2021-06-28 NOTE — PROGRESS NOTES
Pt mood has improved, pt is more clear  Denies delusions and denies hallucinations  Has been attending groups   Social      DC: This week (?)      06/28/21 0803   Team Meeting   Meeting Type Daily Rounds   Team Members Present   Team Members Present Physician;Nurse;;Occupational Therapist   Physician Team Member Dr Kenn Carrillo / Jazlyn Nash / Tiff Duncan Team Member Shanelle Orellnaa / Em Conrad Management Team Member Louisa Bautista / Tu Estrada / Martha Ordonez   OT Team Member Madelyn Tello / Art Therapy Student   Patient/Family Present   Patient Present No   Patient's Family Present No

## 2021-06-28 NOTE — PLAN OF CARE
Problem: Alteration in Thoughts and Perception  Goal: Verbalize thoughts and feelings  Description: Interventions:  - Promote a nonjudgmental and trusting relationship with the patient through active listening and therapeutic communication  - Assess patient's level of functioning, behavior and potential for risk  - Engage patient in 1 on 1 interactions  - Encourage patient to express fears, feelings, frustrations, and discuss symptoms    - [place] patient to reality, help patient recognize reality-based thinking   - Administer medications as ordered and assess for potential side effects  - Provide the patient education related to the signs and symptoms of the illness and desired effects of prescribed medications  Outcome: Progressing     Problem: Depression  Goal: Treatment Goal: Demonstrate behavioral control of depressive symptoms, verbalize feelings of improved mood/affect, and adopt new coping skills prior to discharge  Outcome: Progressing     Problem: Depression  Goal: Verbalize thoughts and feelings  Description: Interventions:  - Assess and re-assess patient's level of risk   - Engage patient in 1:1 interactions, daily, for a minimum of 15 minutes   - Encourage patient to express feelings, fears, frustrations, hopes   Outcome: Progressing

## 2021-06-28 NOTE — NURSING NOTE
Alert and oriented  Playing cards with peers, pleasant and cooperative, stated he had poor day due to room mate snoring
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
## 2021-06-28 NOTE — PROGRESS NOTES
Progress Note - [address] 36 y o  male MRN: [id number]  Unit/Bed#: [facility] 309-34 Encounter: [id number]    Assessment/Plan   Principal Problem:    Mood disorder ([address] )  Active Problems:    Medical clearance for psychiatric admission    Benign essential HTN      Subjective: Patient was seen, chart reviewed and case discussed with team  As per report no behavioral issues on the unit  Patient appears calm and cooperative  Reports that his voices have improved  No longer sees any visions  Sleep is good  He is visible in groups and activities  Debnies SI/HI/AH/VH  Reports that he misses his family, hopeful for discharge     Psychiatric Review of Systems:  Behavior over the last 24 hours:  improved  Sleep: normal  Appetite: normal  Medication side effects: No  ROS: no complaints, all others negative    Current Medications:  Current Facility-Administered Medications   Medication Dose Route Frequency    acetaminophen (TYLENOL) tablet 650 mg  650 mg Oral Q6H PRN    acetaminophen (TYLENOL) tablet 650 mg  650 mg Oral Q4H PRN    acetaminophen (TYLENOL) tablet 975 mg  975 mg Oral Q6H PRN    aluminum-magnesium hydroxide-simethicone (MYLANTA) oral suspension 30 mL  30 mL Oral Q4H PRN    ascorbic acid (VITAMIN C) tablet 250 mg  250 mg Oral Daily    aspirin (ECOTRIN LOW STRENGTH) EC tablet 81 mg  81 mg Oral Daily    atorvastatin (LIPITOR) tablet 10 mg  10 mg Oral Daily With Dinner    benztropine (COGENTIN) tablet 1 mg  1 mg Oral Q4H PRN Max 6/day    chlorproMAZINE (THORAZINE) tablet 50 mg  50 mg Oral Q6H PRN    cholecalciferol (VITAMIN D3) tablet 400 Units  400 Units Oral Daily    hydrOXYzine HCL (ATARAX) tablet 50 mg  50 mg Oral Q6H PRN Max 4/day    Or    diphenhydrAMINE (BENADRYL) injection 50 mg  50 mg Intramuscular Q6H PRN    diphenhydrAMINE (BENADRYL) tablet 25 mg  25 mg Oral Q4H PRN    diphenhydrAMINE-zinc acetate (BENADRYL) 2-0 1 % cream   Topical TID PRN    folic acid (FOLVITE) tablet 1 mg 1 mg Oral Daily    haloperidol (HALDOL) tablet 10 mg  10 mg Oral Q8H PRN    Or    haloperidol lactate (HALDOL) injection 10 mg  10 mg Intramuscular Q8H PRN    haloperidol (HALDOL) tablet 2 mg  2 mg Oral Q6H PRN    haloperidol (HALDOL) tablet 5 mg  5 mg Oral Q6H PRN    haloperidol (HALDOL) tablet 5 mg  5 mg Oral BID    hydrOXYzine HCL (ATARAX) tablet 100 mg  100 mg Oral Q6H PRN Max 4/day    Or    LORazepam (ATIVAN) injection 2 mg  2 mg Intramuscular Q6H PRN    hydrOXYzine HCL (ATARAX) tablet 25 mg  25 mg Oral Q6H PRN Max 4/day    ibuprofen (MOTRIN) tablet 400 mg  400 mg Oral Q6H PRN    LORazepam (ATIVAN) tablet 1 mg  1 mg Oral Q6H PRN    melatonin tablet 3 mg  3 mg Oral HS    mirtazapine (REMERON) tablet 15 mg  15 mg Oral HS    multivitamin stress formula tablet 1 tablet  1 tablet Oral Daily    polyethylene glycol (MIRALAX) packet 17 g  17 g Oral Daily PRN    saliva substitute (MOUTH KOTE) mucosal solution 5 spray  5 spray Mouth/Throat 4x Daily PRN    senna-docusate sodium (SENOKOT S) 8 6-50 mg per tablet 1 tablet  1 tablet Oral Daily PRN    zinc sulfate (ZINCATE) capsule 220 mg  220 mg Oral Daily       Behavioral Health Medications: all current active meds have been reviewed  Vitals:  Vitals:    06/28/21 0859   BP: 150/90   Pulse: 104   Resp:    Temp:    SpO2:        Laboratory results:  I have personally reviewed all pertinent laboratory/tests results      Mental Status Evaluation:  Appearance:  age appropriate   Behavior:  normal   Speech:  normal pitch and normal volume   Mood:  anxious   Affect:  mood-congruent   Language Appropriate   Thought Process:  goal directed and logical   Thought Content:  normal   Perceptual Disturbances: None   Risk Potential: Suicidal Ideations none, Homicidal Ideations none and Potential for Aggression No   Sensorium:  person, place, time/date, situation, day of week, month of year and year   Cognition:  recent and remote memory grossly intact   Consciousness: alert    Attention: attention span appeared shorter than expected for age   Insight:  fair   Judgment: age appropriate   Gait/Station: normal gait/station   Motor Activity: no abnormal movements     Progress Toward Goals: Progressing    Recommended Treatment: Continue with pharmacotherapy, group therapy, milieu therapy and occupational therapy

## 2021-06-28 NOTE — PROGRESS NOTES
Pt displayed paranoia in the AM but none in the PM  Pt was more clear at bed time  Slept through the night with CPAP machine       DC: Next week         06/25/21 8037   Team Meeting   Meeting Type Daily Rounds   Team Members Present   Team Members Present Physician;Nurse;;Occupational Therapist   Physician Team Member Dr Esther Doherty / Ida Osorio / Danielle  Team Member Jason Velasquez / Pj Silverio Management Team Member Jesús Duncan / Kiersten Lester   OT Team Member Alaina   Patient/Family Present   Patient Present No   Patient's Family Present No

## 2021-06-28 NOTE — NURSING NOTE
Patient pleasant, calm and cooperative  Social with select peers and staff  Inquiring about discharge date, as he reports missing his wife and children  Writer validated pt progress and continued improvement, encouraging him to discuss further with treatment team upon meeting today  Pt did report difficulty moving his bowels, requested medication to support this and approx  2 hours following PRN Senokot being given he informed writer of a successful BM  Pt denies SI, HI, AVH, no longer appears preoccupied or paranoid and is alert, oriented and compliant with treatment

## 2021-06-28 NOTE — CASE MANAGEMENT
CM sent follow up in-basket message to [facility] letting them know that team is looking at discharge for pt for Wednesday 6/30/2021  CM asked if they have an opening at their [facility] location for in person on Thursday  CM will follow up once a response is provided

## 2021-06-28 NOTE — NURSING NOTE
Pt irritable about roommate being in the room all the time  Pt reported "He snores all the time and I can't nap  I don't want to be in that room  It's weird having someone watch you as you sleep " Pt reported "I'm being discharged Wednesday  Could it be moved up " Pt reported "I will sign myself out " RN explained 72 hours notice and pt didn't request to sign  Pt appeared to visible calm down during interaction  Pt expressed readiness for discharge  Pt reported "I messed up my medications and had to come in and they should put people who are very ill with people who are not " Pt reported "I want to leave tomorrow " Pt reported talking with the doctor in the morning  Pt attending evening group presently
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
## 2021-06-29 PROCEDURE — 99232 SBSQ HOSP IP/OBS MODERATE 35: CPT | Performed by: PHYSICIAN ASSISTANT

## 2021-06-29 RX ORDER — FOLIC ACID 1 MG/1
1 TABLET ORAL DAILY
Qty: 30 TABLET | Refills: 1 | Status: SHIPPED | OUTPATIENT
Start: 2021-06-29 | End: 2021-08-28

## 2021-06-29 RX ORDER — ASCORBIC ACID 250 MG
250 TABLET ORAL DAILY
Qty: 30 TABLET | Refills: 1 | Status: SHIPPED | OUTPATIENT
Start: 2021-06-29 | End: 2021-08-28

## 2021-06-29 RX ORDER — OMEGA-3S/DHA/EPA/FISH OIL/D3 300MG-1000
400 CAPSULE ORAL DAILY
Qty: 30 TABLET | Refills: 1 | Status: SHIPPED | OUTPATIENT
Start: 2021-06-29 | End: 2021-08-28

## 2021-06-29 RX ORDER — LANOLIN ALCOHOL/MO/W.PET/CERES
3 CREAM (GRAM) TOPICAL
Qty: 30 TABLET | Refills: 1 | Status: SHIPPED | OUTPATIENT
Start: 2021-06-29 | End: 2021-08-28

## 2021-06-29 RX ORDER — ASPIRIN 81 MG/1
81 TABLET ORAL DAILY
Qty: 30 TABLET | Refills: 1 | Status: SHIPPED | OUTPATIENT
Start: 2021-06-29 | End: 2021-08-28

## 2021-06-29 RX ORDER — ZINC SULFATE 50(220)MG
220 CAPSULE ORAL DAILY
Qty: 30 CAPSULE | Refills: 1 | Status: SHIPPED | OUTPATIENT
Start: 2021-06-29 | End: 2021-07-29

## 2021-06-29 RX ORDER — HYDROXYZINE HYDROCHLORIDE 25 MG/1
25 TABLET, FILM COATED ORAL EVERY 6 HOURS PRN
Qty: 40 TABLET | Refills: 0 | Status: SHIPPED | OUTPATIENT
Start: 2021-06-29 | End: 2021-07-19 | Stop reason: SDUPTHER

## 2021-06-29 RX ORDER — MIRTAZAPINE 15 MG/1
15 TABLET, FILM COATED ORAL
Qty: 30 TABLET | Refills: 1 | Status: SHIPPED | OUTPATIENT
Start: 2021-06-29 | End: 2021-07-12 | Stop reason: ALTCHOICE

## 2021-06-29 RX ORDER — TRAZODONE HYDROCHLORIDE 50 MG/1
50 TABLET ORAL
Status: DISCONTINUED | OUTPATIENT
Start: 2021-06-29 | End: 2021-06-30 | Stop reason: HOSPADM

## 2021-06-29 RX ORDER — HALOPERIDOL 5 MG
5 TABLET ORAL 2 TIMES DAILY
Qty: 60 TABLET | Refills: 1 | Status: SHIPPED | OUTPATIENT
Start: 2021-06-29 | End: 2021-08-28

## 2021-06-29 RX ORDER — ATORVASTATIN CALCIUM 10 MG/1
10 TABLET, FILM COATED ORAL
Qty: 30 TABLET | Refills: 1 | Status: SHIPPED | OUTPATIENT
Start: 2021-06-29 | End: 2021-08-28

## 2021-06-29 RX ADMIN — CHOLECALCIFEROL TAB 10 MCG (400 UNIT) 400 UNITS: 10 TAB at 08:07

## 2021-06-29 RX ADMIN — Medication 5 SPRAY: at 18:31

## 2021-06-29 RX ADMIN — ATORVASTATIN CALCIUM 10 MG: 10 TABLET, FILM COATED ORAL at 16:26

## 2021-06-29 RX ADMIN — ZINC SULFATE 220 MG (50 MG) CAPSULE 220 MG: CAPSULE at 08:08

## 2021-06-29 RX ADMIN — ACETAMINOPHEN 650 MG: 325 TABLET, FILM COATED ORAL at 18:29

## 2021-06-29 RX ADMIN — OXYCODONE HYDROCHLORIDE AND ACETAMINOPHEN 250 MG: 500 TABLET ORAL at 08:07

## 2021-06-29 RX ADMIN — HALOPERIDOL 5 MG: 5 TABLET ORAL at 17:00

## 2021-06-29 RX ADMIN — HALOPERIDOL 5 MG: 5 TABLET ORAL at 08:06

## 2021-06-29 RX ADMIN — TRAZODONE HYDROCHLORIDE 50 MG: 50 TABLET ORAL at 21:52

## 2021-06-29 RX ADMIN — FOLIC ACID 1 MG: 1 TABLET ORAL at 08:07

## 2021-06-29 RX ADMIN — B-COMPLEX W/ C & FOLIC ACID TAB 1 TABLET: TAB at 08:08

## 2021-06-29 RX ADMIN — MELATONIN 3 MG: at 21:52

## 2021-06-29 RX ADMIN — BENZTROPINE MESYLATE 1 MG: 1 TABLET ORAL at 23:47

## 2021-06-29 RX ADMIN — MIRTAZAPINE 15 MG: 15 TABLET, FILM COATED ORAL at 21:52

## 2021-06-29 RX ADMIN — ASPIRIN 81 MG: 81 TABLET, COATED ORAL at 08:07

## 2021-06-29 NOTE — CASE MANAGEMENT
CM received response from [facility] and pt can begin on VIRTUAL at [facility] on Friday 7/2/2021 and will meet with Psychiatrist at [address] [place] [address] and  at [place] [address]  They will call pt before to explain how to log in and what to expect  CM added information to pt AVS and will provide pt and wife with an update

## 2021-06-29 NOTE — CASE MANAGEMENT
CM spoke to pt wife Ivette Rodriguez ([phone]) to provide an update on pt and that team has confirmed dc for Wednesday 6/30/2021  Wife informed CM that pt has called her about 3 times so far this morning asking her to pick him up today  CM informed her that pt had difficulty sleeping last night due to roommate  CM informed wife that pt has been accepted to [facility] ([facility] at [facility] location to begin on Friday 7/2/21  CM informed her that [facility] location is offering IN PERSON but CM would need to see if they could schedule pt in the next week  CM asked wife if she felt that would work for pt  Wife requested that CM speak to pt about the options and to see which he prefers  Wife asked that if pt preferred IN PERSON at [facility], if pt would be able to drive on his own as she has their children and could not transport him everyday as [facility] is about 30 mins away from their home  CM will discuss options with pt and call wife back this afternoon with plan  Wife also asked if pt could be scheduled for OP at [facility]  CM will have pt sign PEPE and see if they could schedule him for an intake       Wife reported that she will be able to pick pt up tomorrow for dc after 11:30am

## 2021-06-29 NOTE — BH TRANSITION RECORD
Contact Information: If you have any questions, concerns, pended studies, tests and/or procedures, or emergencies regarding your inpatient behavioral health visit  Please contact [place] [facility] [facility] unit [phone] and ask to speak to a , nurse or physician  A contact is available 24 hours/ 7 days a week at this number  Summary of Procedures Performed During your Stay:  Below is a list of major procedures performed during your hospital stay and a summary of results:  - Cardiac Procedures/Studies: EKG  - Major Imaging Studies: CT head wo contrast, CT cervical spine wo contrast, CXR, CT abd/pelvs w contrast     Pending Studies (From admission, onward)    None        If studies are pending at discharge, follow up with your PCP and/or referring provider

## 2021-06-29 NOTE — PROGRESS NOTES
Pt pleasant, calm and social  Pt irritable about his roommate       DC: Wednesday - pt may request to dc sooner due to roommate      06/29/21 0813   Team Meeting   Meeting Type Daily Rounds   Team Members Present   Team Members Present Physician;Nurse;Occupational Therapist   Physician Team Member Dr Karyna Alberts / Hever Campos / Kelby Najjar Team Member Daljit Rosario / Josephine Alexandra Management Team Member Mckay Raphael / Joao Jang / Gabriel Veras   OT Team Member Alaina   Patient/Family Present   Patient Present No   Patient's Family Present No

## 2021-06-29 NOTE — NURSING NOTE
Pt has been pleasant,calm and cooperative,visible on unit  Individual denies SI,HI,AVH, med and meal compliant and reports some anxiety that is currently manageable due to task he has to take care of at home  Will follow up as needed

## 2021-06-29 NOTE — CASE MANAGEMENT
CM called pt wife to provide an update on pt and that he is set for dc tomorrow 6/30/2021  Wife reported that she spoke to pt and he would like her to come earlier than she had planned  Wife reported that she was able to get someone else to take her children to swim lessons so she could come earlier  She reported she can get here around 11am      CM informed wife that pt has been scheduled for an intake appointment at [facility] in Nursery on 8/5/2021 at 9:30 with therapist and then 10:30 with Psychiatrist      CM informed wife to look at dc paperwork and that will show her the appointment dates and times and they will be getting a call from partial to review expectations and directions on how to log in  CM will continue to follow and support pt for dc planning as needed

## 2021-06-29 NOTE — NURSING NOTE
Has been sleeping in bed throughout the night, without periods of restlessness observed  CPAP on, intact & functioning throughout the night  Will continue to monitor

## 2021-06-29 NOTE — PLAN OF CARE
Problem: Alteration in Thoughts and Perception  Goal: Treatment Goal: Gain control of psychotic behaviors/thinking, reduce/eliminate presenting symptoms and demonstrate improved reality functioning upon discharge  Outcome: Progressing  Goal: Verbalize thoughts and feelings  Description: Interventions:  - Promote a nonjudgmental and trusting relationship with the patient through active listening and therapeutic communication  - Assess patient's level of functioning, behavior and potential for risk  - Engage patient in 1 on 1 interactions  - Encourage patient to express fears, feelings, frustrations, and discuss symptoms    - [place] patient to reality, help patient recognize reality-based thinking   - Administer medications as ordered and assess for potential side effects  - Provide the patient education related to the signs and symptoms of the illness and desired effects of prescribed medications  Outcome: Progressing  Goal: Refrain from acting on delusional thinking/internal stimuli  Description: Interventions:  - Monitor patient closely, per order   - Utilize least restrictive measures   - Set reasonable limits, give positive feedback for acceptable   - Administer medications as ordered and monitor of potential side effects  Outcome: Progressing     Problem: Depression  Goal: Treatment Goal: Demonstrate behavioral control of depressive symptoms, verbalize feelings of improved mood/affect, and adopt new coping skills prior to discharge  Outcome: Progressing  Goal: Verbalize thoughts and feelings  Description: Interventions:  - Assess and re-assess patient's level of risk   - Engage patient in 1:1 interactions, daily, for a minimum of 15 minutes   - Encourage patient to express feelings, fears, frustrations, hopes   Outcome: Progressing  Goal: Attend and participate in unit activities, including therapeutic, recreational, and educational groups  Description: Interventions:  - Provide therapeutic and educational activities daily, encourage attendance and participation, and document same in the medical record   Outcome: Progressing  Goal: Complete daily ADLs, including personal hygiene independently, as able  Description: Interventions:  - Observe, teach, and assist patient with ADLS  -  Monitor and promote a balance of rest/activity, with adequate nutrition and elimination   Outcome: Progressing     Problem: Anxiety  Goal: Anxiety is at manageable level  Description: Interventions:  - Assess and monitor patient's anxiety level  - Monitor for signs and symptoms (heart palpitations, chest pain, shortness of breath, headaches, nausea, feeling jumpy, restlessness, irritable, apprehensive)  - Collaborate with interdisciplinary team and initiate plan and interventions as ordered    - [place] patient to unit/surroundings  - Explain treatment plan  - Encourage participation in care  - Encourage verbalization of concerns/fears  - Identify coping mechanisms  - Assist in developing anxiety-reducing skills  - Administer/offer alternative therapies  - Limit or eliminate stimulants  Outcome: Progressing     Problem: SAFETY ADULT  Goal: Patient will remain free of falls  Description: INTERVENTIONS:  - Educate patient/family on patient safety including physical limitations  - Instruct patient to call for assistance with activity   Outcome: Progressing

## 2021-06-29 NOTE — PROGRESS NOTES
Progress Note - Dheeraj Bello Lalita 36 y o  male MRN: [id number]   Unit/Bed#: [facility] 284-11 Encounter: [id number]    Behavior over the last 24 hours: improving  Carlie Keen Is a 40-year-old male with a history of psychosis presents for psychiatric follow-up  Patient is pleasant, calm and cooperative upon approach  Staff reports good behavior over the past several days without any episodes of agitation or outbursts  He has been bright and visible in the milieu  He feels he is improved relative to when he was 1st hospitalized, and he specifically endorses a clearer thinking process  He does note some continued difficulty sleeping, which he attributes to some environmental disturbances and a particularly noisy roommate  Denies any SI or HI  Optimistic and forward thinking for the future  Denies any AH or VH      Sleep: insomnia, decreased  Appetite: normal  Medication side effects: No   ROS: all other systems are negative    Mental Status Evaluation:    Appearance:  age appropriate, casually dressed, adequate grooming   Behavior:  pleasant, cooperative, calm   Speech:  normal rate and volume, fluent, clear   Mood:  euthymic   Affect:  constricted   Thought Process:  organized, goal directed, linear   Associations: intact associations   Thought Content:  no overt delusions   Perceptual Disturbances: no auditory hallucinations, no visual hallucinations   Risk Potential: Suicidal ideation - None at present  Homicidal ideation - None at present  Potential for aggression - No   Sensorium:  oriented to person, place and time/date   Memory:  recent and remote memory grossly intact   Consciousness:  alert and awake   Attention/Concentration: attention span and concentration are age appropriate   Insight:  improving   Judgment: improving   Gait/Station: normal gait/station, normal balance   Motor Activity: no abnormal movements     Vital signs in last 24 hours:    Temp:  [98 7 °F (37 1 °C)-98 8 °F (37 1 °C)] 98 8 °F (37 1 °C)  HR:  [97] 97  Resp:  [18] 18  BP: (138-146)/(90-98) 138/90    Laboratory results: I have personally reviewed all pertinent laboratory/tests results    Most Recent Labs:   Lab Results   Component Value Date    WBC 9 16 06/22/2021    RBC 5 05 06/22/2021    HGB 15 5 06/22/2021    HCT 46 1 06/22/2021     06/22/2021    RDW 12 6 06/22/2021    NEUTROABS 6 28 06/22/2021    SODIUM 140 06/22/2021    K 4 0 06/22/2021     06/22/2021    CO2 28 06/22/2021    BUN 9 06/22/2021    CREATININE 0 99 06/22/2021    GLUC 118 06/22/2021    CALCIUM 9 5 06/22/2021    AST 16 06/22/2021    ALT 25 06/22/2021    ALKPHOS 88 06/22/2021    TP 8 0 06/22/2021    ALB 4 2 06/22/2021    TBILI 0 70 06/22/2021    [id number] 1 040 06/18/2021    HGBA1C 5 4 01/01/2021     01/01/2021       Progress Toward Goals: improving, reality testing is improving, no longer psychotic, working on coping skills, discharge planning    Assessment/Plan   Principal Problem:    Mood disorder ([facility] [address] )  Active Problems:    Benign essential HTN      Recommended Treatment:     Planned medication and treatment changes:     All current active medications have been reviewed  Encourage group therapy, milieu therapy and occupational therapy  Behavioral Health checks every 7 minutes  Continue current medications:    Current Facility-Administered Medications   Medication Dose Route Frequency Provider Last Rate    acetaminophen  650 mg Oral Q6H PRN Annel Ortiz PA-C      acetaminophen  650 mg Oral Q4H PRN Annel Ortiz PA-C      acetaminophen  975 mg Oral Q6H PRN Annel Ortiz PA-C      aluminum-magnesium hydroxide-simethicone  30 mL Oral Q4H PRN Annel Ortiz PA-C      ascorbic acid  250 mg Oral Daily Annel Ortiz PA-C      aspirin  81 mg Oral Daily Annel Ortiz PA-C      atorvastatin  10 mg Oral Daily With Gale Tidwell MD      benztropine  1 mg Oral Q4H PRN Max 6/day Dicie Ora SUNDAR Ortiz      chlorproMAZINE  50 mg Oral Q6H PRN Ayan Ta MD      cholecalciferol  400 Units Oral Daily Ayan Ta MD      hydrOXYzine HCL  50 mg Oral Q6H PRN Max 4/day Annel Ortiz PA-C      Or    diphenhydrAMINE  50 mg Intramuscular Q6H PRN Annel Ortiz PA-C      diphenhydrAMINE  25 mg Oral Q4H PRN Eliga Dangelo      diphenhydrAMINE-zinc acetate   Topical TID PRN Ayan Ta MD      folic acid  1 mg Oral Daily Annel Ortiz PA-C      haloperidol  10 mg Oral Q8H PRN Ayan Ta MD      Or    haloperidol lactate  10 mg Intramuscular Q8H PRN Ayan Ta MD      haloperidol  2 mg Oral Q6H PRN Ayan Ta MD      haloperidol  5 mg Oral Q6H PRN Ayan Ta MD      haloperidol  5 mg Oral BID Ayan Ta MD      hydrOXYzine HCL  100 mg Oral Q6H PRN Max 4/day Annel Ortiz PA-C      Or    LORazepam  2 mg Intramuscular Q6H PRN Annel Ortiz PA-C      hydrOXYzine HCL  25 mg Oral Q6H PRN Max 4/day Annel Ortiz PA-C      ibuprofen  400 mg Oral Q6H PRN Ayan Ta MD      LORazepam  1 mg Oral Q6H PRN Ayan Ta MD      melatonin  3 mg Oral HS Annel Otriz PA-C      mirtazapine  15 mg Oral HS Ayan Ta MD      multivitamin stress formula  1 tablet Oral Daily Ayan Ta MD      polyethylene glycol  17 g Oral Daily PRN Brianne Shabazz PA-C      saliva substitute  5 spray Mouth/Throat 4x Daily PRN Ayan Ta MD      senna-docusate sodium  1 tablet Oral Daily PRN Annel Ortiz PA-C      traZODone  50 mg Oral HS PRN Brianne Shabazz PA-C      zinc sulfate  220 mg Oral Daily Ayan Ta MD       Risks / Benefits of Treatment:    Risks, benefits, and possible side effects of medications explained to patient and patient verbalizes understanding and agreement for treatment  Counseling / Coordination of Care:    Patient's progress discussed with staff in treatment team meeting  Medications, treatment progress and treatment plan reviewed with patient      Mary Busch PA-C 06/29/21

## 2021-06-29 NOTE — DISCHARGE SUMMARY
and benadryl for agitation  Later he was found on the floor, responding to commands and complaining of dizziness and headaches  Vitals are stable /69, will transfer to ER for complete medical and neurological evaluation "       Social History     Tobacco History     Smoking Status  Former Smoker    Smokeless Tobacco Use  Never Used          Alcohol History     Alcohol Use Status  Not Currently          Drug Use     Drug Use Status  Never          Sexual Activity     Sexually Active  Not Asked          Activities of Daily Living    Not Asked               Additional Substance Use Detail     Questions Responses    Problems Due to Past Use of Alcohol? No    Problems Due to Past Use of Substances? No    Substance Use Assessment Denies substance use within the past 12 months    Alcohol Use Frequency Experimented    Heroin Frequency Denies use in past 12 months    Cocaine frequency Never used    Comment: Never used on 6/21/2021     Crack Cocaine Frequency Denies use in past 12 months    Methamphetamine Frequency Denies use in past 12 months    Narcotic Frequency Denies use in past 12 months    Benzodiazepine Frequency Denies use in past 12 months    Amphetamine frequency Denies use in past 12 months    Barbituate Frequency Denies use use in past 12 months    Inhalant frequency Never used    Comment: Never used on 6/21/2021     Hallucinogen frequency Never used    Comment: Never used on 6/21/2021     Ecstasy frequency Never used    Comment: Never used on 6/21/2021     Other drug frequency Never used    Comment: Never used on 6/21/2021     Opiate frequency Denies use in past 12 months    Last reviewed by Mary Ann Gutierrez RN on 6/21/2021          Past Medical History:   Diagnosis Date    Anxiety     Hypertension      Past Surgical History:   Procedure Laterality Date    CORNEAL TRANSPLANT      EYE SURGERY      TONSILLECTOMY         Medications: All current active medications have been reviewed    Medications prior to admission:    Prior to Admission Medications   Prescriptions Last Dose Informant Patient Reported? Taking? Ascorbic Acid, Vitamin C, (VITAMIN C) 100 MG tablet   Yes No   Sig: Take 100 mg by mouth daily   DULoxetine (CYMBALTA) 60 mg delayed release capsule   Yes No   Sig: Take 60 mg by mouth daily   Folic Acid-Cholecalciferol 1-5000 MG-UNIT TABS   Yes No   Sig: Take by mouth daily   acetaminophen (TYLENOL) 500 mg tablet   Yes No   Sig: Take 500 mg by mouth every 6 (six) hours as needed   Patient not taking: Reported on 6/21/2021   aspirin 81 mg chewable tablet   Yes No   Sig: Chew 81 mg daily   atorvastatin (LIPITOR) 40 mg tablet   Yes No   Sig: Take 40 mg by mouth daily   hydrOXYzine HCL (ATARAX) 25 mg tablet   Yes No   Sig: Take 25 mg by mouth every 6 (six) hours as needed   polyethylene glycol (GLYCOLAX) 17 GM/SCOOP powder   Yes No   Sig: Take 17 g by mouth daily      Facility-Administered Medications: None       Allergies: Allergies   Allergen Reactions    Adhesive [Medical Tape] Rash       Objective     Vital signs in last 24 hours:    Temp:  [99 1 °F (37 3 °C)-99 2 °F (37 3 °C)] 99 1 °F (37 3 °C)  HR:  [105] 105  Resp:  [18] 18  BP: (143-148)/(90-96) 143/90    No intake or output data in the 24 hours ending 06/30/21 [address] [place] [address] Course:     Librado Butler was admitted to the inpatient psychiatric unit and started on Behavioral Health checks every 7 minutes  During the hospitalization he was encouraged to attend individual therapy, group therapy, milieu therapy and occupational therapy  Psychiatric medications were adjusted over the hospital stay  To address aggresive behavior, agitation, psychotic symptoms, paranoid ideation, delusional thoughts and disorganized behavior, Librado Butler was treated with antidepressant Remeron and antipsychotic medication Haldol  Medication doses were started instead during the hospital course  Remeron was added at the dose of 15mg qhs   Haldol was added at the dose of 5mg BID  Prior to beginning of treatment medications risks and benefits and possible side effects including risk of parkinsonian symptoms, Tardive Dyskinesia and metabolic syndrome related to treatment with antipsychotic medications and risk of suicidality and serotonin syndrome related to treatment with antidepressants were reviewed with Ronda Angeles  He verbalized understanding and agreement for treatment  Upon admission Ronda Angeles was seen by medical service for medical clearance for inpatient treatment and medical follow up  Ronda Angeles was also seen in the ED as mentioned in the initial H&P after he fell and possibly struck his head during the fall; patient was examined by the ED provider and underwent a battery of diagnostic imaging, all of which came back unremarkable, and the patient was safely transferred back to the [facility] without further issues       Efrains symptoms slowly improved over the hospital course  Initially after admission he was still feeling psychotic, confused and agitated at times  With adjustment of medications and therapeutic milieu his symptoms gradually resolved  At the end of treatment Ronda Angeles was doing much better  His mood was significantly improved at the time of discharge  Ronda Angeles denied suicidal ideation, intent or plan at the time of discharge and denied homicidal ideation, intent or plan at the time of discharge  There was no overt psychosis at the time of discharge  Delusional thoughts were no longer present  Paranoid ideation was resolved  Ronda Angeles was participating appropriately in milieu at the time of discharge  Behavior was appropriate on the unit at the time of discharge  Sleep and appetite were improved  Ronda Angeles was tolerating medications and was not reporting any significant side effects at the time of discharge  Since Ronda Angeles was doing well at the end of the hospitalization, treatment team felt that Ronda Angeles could be safely discharged to outpatient care      The outpatient follow up with [facility] [address] [facility] [address], family physician Dr Jose Rodney and a psychiatrist at [facility] was arranged by the unit  upon discharge  Mental Status at Time of Discharge:     Appearance:  age appropriate, casually dressed, adequate grooming, R eye enucleated   Behavior:  cooperative, calm   Speech:  normal rate and volume   Mood:  euthymic   Affect:  normal range and intensity   Thought Process:  organized, linear   Associations: intact associations   Thought Content:  no overt delusions   Perceptual Disturbances: no auditory hallucinations, no visual hallucinations   Risk Potential: Suicidal ideation - None at present  Homicidal ideation - None at present  Potential for aggression - No   Sensorium:  oriented to person, place and time/date   Memory:  recent and remote memory grossly intact   Consciousness:  alert and awake   Attention/Concentration: attention span and concentration are age appropriate   Insight:  improved   Judgment: improved   Gait/Station: normal gait/station   Motor Activity: no abnormal movements       Admission Diagnosis:    Principal Problem:    Mood disorder ([facility] [address] )  Active Problems:    Benign essential HTN      Discharge Diagnosis:     Principal Problem:    Mood disorder ([facility] [address] )  Active Problems:    Benign essential HTN  Resolved Problems:    Medical clearance for psychiatric admission      Lab Results:   I have personally reviewed all pertinent laboratory/tests results    Most Recent Labs:   Lab Results   Component Value Date    WBC 9 16 06/22/2021    RBC 5 05 06/22/2021    HGB 15 5 06/22/2021    HCT 46 1 06/22/2021     06/22/2021    RDW 12 6 06/22/2021    NEUTROABS 6 28 06/22/2021    SODIUM 140 06/22/2021    K 4 0 06/22/2021     06/22/2021    CO2 28 06/22/2021    BUN 9 06/22/2021    CREATININE 0 99 06/22/2021    GLUC 118 06/22/2021    CALCIUM 9 5 06/22/2021    AST 16 06/22/2021    ALT 25 06/22/2021    ALKPHOS 88 06/22/2021    TP 8 0 06/22/2021    ALB 4 2 06/22/2021    TBILI 0 70 06/22/2021    [id number] 1 040 06/18/2021    HGBA1C 5 4 01/01/2021     01/01/2021       Discharge Medications:    See after visit summary for all reconciled discharge medications provided to patient and family  Discharge instructions/Information to patient and family:     See after visit summary for information provided to patient and family  Provisions for Follow-Up Care:    See after visit summary for information related to follow-up care and any pertinent home health orders  Discharge Statement:    I spent 40 minutes discharging the patient  This time was spent on the day of discharge  I had direct contact with the patient on the day of discharge  Additional documentation is required if more than 30 minutes were spent on discharge:    I reviewed with Tamela Marte importance of compliance with medications and outpatient treatment after discharge  I discussed the medication regimen and possible side effects of the medications with Tamela Marte prior to discharge  At the time of discharge he was tolerating psychiatric medications  I discussed outpatient follow up with Tamela Marte  I reviewed with Tamela Marte crisis plan and safety plan upon discharge      Discharge on Two Antipsychotic Medications: Silvia Busch PA-C 06/30/21

## 2021-06-29 NOTE — CASE MANAGEMENT
CM met with pt to check in about referrals and dc plan for tomorrow 6/30/2021  Pt reports improvement and feeling ready to dc home to his wife and kids  CM informed him that he has been accepted to begin VIRTUAL Partial on Friday 7/2/2021 at [facility]  CM informed pt that his wife wanted CM to ask which he prefers, in person or Virtual  CM informed pt that only [facility] is offering In person partial at this time and that he would need to drive or be transported their daily and he would be there for most of the day  Pt thought about it for a little while and then determined that he would prefer the VIRTUAL partial that he can do in his home  Pt also reported "I would like to get back into concussion therapy " CM informed him that wife will be scheduling that for him  CM spoke to pt about his wife wondering if he is open to a referral to [facility] in [facility]  Pt was in agreement with that and signed PEPE  Pt reported that he spoke to his wife and she will be able to pick him up tomorrow in the morning  CM will follow up with wife and pt with any appointment dates and times prior to dc

## 2021-06-29 NOTE — NURSING NOTE
Pt is visible in the milieu and is pleasant and social with staffing and peers  Pt is pleasant and cooperative in conversation with a bright affect  Pt denies both anxiety and depression as well as SI/HI/AVH  Pt denies having any questions or concerns at the present

## 2021-06-30 VITALS
HEART RATE: 105 BPM | TEMPERATURE: 99.1 F | RESPIRATION RATE: 18 BRPM | WEIGHT: 206.57 LBS | SYSTOLIC BLOOD PRESSURE: 143 MMHG | BODY MASS INDEX: 30.6 KG/M2 | HEIGHT: 69 IN | OXYGEN SATURATION: 99 % | DIASTOLIC BLOOD PRESSURE: 90 MMHG

## 2021-06-30 PROCEDURE — 99239 HOSP IP/OBS DSCHRG MGMT >30: CPT | Performed by: PHYSICIAN ASSISTANT

## 2021-06-30 RX ORDER — BENZTROPINE MESYLATE 1 MG/1
1 TABLET ORAL
Qty: 30 TABLET | Refills: 1 | Status: SHIPPED | OUTPATIENT
Start: 2021-06-30 | End: 2021-08-29

## 2021-06-30 RX ADMIN — OXYCODONE HYDROCHLORIDE AND ACETAMINOPHEN 250 MG: 500 TABLET ORAL at 08:18

## 2021-06-30 RX ADMIN — FOLIC ACID 1 MG: 1 TABLET ORAL at 08:18

## 2021-06-30 RX ADMIN — Medication 5 SPRAY: at 08:29

## 2021-06-30 RX ADMIN — HALOPERIDOL 5 MG: 5 TABLET ORAL at 08:18

## 2021-06-30 RX ADMIN — CHOLECALCIFEROL TAB 10 MCG (400 UNIT) 400 UNITS: 10 TAB at 08:18

## 2021-06-30 RX ADMIN — ZINC SULFATE 220 MG (50 MG) CAPSULE 220 MG: CAPSULE at 08:19

## 2021-06-30 RX ADMIN — B-COMPLEX W/ C & FOLIC ACID TAB 1 TABLET: TAB at 08:19

## 2021-06-30 RX ADMIN — POLYETHYLENE GLYCOL 3350 17 G: 17 POWDER, FOR SOLUTION ORAL at 08:29

## 2021-06-30 RX ADMIN — ASPIRIN 81 MG: 81 TABLET, COATED ORAL at 08:18

## 2021-06-30 NOTE — PROGRESS NOTES
Pt denies SI  Denies anxiety  Reported restless legs  PRN: Tylenol, Cogentin, Mouth Kote, Trazodone     DC:  Today - wife will pick pt up around 11am      06/30/21 0802   Team Meeting   Meeting Type Daily Rounds   Team Members Present   Team Members Present Physician;Nurse;;Occupational Therapist   Physician Team Member Dr Darrell Larose / Kayden Britton Team Member [phone]  [facility] Management Team Member Laure Pruitt / [id number]   OT Team Member Lakisha Vazquez / Art Therapy Student   Patient/Family Present   Patient Present No   Patient's Family Present No

## 2021-06-30 NOTE — NURSING NOTE
Pleasant and cooperative  Expressed readiness for discharge  Reports wife is a support  Mild anxiety but manageable and excited to see family  Denies SI/HI  No paranoia or bizarre behavior  Encouraged compliance with medications and OP appointments  Discussed healthy coping skills and is looking forward to relaxing at the pool  No questions or concerns

## 2021-06-30 NOTE — PLAN OF CARE
Problem: Alteration in Thoughts and Perception  Goal: Treatment Goal: Gain control of psychotic behaviors/thinking, reduce/eliminate presenting symptoms and demonstrate improved reality functioning upon discharge  Outcome: Adequate for Discharge  Goal: Verbalize thoughts and feelings  Description: Interventions:  - Promote a nonjudgmental and trusting relationship with the patient through active listening and therapeutic communication  - Assess patient's level of functioning, behavior and potential for risk  - Engage patient in 1 on 1 interactions  - Encourage patient to express fears, feelings, frustrations, and discuss symptoms    - Beccaria patient to reality, help patient recognize reality-based thinking   - Administer medications as ordered and assess for potential side effects  - Provide the patient education related to the signs and symptoms of the illness and desired effects of prescribed medications  Outcome: Adequate for Discharge  Goal: Refrain from acting on delusional thinking/internal stimuli  Description: Interventions:  - Monitor patient closely, per order   - Utilize least restrictive measures   - Set reasonable limits, give positive feedback for acceptable   - Administer medications as ordered and monitor of potential side effects  Outcome: Adequate for Discharge     Problem: Depression  Goal: Treatment Goal: Demonstrate behavioral control of depressive symptoms, verbalize feelings of improved mood/affect, and adopt new coping skills prior to discharge  Outcome: Adequate for Discharge  Goal: Verbalize thoughts and feelings  Description: Interventions:  - Assess and re-assess patient's level of risk   - Engage patient in 1:1 interactions, daily, for a minimum of 15 minutes   - Encourage patient to express feelings, fears, frustrations, hopes   Outcome: Adequate for Discharge  Goal: Attend and participate in unit activities, including therapeutic, recreational, and educational groups  Description: Interventions:  - Provide therapeutic and educational activities daily, encourage attendance and participation, and document same in the medical record   Outcome: Adequate for Discharge  Goal: Complete daily ADLs, including personal hygiene independently, as able  Description: Interventions:  - Observe, teach, and assist patient with ADLS  -  Monitor and promote a balance of rest/activity, with adequate nutrition and elimination   Outcome: Adequate for Discharge     Problem: Anxiety  Goal: Anxiety is at manageable level  Description: Interventions:  - Assess and monitor patient's anxiety level  - Monitor for signs and symptoms (heart palpitations, chest pain, shortness of breath, headaches, nausea, feeling jumpy, restlessness, irritable, apprehensive)  - Collaborate with interdisciplinary team and initiate plan and interventions as ordered    - Lizemores patient to unit/surroundings  - Explain treatment plan  - Encourage participation in care  - Encourage verbalization of concerns/fears  - Identify coping mechanisms  - Assist in developing anxiety-reducing skills  - Administer/offer alternative therapies  - Limit or eliminate stimulants  Outcome: Adequate for Discharge     Problem: Ineffective Coping  Goal: Participates in unit activities  Description: Interventions:  - Provide therapeutic environment   - Provide required programming   - Redirect inappropriate behaviors   Outcome: Adequate for Discharge     Problem: DISCHARGE PLANNING  Goal: Discharge to home or other facility with appropriate resources  Description: INTERVENTIONS:  - Identify barriers to discharge w/patient and caregiver  - Arrange for needed discharge resources and transportation as appropriate  - Identify discharge learning needs (meds, wound care, etc )  - Arrange for interpretive services to assist at discharge as needed  - Refer to Case Management Department for coordinating discharge planning if the patient needs post-hospital services based on physician/advanced practitioner order or complex needs related to functional status, cognitive ability, or social support system  Outcome: Adequate for Discharge     Problem: SAFETY ADULT  Goal: Patient will remain free of falls  Description: INTERVENTIONS:  - Educate patient/family on patient safety including physical limitations  - Instruct patient to call for assistance with activity   - Consult OT/PT to assist with strengthening/mobility   - Keep Call bell within reach  - Keep bed low and locked with side rails adjusted as appropriate  - Keep care items and personal belongings within reach  - Initiate and maintain comfort rounds  - Make Fall Risk Sign visible to staff  - Offer Toileting every Hours, in advance of need  - Initiate/Maintain alarm  - Obtain necessary fall risk management equipment:   - Apply yellow socks and bracelet for high fall risk patients  - Consider moving patient to room near nurses station  Outcome: Adequate for Discharge     Expressed readiness for discharge

## 2021-07-02 ENCOUNTER — OFFICE VISIT (OUTPATIENT)
Dept: PSYCHIATRY | Facility: CLINIC | Age: 41
End: 2021-07-02
Payer: COMMERCIAL

## 2021-07-02 ENCOUNTER — OFFICE VISIT (OUTPATIENT)
Dept: PSYCHOLOGY | Facility: CLINIC | Age: 41
End: 2021-07-02
Payer: COMMERCIAL

## 2021-07-02 DIAGNOSIS — F19.959 SUBSTANCE-INDUCED PSYCHOTIC DISORDER (HCC): Primary | ICD-10-CM

## 2021-07-02 DIAGNOSIS — F19.959 SUBSTANCE-INDUCED PSYCHOTIC DISORDER (HCC): ICD-10-CM

## 2021-07-02 DIAGNOSIS — F41.9 ANXIETY: ICD-10-CM

## 2021-07-02 DIAGNOSIS — F39 MOOD DISORDER (HCC): Primary | ICD-10-CM

## 2021-07-02 PROBLEM — R41.3 IMPAIRED MEMORY: Status: ACTIVE | Noted: 2021-01-01

## 2021-07-02 PROBLEM — E78.5 HYPERLIPIDEMIA: Status: ACTIVE | Noted: 2021-01-01

## 2021-07-02 PROBLEM — Z86.73 CHRONIC ARTERIAL ISCHEMIC STROKE: Status: ACTIVE | Noted: 2021-01-15

## 2021-07-02 PROBLEM — H54.40 BLIND RIGHT EYE: Status: ACTIVE | Noted: 2021-01-01
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
## 2021-07-02 PROBLEM — G47.33 MODERATE OBSTRUCTIVE SLEEP APNEA: Status: ACTIVE | Noted: 2019-04-16

## 2021-07-02 PROBLEM — R55 SYNCOPE: Status: ACTIVE | Noted: 2020-12-31

## 2021-07-02 PROBLEM — S06.0X0A CONCUSSION WITH NO LOSS OF CONSCIOUSNESS: Status: ACTIVE | Noted: 2018-02-16

## 2021-07-02 PROBLEM — I69.30 CHRONIC ARTERIAL ISCHEMIC STROKE: Status: ACTIVE | Noted: 2021-01-15

## 2021-07-02 PROBLEM — Z87.820 H/O CONCUSSION: Status: ACTIVE | Noted: 2020-12-15

## 2021-07-02 PROCEDURE — 90792 PSYCH DIAG EVAL W/MED SRVCS: CPT | Performed by: PSYCHIATRY & NEUROLOGY

## 2021-07-02 RX ORDER — ZINC GLUCONATE 50 MG
50 TABLET ORAL DAILY
COMMUNITY

## 2021-07-02 RX ORDER — POLYETHYLENE GLYCOL 3350 17 G/17G
17 POWDER, FOR SOLUTION ORAL DAILY
COMMUNITY
Start: 2021-06-15

## 2021-07-02 NOTE — NURSING NOTE
Patient's Wife Carlo Guerrero called today and was upset by the conversation held with staff the evening when patient was admitted  She expressed concern because staff were abrupt and not nice in speaking with her when she had  asked information to be disclosed about her (PEPE was not signed by patient at that time)  This manager listened to her concern and stated she would address the situation  Patient's wife was satisfied with our conversation and process for resolution

## 2021-07-02 NOTE — PSYCH
Subjective:     Patient ID: Hank Veliz is a 36 y o  male  [facility] Clinical Progress Notes      Specialized Services Documentation  Therapist must complete separate progress note for each specific clinical activity in which the individual participated during the day  Other: ([id number]) This initial case management evaluation was facilitated virtually in a private office using HIPAA Compliant and Approved Microsoft Teams  Hank Veliz consented to the use of tele-video modality of treatment  Reviewed program and given program and [facility] crisis phone numbers  Due to COVID-19 and session being held virtually, Hank Veliz verbally consented to releases and health care coordination form  PCP notified of admission and health care coordination form completed  Completed initial psycho-social evaluation and initial treatment goals discussed  Case Management Note    WESLEY Beyer    Current suicide risk : Low     Hank Veliz denies SI, plan or intent    Medications changes/added/denied? Yes     Treatment session number: 0 - intake only    Individual Case Management Visit provided today?  Yes     [facility] follow up physician's orders: n/a

## 2021-07-02 NOTE — PSYCH
Virtual Regular Visit      Assessment/Plan:    Problem List Items Addressed This Visit        Other    Mood disorder ([address] ) - Primary          PHP VIRTUAL GROUP DUE TO COVID-19       Encounter provider [facility] PARTIAL PSYCH PROGRAM    Provider located at [address] [place] [address] [address]  [address] [facility] [id number]      Recent Visits  No visits were found meeting these conditions  Showing recent visits within past 7 days and meeting all other requirements  Future Appointments  No visits were found meeting these conditions  Showing future appointments within next 150 days and meeting all other requirements       The patient was identified by name and date of birth  Casey Garcia was informed that this is a telemedicine visit and that the visit is being conducted through [employer] and patient was informed that this is a secure, HIPAA-compliant platform  He agrees to proceed     My office door was closed  No one else was in the room  He acknowledged consent and understanding of privacy and security of the video platform  The patient has agreed to participate and understands they can discontinue the visit at any time  Patient is aware this is a billable service  Subjective  Casey Garcia is a 36 y o  male          HPI     Past Medical History:   Diagnosis Date    Anxiety     Hypertension        Past Surgical History:   Procedure Laterality Date    CORNEAL TRANSPLANT      EYE SURGERY      TONSILLECTOMY         Current Outpatient Medications   Medication Sig Dispense Refill    ascorbic acid (VITAMIN C) 250 MG tablet Take 1 tablet (250 mg total) by mouth daily 30 tablet 1    aspirin (ECOTRIN LOW STRENGTH) 81 mg EC tablet Take 1 tablet (81 mg total) by mouth daily 30 tablet 1    atorvastatin (LIPITOR) 10 mg tablet Take 1 tablet (10 mg total) by mouth daily with dinner 30 tablet 1    benztropine (COGENTIN) 1 mg tablet Take 1 tablet (1 mg total) by mouth daily at bedtime 30 tablet 1    cholecalciferol (VITAMIN D3) 400 units tablet Take 1 tablet (400 Units total) by mouth daily 30 tablet 1    folic acid (FOLVITE) 1 mg tablet Take 1 tablet (1 mg total) by mouth daily 30 tablet 1    haloperidol (HALDOL) 5 mg tablet Take 1 tablet (5 mg total) by mouth 2 (two) times a day 60 tablet 1    hydrOXYzine HCL (ATARAX) 25 mg tablet Take 1 tablet (25 mg total) by mouth every 6 (six) hours as needed for anxiety (mild anxiety) for up to 10 days 40 tablet 0    melatonin 3 mg Take 1 tablet (3 mg total) by mouth daily at bedtime 30 tablet 1    mirtazapine (REMERON) 15 mg tablet Take 1 tablet (15 mg total) by mouth daily at bedtime 30 tablet 1    polyethylene glycol (MIRALAX) 17 g packet Take 17 g by mouth daily      Zinc 50 MG TABS Take 50 mg by mouth daily      zinc sulfate (ZINCATE) 220 mg capsule Take 1 capsule (220 mg total) by mouth daily 30 capsule 1     No current facility-administered medications for this visit  Allergies   Allergen Reactions    Prednisone Delirium     Patient is not allergic but had had prednisone induced psychosis/treva    Adhesive [Medical Tape] Rash       Review of Systems    Video Exam    There were no vitals filed for this visit  Physical Exam     I spent 60 minutes with patient today in which greater than 50% of the time was spent in counseling/coordination of care regarding see evaluation      VIRTUAL VISIT DISCLAIMER    Poli Piper acknowledges that he has consented to an online visit or consultation  He understands that the online visit is based solely on information provided by him, and that, in the absence of a face-to-face physical evaluation by the physician, the diagnosis he receives is both limited and provisional in terms of accuracy and completeness  This is not intended to replace a full medical face-to-face evaluation by the physician   Poli Piper understands and accepts these terms
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
## 2021-07-02 NOTE — PSYCH
Assessment/Plan:     Encounter Diagnoses   Name Primary?  Mood disorder (HCC) Yes    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HCC)           Subjective:     Patient ID: Kinjal Walker is a 36 y o  male  [facility] Treatment Plan   AREAS OF NEED: Mood instability as evidenced by; anger outbursts, excessive crying, shame, guilt and difficulty sleeping  Date Initiated: 07/02/2021    Strengths: Supportive family     LONG TERM GOAL:   Date Initiated: 07/02/2021  1 0 I will decrease my mood instability symptoms from baseline 7/10 to 3/10 by implementing emotional regulation skills learned in program  Target Date: 08/04/2021  Completion Date:         SHORT TERM OBJECTIVES:      Date Initiated: 07/02/2021  1 1 I will learn and implement three emotional regulation skills into my weekly routine to improve stability in moods  Revision Date:   Target Date: 07/14/2021  Completion Date:      Date Initiated: 07/02/2021  1 2 I will identify two self-compassion skills to implement into my weekly routine to assist in my feelings of shame and blame and improve my overall outlook on myself  Revision Date:   Target Date: 07/14/2021  Completion Date:      Date Initiated: 07/02/2021  1 3 I will take medications as prescribed and share questions and concerns if arise  Revision Date:  Target Date: 07/14/2021  Completion Date:       Date Initiated: 07/02/2021  1 4 I will identify 3 ways my supports can assist in my recovery and agree to staff/support contact as indicated     Revision Date:  Target Date: 07/14/2021  Completion Date:             7 DAY REVISION:     Date Initiated:  Revision Date:   Target Date:   Completion Date:     Date Initiated:  Revision Date:       Target Date:      Completion Date:            PSYCHIATRY:  Date Initiated: 07/02/2021     Medication management and education          Revision Date:           The person(s) responsible for carrying out this plan is Dr Umu Blount MD             NURSING/SYMPTOM EDUCATION:       Date Initiated: 07/02/2021          1 1, 1 2  1 3, 1 4 This RN/Or Therapist will provide wellness/symptoms and skill education groups three to        five days weekly to educate Yoshi Castrejon on signs and symptoms of diagnoses, skills to manage, and      medication questions that will be addressed by the treatment team             Revision date: The person(s) responsible for carrying out the plan is BHAKTI Baxter        PSYCHOLOGY:   Date Initiated: 07/02/2021     1 1,1 2,1 4 Provide psychotherapy group five times per week to allow opportunity for Yoshi Castrejon to explore stressors and ways of coping         Revision Date:          The person(s) responsible for carrying out the plan is Chris Shields MA and Tiara Barnhart        ALLIED THERAPY:   Date Initiated: 07/02/2021     1 1,1 2 Engage  in AT group five times weekly to encourage development and use of wellness tools to decrease symptoms and promote recovery through meaningful activity  The person(s) responsible for carrying out the plan is SARAH Newsome        CASE MANAGEMENT:   Date Initiated: 07/02/2021         1 0 Engage in individual case management 3-4 times weekly, to discuss and prepare for aftercare         services, discuss progress and other community resources  UR contact as necessary  The person(s) responsible for carrying out the plan is BHAKTI Baxter     Revision Date:               TREATMENT REVIEW/COMMENTS:      DISCHARGE CRITERIA: Identify 3 signs of progress and complete relapse prevention plan  DISCHARGE PLAN: Admit to [facility]  Group therapy, case management, medication management, UR and family contact as indicated  ELOS 10 treatment days  Refer to OP psychiatry and therapy        Estimated Length of Stay: 10 treatment days              Diagnosis and Treatment Plan explained to Carlee Barnett relates understanding diagnosis and is agreeable to Treatment Plan  CLIENT COMMENTS / Please share your thoughts, feelings, need and/or experiences regarding your treatment plan: _____________________________________________________________________________________________________________________________________________________________________________________________________________________________________________________________________________________________________________________ Date/Time: ______________      Patient Signature: _________________________________      Date/Time: ______________       Signature: _________________________________          Date/Time: ______________

## 2021-07-06 ENCOUNTER — APPOINTMENT (OUTPATIENT)
Dept: PSYCHOLOGY | Facility: CLINIC | Age: 41
End: 2021-07-06
Payer: COMMERCIAL

## 2021-07-07 ENCOUNTER — OFFICE VISIT (OUTPATIENT)
Dept: PSYCHOLOGY | Facility: CLINIC | Age: 41
End: 2021-07-07
Payer: COMMERCIAL

## 2021-07-07 DIAGNOSIS — F19.959 SUBSTANCE-INDUCED PSYCHOTIC DISORDER (HCC): Primary | ICD-10-CM

## 2021-07-07 PROCEDURE — S0201 PARTIAL HOSPITALIZATION SERV: HCPCS

## 2021-07-07 PROCEDURE — G0177 OPPS/PHP; TRAIN & EDUC SERV: HCPCS

## 2021-07-07 PROCEDURE — G0176 OPPS/PHP;ACTIVITY THERAPY: HCPCS

## 2021-07-07 PROCEDURE — G0410 GRP PSYCH PARTIAL HOSP 45-50: HCPCS

## 2021-07-07 NOTE — PSYCH
Virtual Regular Visit      Assessment/Plan:    Problem List Items Addressed This Visit        Other    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[address] ) - Primary          Goals addressed in session: Goal 2          Reason for visit is No chief complaint on file  Encounter provider [address] PARTIAL PSYCH PROGRAM    Provider located at [address] [place] [address]   [address] [facility] [id number]      Recent Visits  Date Type Provider Dept   07/02/21 Office Visit [address] PARTIAL PSYCH GROUP THERAPY [facility]   Showing recent visits within past 7 days and meeting all other requirements  Today's Visits  Date Type Provider Dept   07/07/21 Office Visit [address] PARTIAL PSYCH GROUP THERAPY [facility]   Showing today's visits and meeting all other requirements  Future Appointments  No visits were found meeting these conditions  Showing future appointments within next 150 days and meeting all other requirements       The patient was identified by name and date of birth  Poli Piper was informed that this is a telemedicine visit and that the visit is being conducted through [email or web address] and patient was informed that this is a secure, HIPAA-compliant platform  He agrees to proceed     My office door was closed  No one else was in the room  He acknowledged consent and understanding of privacy and security of the video platform  The patient has agreed to participate and understands they can discontinue the visit at any time  Patient is aware this is a billable service  Subjective  Poli Piper is a 36 y o  male         HPI     Past Medical History:   Diagnosis Date    Anxiety     Head injury     Hypertension        Past Surgical History:   Procedure Laterality Date    CORNEAL TRANSPLANT      EYE SURGERY      TONSILLECTOMY         Current Outpatient Medications   Medication Sig Dispense Refill    ascorbic acid (VITAMIN C) 250 MG tablet Take 1 tablet (250 mg total) by mouth daily 30 tablet 1    aspirin (ECOTRIN LOW STRENGTH) 81 mg EC tablet Take 1 tablet (81 mg total) by mouth daily 30 tablet 1    atorvastatin (LIPITOR) 10 mg tablet Take 1 tablet (10 mg total) by mouth daily with dinner 30 tablet 1    benztropine (COGENTIN) 1 mg tablet Take 1 tablet (1 mg total) by mouth daily at bedtime 30 tablet 1    cholecalciferol (VITAMIN D3) 400 units tablet Take 1 tablet (400 Units total) by mouth daily 30 tablet 1    folic acid (FOLVITE) 1 mg tablet Take 1 tablet (1 mg total) by mouth daily 30 tablet 1    haloperidol (HALDOL) 5 mg tablet Take 1 tablet (5 mg total) by mouth 2 (two) times a day 60 tablet 1    hydrOXYzine HCL (ATARAX) 25 mg tablet Take 1 tablet (25 mg total) by mouth every 6 (six) hours as needed for anxiety (mild anxiety) for up to 10 days 40 tablet 0    melatonin 3 mg Take 1 tablet (3 mg total) by mouth daily at bedtime 30 tablet 1    mirtazapine (REMERON) 15 mg tablet Take 1 tablet (15 mg total) by mouth daily at bedtime 30 tablet 1    polyethylene glycol (MIRALAX) 17 g packet Take 17 g by mouth daily      Zinc 50 MG TABS Take 50 mg by mouth daily      zinc sulfate (ZINCATE) 220 mg capsule Take 1 capsule (220 mg total) by mouth daily 30 capsule 1     No current facility-administered medications for this visit  Allergies   Allergen Reactions    Prednisone Delirium     Patient is not allergic but had had prednisone induced psychosis/treva    Adhesive [Medical Tape] Rash       Review of Systems    Video Exam    There were no vitals filed for this visit  Physical Exam     I spent FOUR GROUP HOURS PLUS CASE MANAGEMENT minutes with patient today in which greater than 50% of the time was spent in counseling/coordination of care regarding PHP - SEE NOTES  VIRTUAL VISIT DISCLAIMER    Jaime Majano acknowledges that he has consented to an online visit or consultation   He understands that the online visit is based solely on information provided by him, and that, in the absence of a face-to-face physical evaluation by the physician, the diagnosis he receives is both limited and provisional in terms of accuracy and completeness  This is not intended to replace a full medical face-to-face evaluation by the physician  April Corral understands and accepts these terms

## 2021-07-07 NOTE — PSYCH
Subjective:     Patient ID: Suyapa Meza is a 36 y o  male  [facility] Clinical Progress Notes      Specialized Services Documentation  Therapist must complete separate progress note for each specific clinical activity in which the individual participated during the day  GROUP PSYCHOTHERAPY ([id number]) Group was facilitated virtually in a private office using HIPAA Compliant and Approved [employer] Teams  Suyapa Meza consented to the use of tele-video modality of treatment and was virtually present for group psychotherapy today  Group members received a safe and non-judgmental space to discuss current stressors and received feedback from the group  Members are able to gain a different perspective and deeper understanding of past experiences and current events  Group discussions and themes involved personal disclosures, navigating difficult relationships, coping skills to face challenges and different ways journaling has been utilized  Members were educated on the concept of the [employer] and how journaling and life experiences can bring different areas to light  Lata Rendon was quiet, distracted and did not disclose during open discussion  Lata Rendon demonstrates limited insight and growth through minimal verbal participation and offering support, encouragement and feedback to other group members during the open discussion time  Continue with psychotherapy     TX Plan Objectives: 1 1, 1 2, 1 4   Therapist: Ayesha Fraser MA, [place]

## 2021-07-07 NOTE — PSYCH
Subjective:     Patient ID: Boris Camp is a 36 y o  male  [facility] Clinical Progress Notes      Specialized Services Documentation  Therapist must complete separate progress note for each specific clinical activity in which the individual participated during the day  This was not shared due to this is a psychotherapy note  GROUP PSYCHOTHERAPY ([id number]) Group was facilitated virtually in a private office using HIPAA Compliant and Approved [employer] Teams  Feng Alvarez consented to the use of tele-video modality of treatment and was virtually present for group psychotherapy today  The group engaged in education about the stages of change  Each member shared a personal example of a habit they struggle with and then stated which stage of change they are currently in  Members identified themselves in one of the following categories: pre-contemplation, contemplation, preparation, action, and maintenance  Feng Alvarez seemed fairly engaged throughout the group session  Feng Alvarez stated that the habit they are currently struggling with is drinking alcohol  They identified with the maintenance stage  Feng Alvarez is encouraged to make progress towards goals and objectives through group participation and will continue to attend psychotherapy group      Tx plan objective: 1 1, 1 2   Therapist: Osito Donnelly MA

## 2021-07-07 NOTE — PSYCH
Subjective:     Patient ID: Boris Camp is a 36 y o  male  [facility] Clinical Progress Notes      Specialized Services Documentation  Therapist must complete separate progress note for each specific clinical activity in which the individual participated during the day  Allied Therapy   This group was facilitated virtually in a private office using [employer] and Approved [employer] Teams  Boris Camp consented to the use of tele-video modality of treatment  [id number]  Boris Camp  actively shared in [facility] group exploring DBT skill finding and getting people to like you  Feng Alvarez engaged in tasks exploring sharing and connecting with others  He identified that he struggles to make connections with others and was engaged throughout the session  Some initial effort and progress noted toward goals  Continue AT to explore healthy interpersonal effectiveness and role in practicing wellness tools       Tx Plan Objective: 1 1,1 4, Therapist:  Jayden SMITH

## 2021-07-07 NOTE — PSYCH
Subjective:     Patient ID: Jeanette Hunt is a 36 y o  male  [facility] Clinical Progress Notes      Specialized Services Documentation  Therapist must complete separate progress note for each specific clinical activity in which the individual participated during the day  Other ([id number]) Spoke to wife Maggi Khoury after receiving a phone call stating that she is attempting to help Darcie Mclaughlin log on to TEAMS and reported that he is becoming frustrated  CM assisted and re-explained the process of using the platform  CM also reiterated that Darcie Mclaughlin will need to be the only one in the group, as it is him that is the patient and also needing to respect the privacy of the group members  She expressed understanding  Jeanette Hunt did not wish to speak to CM directly as he was "frustrated " CM reminded wife of medication management appointment on Friday at 0930  CM contacted insurance for pre-certification - Spoke to [address] [place] [address] LEVEL OF CARE  Reference # [id number]    Case Management Note    WESLEY Alexander    Current suicide risk : Unable to assess    n a    Medications changes/added/denied? No    Treatment session number: 1    Individual Case Management Visit provided today?  Yes     [facility] follow up physician's orders: n/a

## 2021-07-07 NOTE — PSYCH
Subjective:     Patient ID: Selma Renae is a 36 y o  male  [facility] Clinical Progress Notes      Specialized Services Documentation  Therapist must complete separate progress note for each specific clinical activity in which the individual participated during the day  This was not shared due to this is a psychotherapy note  GROUP PSYCHOTHERAPY ([id number]) Group was facilitated virtually in a private office using HIPAA Compliant and Approved [employer] Teams  Paul Murillo consented to the use of tele-video modality of treatment and was virtually present for group psychotherapy today  The group engaged in discussion regarding the miracle question: "Suppose tonight, while you slept, a miracle occurred  When you awake tomorrow, what would be some of the things you would notice that would tell you life had suddenly gotten better?"    Paul Murillo seemed fairly engaged throughout the group session  Paul Murillo stated that one way their life would have gotten better is "I would like to feel refreshed and have my bills paid " Paul Murillo is encouraged to make progress towards goals and objectives through group participation and will continue to attend psychotherapy group  Tx plan objective: 1 1, 1 2   Therapist: Janeth Kemp MA    Education Therapy   [id number] Selma Renae actively shared in morning assessment and goal review  Presented as Receptive related to readiness to learn  Selma Renae was not here yesterday  did not present with any barriers to learning  1230-1407 Selma Renae engaged throughout the treatment day  Was engaged in learning related to Illness, Aftercare and Wellness Tools  Staff utilized Verbal and A/V teaching methods  Selma Renae shared area of learning and set a goal for outside of program to wash his car        Tx Plan Objective: 1 4, Therapist:  Janeth Kemp MA

## 2021-07-08 ENCOUNTER — OFFICE VISIT (OUTPATIENT)
Dept: PSYCHOLOGY | Facility: CLINIC | Age: 41
End: 2021-07-08
Payer: COMMERCIAL

## 2021-07-08 DIAGNOSIS — F19.959 SUBSTANCE-INDUCED PSYCHOTIC DISORDER (HCC): Primary | ICD-10-CM

## 2021-07-08 PROCEDURE — G0177 OPPS/PHP; TRAIN & EDUC SERV: HCPCS

## 2021-07-08 PROCEDURE — G0410 GRP PSYCH PARTIAL HOSP 45-50: HCPCS

## 2021-07-08 PROCEDURE — S0201 PARTIAL HOSPITALIZATION SERV: HCPCS

## 2021-07-08 PROCEDURE — G0176 OPPS/PHP;ACTIVITY THERAPY: HCPCS

## 2021-07-08 NOTE — PSYCH
Subjective:     Patient ID: Mynor Church is a 36 y o  male  [facility] Clinical Progress Notes      Specialized Services Documentation  Therapist must complete separate progress note for each specific clinical activity in which the individual participated during the day  Allied Therapy   This group was facilitated virtually in a private office using [employer] and Approved [employer] Teams  Mynor Church consented to the use of tele-video modality of treatment  [id number] Mynor Church  actively shared in [facility] group focused on distress tolerance skill self-soothe  Radha Antony was observed to be engaged in therapist led object meditation  Group discussed specific items that could help self soothe if in an anxiety crisis with encouragement to use these things regularly to manage stressors consistently  He identified he would put a stress ball  in  a self soothe kit  Some effort noted toward tx goal   Continue AT to encourage development of wellness skills and consistent practice  Tx Plan Objective: 1 1, Therapist:  Cheyanne SMITH    Education Therapy   [id number] Mynor Church actively shared in morning assessment and goal review  Presented as Receptive related to readiness to learn  Mynor Church did complete goal from last treatment day identifying gaining responsibility  did not present with any barriers to learning         Tx Plan Objective: 1 1, Therapist:  Cheyanne SMITH

## 2021-07-08 NOTE — PSYCH
Subjective:     Patient ID: Jeanette Hunt is a 36 y o  male  [facility] Clinical Progress Notes      Specialized Services Documentation  Therapist must complete separate progress note for each specific clinical activity in which the individual participated during the day  GROUP PSYCHOTHERAPY  ([id number]) Group was facilitated virtually in a private office using HIPAA Compliant and Approved [employer] Teams  Jeanette Hunt consented to the use of tele-video modality of treatment and was virtually present for group psychotherapy today  he attentively listened to [facility] share his life story as he co-led this session  Group encouraged power of learning about self, accepting illness and personal responsibility in recovery  Community resources reviewed in addition to personal resources like the affirmations  Progress toward goals noted  Continue psychotherapy to encourage self -awareness and healthy engagement of supports      TX Plan Objectives: 1 1, 1 2, 1 4   Therapist: Li Mullen MA, [facility]

## 2021-07-08 NOTE — PSYCH
Virtual Regular Visit      Assessment/Plan:    Problem List Items Addressed This Visit        Other    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[facility] [address] ) - Primary               Reason for visit is PHP VIRTUAL GROUP DUE TO COVID-19      Encounter provider [address] PARTIAL PSYCH PROGRAM    Provider located at [address] [place] [address]   [address] [facility] [id number]      Recent Visits  Date Type Provider Dept   07/07/21 Office Visit [address] PARTIAL PSYCH GROUP THERAPY [facility]   07/02/21 Office Visit [address] PARTIAL PSYCH GROUP THERAPY [facility]   Showing recent visits within past 7 days and meeting all other requirements  Today's Visits  Date Type Provider Dept   07/08/21 Office Visit [address] PARTIAL PSYCH GROUP THERAPY [facility]   Showing today's visits and meeting all other requirements  Future Appointments  No visits were found meeting these conditions  Showing future appointments within next 150 days and meeting all other requirements       The patient was identified by name and date of birth  April Corral was informed that this is a telemedicine visit and that the visit is being conducted through "[employer] and patient was informed that this is a secure, HIPAA-compliant platform  He agrees to proceed     My office door was closed  No one else was in the room  He acknowledged consent and understanding of privacy and security of the video platform  The patient has agreed to participate and understands they can discontinue the visit at any time  Patient is aware this is a billable service  Subjective  April Corral is a 36 y o  male          HPI     Past Medical History:   Diagnosis Date    Anxiety     Head injury     Hypertension        Past Surgical History:   Procedure Laterality Date    CORNEAL TRANSPLANT      EYE SURGERY      TONSILLECTOMY         Current Outpatient Medications   Medication Sig Dispense Refill    ascorbic acid (VITAMIN C) 250 MG tablet Take 1 tablet (250 mg total) by mouth daily 30 tablet 1    aspirin (ECOTRIN LOW STRENGTH) 81 mg EC tablet Take 1 tablet (81 mg total) by mouth daily 30 tablet 1    atorvastatin (LIPITOR) 10 mg tablet Take 1 tablet (10 mg total) by mouth daily with dinner 30 tablet 1    benztropine (COGENTIN) 1 mg tablet Take 1 tablet (1 mg total) by mouth daily at bedtime 30 tablet 1    cholecalciferol (VITAMIN D3) 400 units tablet Take 1 tablet (400 Units total) by mouth daily 30 tablet 1    folic acid (FOLVITE) 1 mg tablet Take 1 tablet (1 mg total) by mouth daily 30 tablet 1    haloperidol (HALDOL) 5 mg tablet Take 1 tablet (5 mg total) by mouth 2 (two) times a day 60 tablet 1    hydrOXYzine HCL (ATARAX) 25 mg tablet Take 1 tablet (25 mg total) by mouth every 6 (six) hours as needed for anxiety (mild anxiety) for up to 10 days 40 tablet 0    melatonin 3 mg Take 1 tablet (3 mg total) by mouth daily at bedtime 30 tablet 1    mirtazapine (REMERON) 15 mg tablet Take 1 tablet (15 mg total) by mouth daily at bedtime 30 tablet 1    polyethylene glycol (MIRALAX) 17 g packet Take 17 g by mouth daily      Zinc 50 MG TABS Take 50 mg by mouth daily      zinc sulfate (ZINCATE) 220 mg capsule Take 1 capsule (220 mg total) by mouth daily 30 capsule 1     No current facility-administered medications for this visit  Allergies   Allergen Reactions    Prednisone Delirium     Patient is not allergic but had had prednisone induced psychosis/treva    Adhesive [Medical Tape] Rash       Review of Systems    Video Exam    There were no vitals filed for this visit  Physical Exam     I spent FOUR GROUP HOURS PLUS CASE MANAGEMENT minutes with patient today in which greater than 50% of the time was spent in counseling/coordination of care regarding PHP - SEE NOTES          VIRTUAL VISIT DISCLAIMER    Joseph Chelsi acknowledges that he has consented to an online visit or consultation  He understands that the online visit is based solely on information provided by him, and that, in the absence of a face-to-face physical evaluation by the physician, the diagnosis he receives is both limited and provisional in terms of accuracy and completeness  This is not intended to replace a full medical face-to-face evaluation by the physician  Deborah Rodriguez understands and accepts these terms

## 2021-07-09 ENCOUNTER — OFFICE VISIT (OUTPATIENT)
Dept: PSYCHOLOGY | Facility: CLINIC | Age: 41
End: 2021-07-09
Payer: COMMERCIAL

## 2021-07-09 ENCOUNTER — TELEMEDICINE (OUTPATIENT)
Dept: PSYCHIATRY | Facility: CLINIC | Age: 41
End: 2021-07-09
Payer: COMMERCIAL

## 2021-07-09 DIAGNOSIS — F41.9 ANXIETY: ICD-10-CM

## 2021-07-09 DIAGNOSIS — F19.959 SUBSTANCE-INDUCED PSYCHOTIC DISORDER (HCC): Primary | ICD-10-CM

## 2021-07-09 PROCEDURE — G0177 OPPS/PHP; TRAIN & EDUC SERV: HCPCS

## 2021-07-09 PROCEDURE — G0176 OPPS/PHP;ACTIVITY THERAPY: HCPCS

## 2021-07-09 PROCEDURE — S0201 PARTIAL HOSPITALIZATION SERV: HCPCS

## 2021-07-09 PROCEDURE — 99212 OFFICE O/P EST SF 10 MIN: CPT | Performed by: PHYSICIAN ASSISTANT

## 2021-07-09 PROCEDURE — G0410 GRP PSYCH PARTIAL HOSP 45-50: HCPCS

## 2021-07-09 NOTE — PSYCH
Subjective:     Patient ID: Abundio Martinez is a 36 y o  male  [facility] Clinical Progress Notes      Specialized Services Documentation  Therapist must complete separate progress note for each specific clinical activity in which the individual participated during the day  GROUP PSYCHOTHERAPY ([id number]) Group was facilitated virtually in a private office using HIPAA Compliant and Approved [employer] Teams  Abundio Martinez consented to the use of tele-video modality of treatment and was virtually present for group psychotherapy today  The group engaged in the wellness assessment that evaluates progress on several different areas of wellness: physical, emotional, cognitive, vocational, social and spiritual  Clients rated their progress and discussed areas that need work  Mandeep Salazar shared that he was having difficulty sticking to a sleep schedule  The group provided insights from their journey's to help with sleep hygiene  He stated that he was falling asleep during group  He was encouraged to move locations from his bed to a chair  He made this change  Mandeep Salazar continues to make progress towards goals through verbal participation in group  Continue with psychotherapy     TX Plan Objectives: 1 1, 1 2, 1 4  Therapist: Gale Francis MA, [place]

## 2021-07-09 NOTE — PSYCH
Virtual Regular Visit      Assessment/Plan:    Problem List Items Addressed This Visit        Other    Anxiety    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[facility] [address] ) - Primary               Reason for visit is VIRTUAL PHP DUE TO COVID-19       Encounter provider [address] PARTIAL [address]    Provider located at [address] [place] [address]   [address] [facility] [address]      Recent Visits  Date Type Provider Dept   07/08/21 Office Visit [address] PARTIAL PSYCH GROUP THERAPY [facility]   07/07/21 Office Visit [address] PARTIAL PSYCH GROUP THERAPY [facility]   07/02/21 Office Visit [address] PARTIAL PSYCH GROUP THERAPY [facility]   Showing recent visits within past 7 days and meeting all other requirements  Today's Visits  Date Type Provider Dept   07/09/21 Office Visit [address] PARTIAL PSYCH GROUP THERAPY [facility]   Showing today's visits and meeting all other requirements  Future Appointments  No visits were found meeting these conditions  Showing future appointments within next 150 days and meeting all other requirements       The patient was identified by name and date of birth  April Corral was informed that this is a telemedicine visit and that the visit is being conducted through [employer] and patient was informed that this is a secure, HIPAA-compliant platform  He agrees to proceed     My office door was closed  No one else was in the room  He acknowledged consent and understanding of privacy and security of the video platform  The patient has agreed to participate and understands they can discontinue the visit at any time  Patient is aware this is a billable service  Subjective  April Corral is a 36 y o  male          HPI     Past Medical History:   Diagnosis Date    Anxiety     Head injury     Hypertension        Past Surgical History:   Procedure Laterality Date    CORNEAL TRANSPLANT      EYE SURGERY  TONSILLECTOMY         Current Outpatient Medications   Medication Sig Dispense Refill    ascorbic acid (VITAMIN C) 250 MG tablet Take 1 tablet (250 mg total) by mouth daily 30 tablet 1    aspirin (ECOTRIN LOW STRENGTH) 81 mg EC tablet Take 1 tablet (81 mg total) by mouth daily 30 tablet 1    atorvastatin (LIPITOR) 10 mg tablet Take 1 tablet (10 mg total) by mouth daily with dinner 30 tablet 1    benztropine (COGENTIN) 1 mg tablet Take 1 tablet (1 mg total) by mouth daily at bedtime 30 tablet 1    cholecalciferol (VITAMIN D3) 400 units tablet Take 1 tablet (400 Units total) by mouth daily 30 tablet 1    folic acid (FOLVITE) 1 mg tablet Take 1 tablet (1 mg total) by mouth daily 30 tablet 1    haloperidol (HALDOL) 5 mg tablet Take 1 tablet (5 mg total) by mouth 2 (two) times a day 60 tablet 1    hydrOXYzine HCL (ATARAX) 25 mg tablet Take 1 tablet (25 mg total) by mouth every 6 (six) hours as needed for anxiety (mild anxiety) for up to 10 days 40 tablet 0    melatonin 3 mg Take 1 tablet (3 mg total) by mouth daily at bedtime 30 tablet 1    mirtazapine (REMERON) 15 mg tablet Take 1 tablet (15 mg total) by mouth daily at bedtime 30 tablet 1    polyethylene glycol (MIRALAX) 17 g packet Take 17 g by mouth daily      Zinc 50 MG TABS Take 50 mg by mouth daily      zinc sulfate (ZINCATE) 220 mg capsule Take 1 capsule (220 mg total) by mouth daily 30 capsule 1     No current facility-administered medications for this visit  Allergies   Allergen Reactions    Prednisone Delirium     Patient is not allergic but had had prednisone induced psychosis/treva    Adhesive [Medical Tape] Rash       Review of Systems    Video Exam    There were no vitals filed for this visit  Physical Exam     I spent 5 hours of group + case management minutes with patient today in which greater than 50% of the time was spent in counseling/coordination of care regarding see notes         VIRTUAL VISIT DISCLAIMER    Anand Mancia Lalita acknowledges that he has consented to an online visit or consultation  He understands that the online visit is based solely on information provided by him, and that, in the absence of a face-to-face physical evaluation by the physician, the diagnosis he receives is both limited and provisional in terms of accuracy and completeness  This is not intended to replace a full medical face-to-face evaluation by the physician  Suyapa Meza understands and accepts these terms

## 2021-07-09 NOTE — PSYCH
Virtual Regular Visit              Encounter provider Gil Tolbert PA-C    Provider located at [address] [address] [phone]      Recent Visits  Date Type Provider Dept   07/02/21 Office Visit Lew Vila MD [facility] Noel Calderon recent visits within past 7 days and meeting all other requirements  Future Appointments  No visits were found meeting these conditions  Showing future appointments within next 150 days and meeting all other requirements       The patient was identified by name and date of birth  Annabella Gloria was informed that this is a telemedicine visit and that the visit is being conducted through [employer] and patient was informed that this is a secure, HIPAA-compliant platform  He agrees to proceed     My office door was closed  No one else was in the room  He acknowledged consent and understanding of privacy and security of the video platform  The patient has agreed to participate and understands they can discontinue the visit at any time  Patient is aware this is a billable service  VIRTUAL VISIT DISCLAIMER    Annabella Gloria acknowledges that he has consented to an online visit or consultation  He understands that the online visit is based solely on information provided by him, and that, in the absence of a face-to-face physical evaluation by the physician, the diagnosis he receives is both limited and provisional in terms of accuracy and completeness  This is not intended to replace a full medical face-to-face evaluation by the physician  Annabella Gloria understands and accepts these terms  Progress Note - Behavioral Health   [facility]  Annabella Gloria 36 y o  male MRN: [id number]     Progress at partial program: some improvement  Zaksugar Brady seen with his wife   Last seen by Dr Mely Mckeon 7/2/21 at which time meds unchanged  Rina Brady reports poor sleep, amanda difficulty falling asleep, daytime drowsiness, some tremors of upper extremities, feels nervous and overwhelmed when in environment with a lot of stimulation  Rina Brady does use CPAP  Also notes increase in appetite  Denies A/V hallucinations and paranoia  However, wife says Rina Brady is still adamant about some things that he thinks happened during hosp which didn't - e g , that his wife and other people were present when they weren't  Reports taking 9 mg melatonin at night  Denies significant caffeine, nicotine and denies alcohol use since beginning of June             ROS:   As above otherwise negative    Active Ambulatory Problems     Diagnosis Date Noted    Benign essential HTN 06/22/2021    Anxiety     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    Chronic arterial ischemic stroke 01/15/2021    Concussion with no loss of consciousness 02/16/2018    Contact dermatitis and other eczema, due to unspecified cause 10/29/2013    Disorder of skin and subcutaneous tissue 12/03/2014    Elevated blood-pressure reading without diagnosis of hypertension 08/20/2014    H/O concussion 12/15/2020    Fall 02/01/2021    Hematochezia 02/16/2015    Hyperlipidemia 01/01/2021    Impaired memory 01/01/2021    Moderate obstructive sleep apnea 04/16/2019    Phthisis bulbi 09/16/2002    Pityriasis versicolor 06/21/2012    Syncope 12/31/2020    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[facility] [address] )      Resolved Ambulatory Problems     Diagnosis Date Noted    Medical clearance for psychiatric admission 06/22/2021     Past Medical History:   Diagnosis Date    Head injury     Hypertension        Allergies   Allergen Reactions    Prednisone Delirium     Patient is not allergic but had had prednisone induced psychosis/treva    Adhesive [Medical Tape] Rash          Mental Status Evaluation:    Appearance:  adequate grooming   Behavior:  cooperative, calm   Speech:  coherent   Mood:  low   Affect: constricted   Thought Process:  coherent   Associations: intact associations   Thought Content:  no overt delusions   Perceptual Disturbances: none   Risk Potential: Suicidal ideation - None  Homicidal ideation - None   Sensorium:  oriented to person, place and time/date   Memory:  recent and remote memory grossly intact   Consciousness:  sedated   Attention: attention span and concentration appear shorter than expected for age   Insight:  intact   Judgment: intact   Gait/Station: unable to assess   Motor Activity: unable to assess today due to virtual visit       Laboratory results: I have personally reviewed all pertinent laboratory/tests results        Assessment   Diagnoses and all orders for this visit:    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[facility] [address] )    Anxiety       Current Outpatient Medications   Medication Sig Dispense Refill    ascorbic acid (VITAMIN C) 250 MG tablet Take 1 tablet (250 mg total) by mouth daily 30 tablet 1    aspirin (ECOTRIN LOW STRENGTH) 81 mg EC tablet Take 1 tablet (81 mg total) by mouth daily 30 tablet 1    atorvastatin (LIPITOR) 10 mg tablet Take 1 tablet (10 mg total) by mouth daily with dinner 30 tablet 1    benztropine (COGENTIN) 1 mg tablet Take 1 tablet (1 mg total) by mouth daily at bedtime 30 tablet 1    cholecalciferol (VITAMIN D3) 400 units tablet Take 1 tablet (400 Units total) by mouth daily 30 tablet 1    folic acid (FOLVITE) 1 mg tablet Take 1 tablet (1 mg total) by mouth daily 30 tablet 1    haloperidol (HALDOL) 5 mg tablet Take 1 tablet (5 mg total) by mouth 2 (two) times a day 60 tablet 1    hydrOXYzine HCL (ATARAX) 25 mg tablet Take 1 tablet (25 mg total) by mouth every 6 (six) hours as needed for anxiety (mild anxiety) for up to 10 days 40 tablet 0    melatonin 3 mg Take 1 tablet (3 mg total) by mouth daily at bedtime 30 tablet 1    mirtazapine (REMERON) 15 mg tablet Take 1 tablet (15 mg total) by mouth daily at bedtime 30 tablet 1    polyethylene glycol (MIRALAX) 17 g packet Take 17 g by mouth daily      Zinc 50 MG TABS Take 50 mg by mouth daily      zinc sulfate (ZINCATE) 220 mg capsule Take 1 capsule (220 mg total) by mouth daily 30 capsule 1     No current facility-administered medications for this visit  Plan    Planned medication and treatment changes: It is difficult to tell what symptoms are related to meds or sleep issues ([facility]) or underlying psychiatric d/o  Will move cogentin to am to get better control of tremors  Will consider decrease in haldol next week and possibly stopping remeron due to increase in appetite and unclear if this is benefiting sleep given hx of [facility]UC  Angie strongly encouraged f/u with PCP (amanda regarding dose of lipitor) and with sleep specialist      All current active medications have been reviewed  Continue treatment with group therapy and partial program      Risks / Benefits of Treatment:    Risks, benefits, and possible side effects of medications explained to patient and patient verbalizes understanding and agrees to medications  Counseling / Coordination of Care:    Patient's progress discussed with staff in treatment team meeting  Medications, treatment progress and treatment plan reviewed with patient      Marisela Yeung PA-C 07/09/21

## 2021-07-09 NOTE — PSYCH
Subjective:     Patient ID: April Corral is a 36 y o  male  [facility] Clinical Progress Notes      Specialized Services Documentation  Therapist must complete separate progress note for each specific clinical activity in which the individual participated during the day  This was not shared due to this is a psychotherapy note  GROUP PSYCHOTHERAPY ([id number]) Group was facilitated virtually in a private office using HIPAA Compliant and Approved [employer] Teams  Iron Burch consented to the use of tele-video modality of treatment and was virtually present for group psychotherapy today  The group read a mindfulness prayer called Senait Jones as a way to encourage members to practice journaling, reflecting, and thinking about gratitude and living in the present  They then spent three minutes reflecting and answered the following questions:     Cleansing Prayer  May I release all energies that are less than love  Help free my mind and body of all that no longer serves me  I send back any energies that arent mine, with love  I anchor into myself and the present moment      1  What feelings/thoughts came up in you as you read the Cleansing Prayer? 2  Which specific word/phrase that resonates with you the most? Why? Iron Burch was in medication review for most of the session, but was willing to provide feedback to peers when he was present  Iron Burch is encouraged to make progress towards goals and objectives through group participation and will continue to attend psychotherapy group  Tx plan objective: 1 1, 1 2   Therapist: Claudia Rao MA    Education Therapy   [id number] April Corral with prompts shared in morning assessment and goal review  Presented as No Interest related to readiness to learn  April Corral did complete goal from last treatment day identifying gaining productivity  did not present with any barriers to learning       [id number] April Corral engaged throughout the treatment day  Was engaged in learning related to Illness, Aftercare and Wellness Tools  Staff utilized Verbal and A/V teaching methods  Nanci Myleslondon shared area of learning and set a goal for outside of program to declutter and organize his home  Tx Plan Objective: 1 4, Therapist:  Yolanda Cevallos MA    Case Management Note  This case management session was facilitated virtually in a private office using HIPPA Compliant and Approved [employer] Teams  Samuel Rodriguez consented to the use of tele-video modality of treatment  Romie Rainey     Current suicide risk: low    ([id number]) Spoke with Samuel Rodriguez this afternoon  He reported feeling tired due to sleep disturbances and we debriefed on how things went at his medication review  He identified his plans for the weekend and was reminded to avoid taking naps during the day so that he could maximize his sleep at night  He denied SI/HI/Hallucinations  Medications changes/added/denied? yes     Treatment session number: 3     Individual Case Management Visit provided today? Yes    [facility] follow up physician's orders: see Bradley Archer PA-C's note

## 2021-07-09 NOTE — PSYCH
Subjective:     Patient ID: Maxim Thapa is a 36 y o  male  [facility] Clinical Progress Notes      Specialized Services Documentation  Therapist must complete separate progress note for each specific clinical activity in which the individual participated during the day  Allied Therapy   This group was facilitated virtually in a private office using [employer] and Approved [employer] Teams  Maxim Thapa consented to the use of tele-video modality of treatment  [id number] Maxim Thapa  actively shared in [facility] group focused on DBT emotional regulation skill understanding and naming emotions  Albaro Heller was engaged in [facility] writing task as group focused on the value of emotions and understanding triggers and sensations to various emotions  Emotions journal introduced  He shared that it is "easier" to not share feelings at times as a challenge to emotional expression  Some beginning progress toward treatment goal voiced  Continue AT to encourage emotional awareness and productive responses       Tx Plan Objective: 1 1,1 2, Therapist:  Windy SMITH

## 2021-07-12 ENCOUNTER — OFFICE VISIT (OUTPATIENT)
Dept: PSYCHOLOGY | Facility: CLINIC | Age: 41
End: 2021-07-12
Payer: COMMERCIAL

## 2021-07-12 ENCOUNTER — TELEMEDICINE (OUTPATIENT)
Dept: PSYCHIATRY | Facility: CLINIC | Age: 41
End: 2021-07-12
Payer: COMMERCIAL

## 2021-07-12 DIAGNOSIS — F19.959 SUBSTANCE-INDUCED PSYCHOTIC DISORDER (HCC): Primary | ICD-10-CM

## 2021-07-12 DIAGNOSIS — F41.9 ANXIETY: ICD-10-CM

## 2021-07-12 DIAGNOSIS — G47.33 MODERATE OBSTRUCTIVE SLEEP APNEA: ICD-10-CM

## 2021-07-12 PROCEDURE — G0177 OPPS/PHP; TRAIN & EDUC SERV: HCPCS

## 2021-07-12 PROCEDURE — S0201 PARTIAL HOSPITALIZATION SERV: HCPCS

## 2021-07-12 PROCEDURE — 99212 OFFICE O/P EST SF 10 MIN: CPT | Performed by: PHYSICIAN ASSISTANT

## 2021-07-12 PROCEDURE — G0410 GRP PSYCH PARTIAL HOSP 45-50: HCPCS

## 2021-07-12 PROCEDURE — G0176 OPPS/PHP;ACTIVITY THERAPY: HCPCS

## 2021-07-12 NOTE — PSYCH
Subjective:     Patient ID: Yoshi Castrejon is a 36 y o  male  [facility] Clinical Progress Notes      Specialized Services Documentation  Therapist must complete separate progress note for each specific clinical activity in which the individual participated during the day  This was not shared due to this is a psychotherapy note  GROUP PSYCHOTHERAPY ([id number]) Group was facilitated virtually in a private office using HIPAA Compliant and Approved [employer] Teams  Nahid Cruz consented to the use of tele-video modality of treatment and was virtually present for group psychotherapy today  The group received education on cognitive distortions The handout provided a list of common cognitive distortions and members were prompted to pick the two they struggle with the most     Nahid Cruz seemed minimally engaged throughout the session, as demonstrated by him having side conversations and falling asleep at times  Nahid Cruz stated that two cognitive distortions they struggle with the most are jumping to conclusions and disqualifying the positives  Nahid Cruz is encouraged to make progress towards goals and objectives through group participation and will continue to attend psychotherapy group  Tx plan objective: 1 1, 1 2   Therapist: Chris Shields MA    Education Therapy   [id number] Yoshi Castrejon quietly shared in morning assessment and goal review  Presented as No Interest related to readiness to learn  Yoshi Castrejon did complete goal from last treatment day identifying gaining organization  did not present with any barriers to learning  [id number] Yoshi Castrejon engaged throughout the treatment day  Was engaged in learning related to Illness, Aftercare and Wellness Tools  Staff utilized Verbal and A/V teaching methods  Yoshi Castrejon shared area of learning and set a goal for outside of program to spend time de-cluttering        Tx Plan Objective: 1 4, Therapist:  Chris Shields MA    Case Management Note Lizet Powell MA     Current suicide risk: low     Today is not a scheduled CM meeting with Franchesca Rojo  Additionally, Franchesca Rojo did not request to meet with this writer  Medications changes/added/denied? yes     Treatment session number: 4     Individual Case Management Visit provided today? No    [facility] follow up physician's orders: see Shira Pinedo PA-C's note

## 2021-07-12 NOTE — PSYCH
Virtual Regular Visit      Assessment/Plan:    Problem List Items Addressed This Visit        Other    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[facility] [address] ) - Primary          Goals addressed in session: Goal 2          Reason for visit is No chief complaint on file  Encounter provider [facility] PARTIAL PSYCH PROGRAM    Provider located at [address] [place] [address]   [address] [facility] [id number]      Recent Visits  Date Type Provider Dept   07/09/21 Office Visit [facility] PARTIAL PSYCH GROUP THERAPY [facility]   07/08/21 Office Visit [facility] PARTIAL PSYCH GROUP THERAPY [facility]   07/07/21 Office Visit [facility] PARTIAL PSYCH GROUP THERAPY [facility]   Showing recent visits within past 7 days and meeting all other requirements  Today's Visits  Date Type Provider Dept   07/12/21 Office Visit [facility] PARTIAL PSYCH GROUP THERAPY [facility]   Showing today's visits and meeting all other requirements  Future Appointments  No visits were found meeting these conditions  Showing future appointments within next 150 days and meeting all other requirements       The patient was identified by name and date of birth  Hermelindo Alexandre was informed that this is a telemedicine visit and that the visit is being conducted through [employer] and patient was informed that this is a secure, HIPAA-compliant platform  He agrees to proceed     My office door was closed  No one else was in the room  He acknowledged consent and understanding of privacy and security of the video platform  The patient has agreed to participate and understands they can discontinue the visit at any time  Patient is aware this is a billable service  Subjective  Hermelindo Alexandre is a 36 y o  male         HPI     Past Medical History:   Diagnosis Date    Anxiety     Head injury     Hypertension        Past Surgical History:   Procedure Laterality Date    CORNEAL TRANSPLANT      EYE SURGERY      TONSILLECTOMY         Current Outpatient Medications   Medication Sig Dispense Refill    ascorbic acid (VITAMIN C) 250 MG tablet Take 1 tablet (250 mg total) by mouth daily 30 tablet 1    aspirin (ECOTRIN LOW STRENGTH) 81 mg EC tablet Take 1 tablet (81 mg total) by mouth daily 30 tablet 1    atorvastatin (LIPITOR) 10 mg tablet Take 1 tablet (10 mg total) by mouth daily with dinner 30 tablet 1    benztropine (COGENTIN) 1 mg tablet Take 1 tablet (1 mg total) by mouth daily at bedtime 30 tablet 1    cholecalciferol (VITAMIN D3) 400 units tablet Take 1 tablet (400 Units total) by mouth daily 30 tablet 1    folic acid (FOLVITE) 1 mg tablet Take 1 tablet (1 mg total) by mouth daily 30 tablet 1    haloperidol (HALDOL) 5 mg tablet Take 1 tablet (5 mg total) by mouth 2 (two) times a day 60 tablet 1    hydrOXYzine HCL (ATARAX) 25 mg tablet Take 1 tablet (25 mg total) by mouth every 6 (six) hours as needed for anxiety (mild anxiety) for up to 10 days 40 tablet 0    melatonin 3 mg Take 1 tablet (3 mg total) by mouth daily at bedtime 30 tablet 1    polyethylene glycol (MIRALAX) 17 g packet Take 17 g by mouth daily      Zinc 50 MG TABS Take 50 mg by mouth daily      zinc sulfate (ZINCATE) 220 mg capsule Take 1 capsule (220 mg total) by mouth daily 30 capsule 1     No current facility-administered medications for this visit  Allergies   Allergen Reactions    Prednisone Delirium     Patient is not allergic but had had prednisone induced psychosis/treva    Adhesive [Medical Tape] Rash       Review of Systems    Video Exam    There were no vitals filed for this visit  Physical Exam     I spent FOUR GROUP HOURS PLUS CASE MANAGEMENT minutes with patient today in which greater than 50% of the time was spent in counseling/coordination of care regarding PHP - SEE NOTES        VIRTUAL VISIT DISCLAIMER    Joseph Melaraangelica acknowledges that he has consented to an online visit or consultation  He understands that the online visit is based solely on information provided by him, and that, in the absence of a face-to-face physical evaluation by the physician, the diagnosis he receives is both limited and provisional in terms of accuracy and completeness  This is not intended to replace a full medical face-to-face evaluation by the physician  Marie Larkin understands and accepts these terms

## 2021-07-12 NOTE — PSYCH
Subjective:     Patient ID: Jeanette Hunt is a 36 y o  male  [facility] Clinical Progress Notes      Specialized Services Documentation  Therapist must complete separate progress note for each specific clinical activity in which the individual participated during the day  Group Psychotherapy  (10:30-11:30) Group was facilitated virtually in a private office using HIPAA Compliant and Approved Microsoft Teams  Darcie Celis consented to the use of tele-video modality of treatment and was virtually present for group psychotherapy today  This group was on anger and emotional regulation  Participants were engaged in conversation about one thing that leads them to feeling the emotion of anger  Participants discussed prompting events and interpretations of events that contribute and the common themes such as feeling something is unjust or unfair  Participants discussed the biological and behavioral signs of being angry, and the link between anger, anxiety and depression  Participants discussed ways of regulating anger such as identifying it and communicating it to others, (Name it to [facility] it) deep breathing, physical exercise, and the importance of recognizing their own emotional state and physical needs (H A L T , asking if one is Hungry, Angry, Lonely  Tired)  Darcie Mclaughlin was present on camera  He briefly contributed being feeling anger about his parents and experiencing anger when they "try to control" him  Darcie Mclaughlin had his eyes closed during portions of this group and did not participate further in discussion       Tx Goal Objective: 1 1,1 2  Therapist: BHAKTI Rubi

## 2021-07-12 NOTE — PSYCH
Virtual Regular Visit         Encounter provider Marshall Quinones PA-C    Provider located at [address] [address] [phone]      Recent Visits  Date Type Provider Dept   07/09/21 Telemedicine Marshall Quinones PA-C [facility] recent visits within past 7 days and meeting all other requirements  Future Appointments  No visits were found meeting these conditions  Showing future appointments within next 150 days and meeting all other requirements       The patient was identified by name and date of birth  Alina Ibrahim was informed that this is a telemedicine visit and that the visit is being conducted through [employer] and patient was informed that this is a secure, HIPAA-compliant platform  He agrees to proceed     My office door was closed  No one else was in the room  He acknowledged consent and understanding of privacy and security of the video platform  The patient has agreed to participate and understands they can discontinue the visit at any time  Patient is aware this is a billable service  VIRTUAL VISIT DISCLAIMER    Alina Ibrahim acknowledges that he has consented to an online visit or consultation  He understands that the online visit is based solely on information provided by him, and that, in the absence of a face-to-face physical evaluation by the physician, the diagnosis he receives is both limited and provisional in terms of accuracy and completeness  This is not intended to replace a full medical face-to-face evaluation by the physician  Alina Ibrahim understands and accepts these terms  Progress Note - Behavioral Health   [facility], [facility]  Alina July 36 y o  male MRN: [id number]     Progress at partial program: unchanged  Al Rodriguez seen with his wife  Both note that Al Rodriguez not doing much better    Al Rodriguez thinks that "everyone is lying to him and everyone is against him"  Continues to have high anxiety when he drives or when he is in a stimulating environment  C/o hypersomnia, daytime drowsiness  Denies SI/HI  Enrico Moore says he feels he needs therapy from concussion and head trauma program at [address]  Enrico Moore last saw them 5/12/21 and this note was reviewed  Further cognitive and vestibular evaluations were offered to Enrico Moore but he declined them at that time              ROS:   As above otherwise negative    Active Ambulatory Problems     Diagnosis Date Noted    Benign essential HTN 06/22/2021    Anxiety     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    Chronic arterial ischemic stroke 01/15/2021    Concussion with no loss of consciousness 02/16/2018    Contact dermatitis and other eczema, due to unspecified cause 10/29/2013    Disorder of skin and subcutaneous tissue 12/03/2014    Elevated blood-pressure reading without diagnosis of hypertension 08/20/2014    H/O concussion 12/15/2020    Fall 02/01/2021    Hematochezia 02/16/2015    Hyperlipidemia 01/01/2021    Impaired memory 01/01/2021    Moderate obstructive sleep apnea 04/16/2019    Phthisis bulbi 09/16/2002    Pityriasis versicolor 06/21/2012    Syncope 12/31/2020    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[facility] [address] )      Resolved Ambulatory Problems     Diagnosis Date Noted    Medical clearance for psychiatric admission 06/22/2021     Past Medical History:   Diagnosis Date    Head injury     Hypertension      Allergies   Allergen Reactions    Prednisone Delirium     Patient is not allergic but had had prednisone induced psychosis/treva    Adhesive [Medical Tape] Rash          Mental Status Evaluation:    Appearance:  age appropriate, casually dressed, adequate grooming   Behavior:  guarded   Speech:  normal rate and volume   Mood:  anxious, irritable   Affect:  constricted   Thought Process:  concrete   Associations: concrete associations   Thought Content:  overvalued ideas that others are against him   Perceptual Disturbances: none   Risk Potential: Suicidal ideation - None  Homicidal ideation - None   Sensorium:  oriented to person, place and time/date   Memory:  recent and remote memory grossly intact   Consciousness:  mildly sedated   Attention: attention span and concentration are age appropriate   Insight:  limited   Judgment: fair   Gait/Station: unable to assess   Motor Activity: unable to assess today due to virtual visit       Laboratory results: I have personally reviewed all pertinent laboratory/tests results        Assessment   Diagnoses and all orders for this visit:    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[facility] [address] )    Moderate obstructive sleep apnea    Anxiety       Current Outpatient Medications   Medication Sig Dispense Refill    ascorbic acid (VITAMIN C) 250 MG tablet Take 1 tablet (250 mg total) by mouth daily 30 tablet 1    aspirin (ECOTRIN LOW STRENGTH) 81 mg EC tablet Take 1 tablet (81 mg total) by mouth daily 30 tablet 1    atorvastatin (LIPITOR) 10 mg tablet Take 1 tablet (10 mg total) by mouth daily with dinner 30 tablet 1    benztropine (COGENTIN) 1 mg tablet Take 1 tablet (1 mg total) by mouth daily at bedtime 30 tablet 1    cholecalciferol (VITAMIN D3) 400 units tablet Take 1 tablet (400 Units total) by mouth daily 30 tablet 1    folic acid (FOLVITE) 1 mg tablet Take 1 tablet (1 mg total) by mouth daily 30 tablet 1    haloperidol (HALDOL) 5 mg tablet Take 1 tablet (5 mg total) by mouth 2 (two) times a day 60 tablet 1    hydrOXYzine HCL (ATARAX) 25 mg tablet Take 1 tablet (25 mg total) by mouth every 6 (six) hours as needed for anxiety (mild anxiety) for up to 10 days 40 tablet 0    melatonin 3 mg Take 1 tablet (3 mg total) by mouth daily at bedtime 30 tablet 1    polyethylene glycol (MIRALAX) 17 g packet Take 17 g by mouth daily      Zinc 50 MG TABS Take 50 mg by mouth daily      zinc sulfate (ZINCATE) 220 mg capsule Take 1 capsule (220 mg total) by mouth daily 30 capsule 1     No current facility-administered medications for this visit  Plan    Planned medication and treatment changes:  Stop remeron- may be making effects of PHUC worse  Don't think we are getting any benefit form this  Cont haldol 5 mg bid and cogentin 1 mg/d  Cont melatonin and atarax prn  Recommended again f/u with sleep specialist and f/u with [facility] concussion and head trauma (was last seen 5/12/21)  Bring up your anxiety in group so we can work on this  Need to slowly increase stimulation (inc driving) as tolerated  Da Perdomo that OP providers will have to ok him to return to work  All current active medications have been reviewed  Continue treatment with group therapy and partial program      Risks / Benefits of Treatment:    Risks, benefits, and possible side effects of medications explained to patient and patient verbalizes understanding and agrees to medications  Counseling / Coordination of Care:    Patient's progress discussed with staff in treatment team meeting  Medications, treatment progress and treatment plan reviewed with patient      Sergio Cherry PA-C 07/12/21

## 2021-07-12 NOTE — PSYCH
Subjective:     Patient ID: Yoshi Castrejon is a 36 y o  male  [facility] Clinical Progress Notes      Specialized Services Documentation  Therapist must complete separate progress note for each specific clinical activity in which the individual participated during the day  Allied Therapy   This group was facilitated virtually in a private office using [employer] and Approved [employer] Teams  Yoshi Castrejon consented to the use of tele-video modality of treatment  [address] actively shared in [facility] group focused on DBT skill wise mind  He engaged in tasks exploring differences between reasonable and emotion mind and ways to get to wise mind  Group explored the benefits of mindfulness and practiced ways to slow down to begin to develop wise mind  Group reinforced role of participating with wise mind in order to prevent getting stuck in the past or fears of the future  Mcghee mind meditation practiced  Some effort toward treatment goal noted  Continue AT to encourage healthy skill development and practice of explored strategies         Tx Plan Objective: 1 1, Therapist:  Meagan SMITH

## 2021-07-13 ENCOUNTER — APPOINTMENT (OUTPATIENT)
Dept: PSYCHOLOGY | Facility: CLINIC | Age: 41
End: 2021-07-13
Payer: COMMERCIAL

## 2021-07-14 ENCOUNTER — OFFICE VISIT (OUTPATIENT)
Dept: PSYCHOLOGY | Facility: CLINIC | Age: 41
End: 2021-07-14
Payer: COMMERCIAL

## 2021-07-14 DIAGNOSIS — F19.959 SUBSTANCE-INDUCED PSYCHOTIC DISORDER (HCC): Primary | ICD-10-CM

## 2021-07-14 PROCEDURE — G0176 OPPS/PHP;ACTIVITY THERAPY: HCPCS

## 2021-07-14 PROCEDURE — S0201 PARTIAL HOSPITALIZATION SERV: HCPCS

## 2021-07-14 PROCEDURE — G0410 GRP PSYCH PARTIAL HOSP 45-50: HCPCS

## 2021-07-14 PROCEDURE — G0177 OPPS/PHP; TRAIN & EDUC SERV: HCPCS

## 2021-07-14 NOTE — PSYCH
Subjective:     Patient ID: Alina Ibrahim is a 36 y o  male  [facility] Clinical Progress Notes      Specialized Services Documentation  Therapist must complete separate progress note for each specific clinical activity in which the individual participated during the day  GROUP PSYCHOTHERAPY ([id number]) Group was facilitated virtually in a private office using HIPAA Compliant and Approved [employer] Teams  Al Rodriguez consented to the use of tele-video modality of treatment and was virtually present for group psychotherapy today  The group engaged in education about self-sabotaging behavior  We specifically focused on self-sabotaging habits related to how they approach change  Each member was asked to answer the following questions after reading the following bullets:     You expect yourself to succeed in making life changes without designating any time or mental space to accomplish them   You see your capacity to change as being dependent on other peoples behavior  For example, youd exercise more or make better spending choices if your spouse was more supportive and on board  Violeta Amaya a perfectionist who is dismissive of incremental improvements, and youre only satisfied when 100 percent of a problem is fixed  - Which of the statements resonated with you the most? Why?  - How will you work on this? Al Rodriguez seemed fairly engaged throughout the group session  Al Zavalaaver picked the first statement  Al Rodriguez is encouraged to make progress towards goals and objectives through group participation and will continue to attend psychotherapy group       Tx plan objective: 1 1, 1 2   Therapist: Tyrel Vasquez MA

## 2021-07-14 NOTE — PSYCH
Virtual Regular Visit    Verification of patient location:    Patient is currently located in the state [facility]  Patient is currently located in a state in which I am licensed      Assessment/Plan:    Problem List Items Addressed This Visit        Other    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[facility] [address] ) - Primary          Goals addressed in session: Goal 2          Reason for visit is No chief complaint on file  Encounter provider [facility] PARTIAL PSYCH PROGRAM    Provider located at [address] [place] [address]  [id number] [place] [id number]      Recent Visits  Date Type Provider Dept   07/12/21 Office Visit [facility] PARTIAL PSYCH GROUP THERAPY [facility]   07/09/21 Office Visit [facility] PARTIAL PSYCH GROUP THERAPY [facility]   07/08/21 Office Visit [facility] PARTIAL PSYCH GROUP THERAPY [facility]   07/07/21 Office Visit [facility] PARTIAL PSYCH GROUP THERAPY [facility]   Showing recent visits within past 7 days and meeting all other requirements  Today's Visits  Date Type Provider Dept   07/14/21 Office Visit [facility] PARTIAL PSYCH GROUP THERAPY [facility]   Showing today's visits and meeting all other requirements  Future Appointments  No visits were found meeting these conditions  Showing future appointments within next 150 days and meeting all other requirements       The patient was identified by name and date of birth  Alec Elmore was informed that this is a telemedicine visit and that the visit is being conducted throughMicrosoft Teams and patient was informed that this is a secure, HIPAA-compliant platform  He agrees to proceed     My office door was closed  No one else was in the room  He acknowledged consent and understanding of privacy and security of the video platform  The patient has agreed to participate and understands they can discontinue the visit at any time      Patient is aware this is a billable patient today in which greater than 50% of the time was spent in counseling/coordination of care regarding PHP - SEE NOTES  VIRTUAL VISIT DISCLAIMER    Mynor Church verbally agrees to participate in [employer]  Pt is aware that Virtual Care Services could be limited without vital signs or the ability to perform a full hands-on physical exam  Eyal Celis understands he or the provider may request at any time to terminate the video visit and request the patient to seek care or treatment in person

## 2021-07-14 NOTE — PSYCH
Subjective:     Patient ID: Jennifer Freeman is a 36 y o  male  [facility] Clinical Progress Notes      Specialized Services Documentation  Therapist must complete separate progress note for each specific clinical activity in which the individual participated during the day  Allied Therapy   This group was facilitated virtually in a private office using [employer] and Approved [employer] Teams  Jennifer Freeman consented to the use of tele-video modality of treatment  [address]  actively shared in [facility] group exploring DBT distress tolerance crisis survival strategies  Group explored crisis survival strategies ACCEPTS, TIP, and STOP) reinforcing actions one could take to learn to tolerate stressful experiences, thoughts and urges  Yessenia Brar felt he could put effort into practicing Pros and Cons as well as through healthy activity  Some progress toward goal noted  Continue AT to encourage learning, practice and home practice of skills to manage distress        Tx Plan Objective: 1 1, Therapist:  Tu SMITH

## 2021-07-14 NOTE — PSYCH
Subjective:     Patient ID: Casey Garcia is a 36 y o  male  [facility] Clinical Progress Notes      Specialized Services Documentation  Therapist must complete separate progress note for each specific clinical activity in which the individual participated during the day  This was not shared due to this is a psychotherapy note  GROUP PSYCHOTHERAPY ([id number]) Group was facilitated virtually in a private office using HIPAA Compliant and Approved [employer] Teams  [facility] consented to the use of tele-video modality of treatment and was virtually present for group psychotherapy today  The group engaged in education about the cycle of depression  The group provided personal examples of how they engage in the cycle by responding to the following questions:  1  What is one negative (irrational, exaggerated) thought that I associate with my stressor? 2  What are some physical symptoms you experience due to this stressor? [facility] seemed fairly engaged throughout the group session  [facility] stated that one negative thought they associate with their stressor is I feel hopeless  Lyle Phelps is encouraged to make progress towards goals and objectives through group participation and will continue to attend psychotherapy group  Tx plan objective: 1 1, 1 2   Therapist: Dionne Burkitt, MA    Education Therapy   [id number] Casey Garcia actively shared in morning assessment and goal review  Presented as Receptive related to readiness to learn  Casey Garcia did complete goal from last treatment day identifying gaining optimism  did not present with any barriers to learning       Tx Plan Objective: 1 4, Therapist:  Dionne Burkitt, MA

## 2021-07-14 NOTE — PSYCH
Subjective:     Patient ID: William Zambrano is a 36 y o  male  [facility] Clinical Progress Notes      Specialized Services Documentation  Therapist must complete separate progress note for each specific clinical activity in which the individual participated during the day  Group Psychotherapy [id number]    This group was facilitated virtually in a private office using [employer] and Approved [employer] Teams  William Zambrano consented to the use of tele-video modality of treatment  Group education focused on myths and facts about mental illness  Statements discussed include:  1) Mental illness is always hereditary (myth)  2) Family members and friends can have important input about medications and symptoms management (fact)  3) Taking medications is a cure for mental illness (myth)  4) Im in depressed, beer will make me feel better (myth)  5) Crack cocaine can bring on psychotic symptoms (fact)  6)  People with Bipolar Disorder or Depression are dangerous (myth)  7) Mental illness means you cannot be productive (myth)   William Zambrano actively participated in the group activity where he and his peers shared whether they believed the statement was a myth or fact and rationale  William Zambrano is encouraged to make progress towards goals and objectives through group participation and will continue to attend wellness group  Tx Plan Objective: 1 1,1 2, Therapist:  [employer], MSW    Education Therapy   [id number] William Zambrano actively shared in morning assessment and goal review  Presented as Receptive related to readiness to learn  William Zambrano did not complete goal from last treatment day identifying gaining n/a - was not present for group yesterday  did not present with any barriers to learning  [phone] William Zambrano engaged throughout the treatment day  Was engaged in learning related to [employer]  Staff utilized Verbal and A/V teaching methods    Mihir Crespo Lalita shared area of learning and set a goal for outside of program to spend time with his kids  Tx Plan Objective: 1 1,1 4, Therapist:  WESLEY Weber    Other: Today was not a scheduled CM meeting day  Additionally, Joseph Gagnon did not request a meeting  Case Management Note    WESLEY Weber    Current suicide risk : Unable to assess        Medications changes/added/denied? No    Treatment session number: 5    Individual Case Management Visit provided today?  No    [facility] follow up physician's orders: n/a

## 2021-07-15 ENCOUNTER — OFFICE VISIT (OUTPATIENT)
Dept: PSYCHOLOGY | Facility: CLINIC | Age: 41
End: 2021-07-15
Payer: COMMERCIAL

## 2021-07-15 DIAGNOSIS — F19.959 SUBSTANCE-INDUCED PSYCHOTIC DISORDER (HCC): Primary | ICD-10-CM

## 2021-07-15 PROCEDURE — S0201 PARTIAL HOSPITALIZATION SERV: HCPCS

## 2021-07-15 PROCEDURE — G0176 OPPS/PHP;ACTIVITY THERAPY: HCPCS

## 2021-07-15 PROCEDURE — G0410 GRP PSYCH PARTIAL HOSP 45-50: HCPCS

## 2021-07-15 NOTE — PSYCH
Subjective:     Patient ID: Lizet Love is a 36 y o  male  [facility] Clinical Progress Notes      Specialized Services Documentation  Therapist must complete separate progress note for each specific clinical activity in which the individual participated during the day  Group Psychotherapy [id number]    This group was facilitated virtually in a private office using [employer] and Approved [employer] Teams  Liezt Love consented to the use of tele-video modality of treatment  Lizet Love was not present for this group  Tx Plan Objective: n/a, Therapist:  WESLEY Michaels        Education Therapy   [id number] Lizet Lvoe actively shared in morning assessment and goal review  Presented as Receptive related to readiness to learn  Lizet Love did complete goal from last treatment day identifying gaining accomploshment  did not present with any barriers to learning  [id number] Lizet Love was not present for wrap up  Tx Plan Objective: 1 1,1 4, Therapist:  WESLEY Michaels    Other: ([id number]) CM spoke to Lizet Love and his wife  Discussed OP appointments with [facility], which are scheduled  Discussed discharge plan for next Friday  Lizet Love shared that he is positively benefiting from the program, however, the technology aspect is frustrating  Lizet Love shared that he will not be in program this afternoon due to wanting to attend an appointment with his daughter  Case Management Note    WESLEY Michaels    Current suicide risk : Low     Lizet Love denies SI, plan or intent    Medications changes/added/denied? No    Treatment session number: 6    Individual Case Management Visit provided today?  Yes     [facility] follow up physician's orders: n/a

## 2021-07-15 NOTE — PSYCH
Virtual Regular Visit    Verification of patient location:    Patient is currently located in the state [facility]  Patient is currently located in a state in which I am licensed      Assessment/Plan:    Problem List Items Addressed This Visit        Other    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[facility] [address] ) - Primary          Goals addressed in session: Goal 1          Reason for visit is No chief complaint on file  Encounter provider [facility] PARTIAL PSYCH PROGRAM    Provider located at [address] [place] [address]  [id number] [facility] [id number]      Recent Visits  Date Type Provider Dept   07/14/21 Office Visit [facility] PARTIAL PSYCH GROUP THERAPY [facility]   07/12/21 Office Visit [facility] PARTIAL PSYCH GROUP THERAPY [facility]   07/09/21 Office Visit [facility] PARTIAL PSYCH GROUP THERAPY [facility]   07/08/21 Office Visit [facility] PARTIAL PSYCH GROUP THERAPY [facility]   Showing recent visits within past 7 days and meeting all other requirements  Today's Visits  Date Type Provider Dept   07/15/21 Office Visit [facility] PARTIAL PSYCH GROUP THERAPY [facility]   Showing today's visits and meeting all other requirements  Future Appointments  No visits were found meeting these conditions  Showing future appointments within next 150 days and meeting all other requirements       The patient was identified by name and date of birth  Shari Spotted was informed that this is a telemedicine visit and that the visit is being conducted throughMicrosoft Teams and patient was informed that this is a secure, HIPAA-compliant platform  He agrees to proceed     My office door was closed  No one else was in the room  He acknowledged consent and understanding of privacy and security of the video platform  The patient has agreed to participate and understands they can discontinue the visit at any time      Patient is aware this is a billable service  Subjective  Lizet Love is a 36 y o  male   HPI     Past Medical History:   Diagnosis Date    Anxiety     Head injury     Hypertension        Past Surgical History:   Procedure Laterality Date    CORNEAL TRANSPLANT      EYE SURGERY      TONSILLECTOMY         Current Outpatient Medications   Medication Sig Dispense Refill    ascorbic acid (VITAMIN C) 250 MG tablet Take 1 tablet (250 mg total) by mouth daily 30 tablet 1    aspirin (ECOTRIN LOW STRENGTH) 81 mg EC tablet Take 1 tablet (81 mg total) by mouth daily 30 tablet 1    atorvastatin (LIPITOR) 10 mg tablet Take 1 tablet (10 mg total) by mouth daily with dinner 30 tablet 1    benztropine (COGENTIN) 1 mg tablet Take 1 tablet (1 mg total) by mouth daily at bedtime 30 tablet 1    cholecalciferol (VITAMIN D3) 400 units tablet Take 1 tablet (400 Units total) by mouth daily 30 tablet 1    folic acid (FOLVITE) 1 mg tablet Take 1 tablet (1 mg total) by mouth daily 30 tablet 1    haloperidol (HALDOL) 5 mg tablet Take 1 tablet (5 mg total) by mouth 2 (two) times a day 60 tablet 1    hydrOXYzine HCL (ATARAX) 25 mg tablet Take 1 tablet (25 mg total) by mouth every 6 (six) hours as needed for anxiety (mild anxiety) for up to 10 days 40 tablet 0    melatonin 3 mg Take 1 tablet (3 mg total) by mouth daily at bedtime 30 tablet 1    polyethylene glycol (MIRALAX) 17 g packet Take 17 g by mouth daily      Zinc 50 MG TABS Take 50 mg by mouth daily      zinc sulfate (ZINCATE) 220 mg capsule Take 1 capsule (220 mg total) by mouth daily 30 capsule 1     No current facility-administered medications for this visit  Allergies   Allergen Reactions    Prednisone Delirium     Patient is not allergic but had had prednisone induced psychosis/treva    Adhesive [Medical Tape] Rash       Review of Systems    Video Exam    There were no vitals filed for this visit      Physical Exam     I spent FOUR GROUP HOURS PLUS CASE MANAGEMENT minutes with patient today in which greater than 50% of the time was spent in counseling/coordination of care regarding PHP - SEE NOTES  VIRTUAL VISIT DISCLAIMER    Jeanette Hunt verbally agrees to participate in [employer]  Pt is aware that Virtual Care Services could be limited without vital signs or the ability to perform a full hands-on physical exam  Eyal Celis understands he or the provider may request at any time to terminate the video visit and request the patient to seek care or treatment in person

## 2021-07-15 NOTE — PSYCH
Subjective:     Patient ID: Hank Veliz is a 36 y o  male  [facility] Clinical Progress Notes      Specialized Services Documentation  Therapist must complete separate progress note for each specific clinical activity in which the individual participated during the day  Allied Therapy   This group was facilitated virtually in a private office using [employer] and Approved [employer] Teams  Hank Veliz consented to the use of tele-video modality of treatment  [id number]  Hank Veliz  actively shared in [facility] [place] group focused on managing anger and emotional regulation skills  Jigna Emmanuel engaged in task exploring effective versus ineffective ways in addition to skills to refocus in the moment  He continues to share, however appears drowsy  Group explored the benefits of positive choices with intense emotion and factors that increase emotional vulnerability  Some  work toward goal noted   Continue AT to explore wellness tool awareness and practice       Tx Plan Objective: 1 1, Therapist:  Johnathan SALGUEROBC

## 2021-07-16 ENCOUNTER — APPOINTMENT (OUTPATIENT)
Dept: PSYCHOLOGY | Facility: CLINIC | Age: 41
End: 2021-07-16
Payer: COMMERCIAL

## 2021-07-19 ENCOUNTER — OFFICE VISIT (OUTPATIENT)
Dept: PSYCHOLOGY | Facility: CLINIC | Age: 41
End: 2021-07-19
Payer: COMMERCIAL

## 2021-07-19 ENCOUNTER — TELEMEDICINE (OUTPATIENT)
Dept: PSYCHIATRY | Facility: CLINIC | Age: 41
End: 2021-07-19
Payer: COMMERCIAL

## 2021-07-19 DIAGNOSIS — F32.3 SEVERE MAJOR DEPRESSION WITH PSYCHOTIC FEATURES (HCC): ICD-10-CM

## 2021-07-19 DIAGNOSIS — F19.959 SUBSTANCE-INDUCED PSYCHOTIC DISORDER (HCC): Primary | ICD-10-CM

## 2021-07-19 PROCEDURE — G0176 OPPS/PHP;ACTIVITY THERAPY: HCPCS

## 2021-07-19 PROCEDURE — S0201 PARTIAL HOSPITALIZATION SERV: HCPCS

## 2021-07-19 PROCEDURE — G0177 OPPS/PHP; TRAIN & EDUC SERV: HCPCS

## 2021-07-19 PROCEDURE — G0410 GRP PSYCH PARTIAL HOSP 45-50: HCPCS

## 2021-07-19 PROCEDURE — 99212 OFFICE O/P EST SF 10 MIN: CPT | Performed by: PHYSICIAN ASSISTANT

## 2021-07-19 RX ORDER — HYDROXYZINE HYDROCHLORIDE 25 MG/1
TABLET, FILM COATED ORAL
Qty: 50 TABLET | Refills: 0 | Status: SHIPPED | OUTPATIENT
Start: 2021-07-19

## 2021-07-19 NOTE — PSYCH
Virtual Regular Visit    Verification of patient location:    Patient is currently located in the state of [place]  Patient is currently located in a state in which I am licensed      Assessment/Plan:    Problem List Items Addressed This Visit        Other    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[address] ) - Primary          Goals addressed in session: Goal 3           Reason for visit is No chief complaint on file  Encounter provider [address] PARTIAL PSYCH PROGRAM    Provider located at [address] [place] [address]   [address] [place] [id number]      Recent Visits  Date Type Provider Dept   07/15/21 Office Visit [address] PARTIAL PSYCH GROUP THERAPY [facility]   07/14/21 Office Visit [address] PARTIAL PSYCH GROUP THERAPY [facility]   07/12/21 Office Visit [address] PARTIAL PSYCH GROUP THERAPY [facility]   Showing recent visits within past 7 days and meeting all other requirements  Today's Visits  Date Type Provider Dept   07/19/21 Office Visit [address] PARTIAL PSYCH GROUP THERAPY [facility]   Showing today's visits and meeting all other requirements  Future Appointments  No visits were found meeting these conditions  Showing future appointments within next 150 days and meeting all other requirements       The patient was identified by name and date of birth  Wilmer Ghosh was informed that this is a telemedicine visit and that the visit is being conducted throughMicrosoft Teams and patient was informed that this is a secure, HIPAA-compliant platform  He agrees to proceed     My office door was closed  No one else was in the room  He acknowledged consent and understanding of privacy and security of the video platform  The patient has agreed to participate and understands they can discontinue the visit at any time  Patient is aware this is a billable service  Subjective  Wilmer Ghosh is a 36 y o  male         HPI     Past Medical History:   Diagnosis Date    Anxiety     Head injury     Hypertension        Past Surgical History:   Procedure Laterality Date    CORNEAL TRANSPLANT      EYE SURGERY      TONSILLECTOMY         Current Outpatient Medications   Medication Sig Dispense Refill    ascorbic acid (VITAMIN C) 250 MG tablet Take 1 tablet (250 mg total) by mouth daily 30 tablet 1    aspirin (ECOTRIN LOW STRENGTH) 81 mg EC tablet Take 1 tablet (81 mg total) by mouth daily 30 tablet 1    atorvastatin (LIPITOR) 10 mg tablet Take 1 tablet (10 mg total) by mouth daily with dinner 30 tablet 1    benztropine (COGENTIN) 1 mg tablet Take 1 tablet (1 mg total) by mouth daily at bedtime 30 tablet 1    cholecalciferol (VITAMIN D3) 400 units tablet Take 1 tablet (400 Units total) by mouth daily 30 tablet 1    folic acid (FOLVITE) 1 mg tablet Take 1 tablet (1 mg total) by mouth daily 30 tablet 1    haloperidol (HALDOL) 5 mg tablet Take 1 tablet (5 mg total) by mouth 2 (two) times a day 60 tablet 1    hydrOXYzine HCL (ATARAX) 25 mg tablet 1-2 po q 6 hrs prn anxiety 50 tablet 0    melatonin 3 mg Take 1 tablet (3 mg total) by mouth daily at bedtime 30 tablet 1    polyethylene glycol (MIRALAX) 17 g packet Take 17 g by mouth daily      Zinc 50 MG TABS Take 50 mg by mouth daily      zinc sulfate (ZINCATE) 220 mg capsule Take 1 capsule (220 mg total) by mouth daily 30 capsule 1     No current facility-administered medications for this visit  Allergies   Allergen Reactions    Prednisone Delirium     Patient is not allergic but had had prednisone induced psychosis/treva    Adhesive [Medical Tape] Rash       Review of Systems    Video Exam    There were no vitals filed for this visit  Physical Exam       I spent FOUR GROUP HOURS PLUS CASE MANAGEMENT minutes with patient today in which greater than 50% of the time was spent in counseling/coordination of care regarding PHP - SEE NOTES      VIRTUAL VISIT DISCLAIMER    Iron Burch Lalita verbally agrees to participate in [employer]  Pt is aware that Virtual Care Services could be limited without vital signs or the ability to perform a full hands-on physical exam  Eyal Celis understands he or the provider may request at any time to terminate the video visit and request the patient to seek care or treatment in person

## 2021-07-19 NOTE — PSYCH
Virtual Regular Visit           Encounter provider Tanika Phan PA-C    Provider located at [address] [address] [phone]      Recent Visits  Date Type Provider Dept   07/12/21 Telemedicine Tanika Phan PA-C [facility] recent visits within past 7 days and meeting all other requirements  Future Appointments  No visits were found meeting these conditions  Showing future appointments within next 150 days and meeting all other requirements       The patient was identified by name and date of birth  Sher Mejiadoroteo was informed that this is a telemedicine visit and that the visit is being conducted throughMicrosoft Teams and patient was informed that this is a secure, HIPAA-compliant platform  He agrees to proceed     My office door was closed  No one else was in the room  He acknowledged consent and understanding of privacy and security of the video platform  The patient has agreed to participate and understands they can discontinue the visit at any time  Patient is aware this is a billable service  VIRTUAL VISIT DISCLAIMER    Sher Collins verbally agrees to participate in [employer]  Pt is aware that Virtual Care Services could be limited without vital signs or the ability to perform a full hands-on physical exam  Eyal Celis understands he or the provider may request at any time to terminate the video visit and request the patient to seek care or treatment in person  Progress Note - Behavioral Health   [facility], [facility]  Sher Collins 36 y o  male MRN: [id number]     Progress at partial program: unchanged  Jamal Cole seen today with his wife  Last seen by Agnie 7/12 at which time remeron stopped  aJmal Cole is no longer having vivid dreams and not as restless at night  Still with problems falling asleep    Has [facility] and has appt with sleep specialist on 7/23  Also has f/u with PCP tomorrow and neuro 7/26  Tamela Marte continues to note anxiety while in the car and apprehension up to an or more before leaving the house  Doesn't want to go anywhere by himself -can't say what he is afraid of- "I don't know"  Denies SI or paranoia            ROS:   As above otherwise negative    Active Ambulatory Problems     Diagnosis Date Noted    Benign essential HTN 06/22/2021    Anxiety     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    Chronic arterial ischemic stroke 01/15/2021    Concussion with no loss of consciousness 02/16/2018    Contact dermatitis and other eczema, due to unspecified cause 10/29/2013    Disorder of skin and subcutaneous tissue 12/03/2014    Elevated blood-pressure reading without diagnosis of hypertension 08/20/2014    H/O concussion 12/15/2020    Fall 02/01/2021    Hematochezia 02/16/2015    Hyperlipidemia 01/01/2021    Impaired memory 01/01/2021    Moderate obstructive sleep apnea 04/16/2019    Phthisis bulbi 09/16/2002    Pityriasis versicolor 06/21/2012    Syncope 12/31/2020    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[facility] [address] )      Resolved Ambulatory Problems     Diagnosis Date Noted    Medical clearance for psychiatric admission 06/22/2021     Past Medical History:   Diagnosis Date    Head injury     Hypertension      Allergies   Allergen Reactions    Prednisone Delirium     Patient is not allergic but had had prednisone induced psychosis/treva    Adhesive [Medical Tape] Rash          Mental Status Evaluation:    Appearance:  age appropriate, dressed appropriately   Behavior:  cooperative; minimally engaged   Speech:  normal rate and volume   Mood:  low   Affect:  constricted   Thought Process:  goal directed   Associations: intact associations   Thought Content:  no overt delusions   Perceptual Disturbances: none   Risk Potential: Suicidal ideation - None  Homicidal ideation - None   Sensorium:  oriented to person, place and time/date   Memory:  recent and remote memory grossly intact   Consciousness:  alert and awake   Attention: attention span and concentration are age appropriate   Insight:  fair   Judgment: intact   Gait/Station: unable to assess   Motor Activity: unable to assess today due to virtual visit       Laboratory results: I have personally reviewed all pertinent laboratory/tests results  Assessment   Diagnoses and all orders for this visit:    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[facility] [address] )    Severe major depression with psychotic features (HCC)  -     hydrOXYzine HCL (ATARAX) 25 mg tablet; 1-2 po q 6 hrs prn anxiety         Plan    Planned medication and treatment changes: no change:  Cont haldol 5 mg bid, atarax 25-50 mg q 6 hrs prn, cogentin 1 mg q bedtime  [id number] Celi Larios for d/c Wed  Max benefit obtained with [facility]  Psychiatric meds reconciled  OP provider(s) to determine return to work  All current active medications have been reviewed  Continue treatment with group therapy and partial program  Discharge planning      Risks / Benefits of Treatment:    Risks, benefits, and possible side effects of medications explained to patient and patient verbalizes understanding and agrees to medications  Counseling / Coordination of Care:    Patient's progress discussed with staff in treatment team meeting  Medications, treatment progress and treatment plan reviewed with patient      Jenny Mccoy PA-C 07/19/21

## 2021-07-19 NOTE — PSYCH
Subjective:     Patient ID: Ana Frederick is a 36 y o  male  [facility] Clinical Progress Notes      Specialized Services Documentation  Therapist must complete separate progress note for each specific clinical activity in which the individual participated during the day  Group Psychotherapy [id number]    This group was facilitated virtually in a private office using [employer] and Approved [employer] Teams  Ana Frederick consented to the use of tele-video modality of treatment  Psychotherapy group focused on setting and enforcing healthy boundaries  Emotional and physical boundaries were reviewed, and the group explored why boundary violation is a common issue  Ana Frederick shared that he struggles with his family members and setting boundaries because they don't agree with them  Feedback provided regarding not being in control of how others receive boundaries, and putting them into place for yourself  Ana Frederick explored ways to set personal boundaries  Personal bill of rights reviewed  Positive progress toward goals  Continue psychotherapy group to explore stressors and ways of coping  Tx Plan Objective: 1 1,1 2, Therapist:  WESLEY Richey      Other: ([id number]) LORENZO spoke to Ana Frederick and his wife Orlanod Uribe, for collateral session to discuss discharge plan as well as an update on progress  Ana Frederick still reports anxiety when being in a car for long periods of time, with worsening symptoms when he is driving himself  He expressed nervousness regarding a long distance appointment that he needs to attend this Thursday, and is worried about how he will do in the car for 1 5 hours to and from   shared grounding exercises via e-mail, for him to try before, during and after, as needed  Discussed follow up OP appointments  CM asked for confirmation of OP therapy intake appointment  Orlando Uribe agreed to assist in that  Discharge scheduled for this Wednesday   Juan David Letters Lalita denies SI, HI and psychosis  Case Management Note    Maki Barkley MSW    Current suicide risk : Low     Denies SI, plan or intent    Medications changes/added/denied? No    Treatment session number: 7    Individual Case Management Visit provided today?  Yes     [facility] follow up physician's orders: n/a

## 2021-07-19 NOTE — PSYCH
Subjective:     Patient ID: Isabella Benítez is a 36 y o  male  [facility] Clinical Progress Notes      Specialized Services Documentation  Therapist must complete separate progress note for each specific clinical activity in which the individual participated during the day  Allied Therapy   This group was facilitated virtually in a private office using [employer] and Approved [employer] Teams  Isabella Benítez consented to the use of tele-video modality of treatment  [address]  actively shared in [facility] group focused on DBT mindfulness strategies with focus on the what skills  Enrico Moore engaged in therapist led progressive muscle relaxation  Group explored practice of what skills through observing, describing and participating in a mei listening and then engaging in song  He identified ability to practice mindfulness through deep breathing today  Group discussed ways to apply to slowing down to make more effective choices  Some effort noted toward treatment goal   Continue AT to encourage the development and practice of mindfulness strategies to alleviate symptoms and support wellness        Tx Plan Objective: 1 1, Therapist:  Carole SMITH

## 2021-07-19 NOTE — PSYCH
Subjective:     Patient ID: Jeanette Hunt is a 36 y o  male  [facility] Clinical Progress Notes      Specialized Services Documentation  Therapist must complete separate progress note for each specific clinical activity in which the individual participated during the day  This was not shared due to this is a psychotherapy note  GROUP PSYCHOTHERAPY ([id number]) Group was facilitated virtually in a private office using HIPAA Compliant and Approved [employer] Teams  Darcie Mclaughlin consented to the use of tele-video modality of treatment and was virtually present for group psychotherapy today  The group engaged in education about challenging anxious thoughts  The group practiced by stating one common situation that provokes anxiety and then provided examples of worst outcome, best outcome, and likely outcome of the situation  Darcie Mclaughlin seemed fairly engaged throughout the group session  Darcie Mclaughlin stated that one situation currently making them feel anxious is falling asleep due to fearing sleep disturbances  Darcie Mclaughlin is encouraged to make progress towards goals and objectives through group participation and will continue to attend psychotherapy group  Tx plan objective: 1 1, 1 2   Therapist: Sarah Colvin MA    Education Therapy   [id number] Jeanette Hunt actively shared in morning assessment and goal review  Presented as Receptive related to readiness to learn  Jeanette Hunt was not here on last treatment day  did not present with any barriers to learning  [id number] Jeanette Hunt engaged throughout the treatment day  Was engaged in learning related to Illness, Aftercare and Wellness Tools  Staff utilized Verbal and A/V teaching methods  Jeanette Hunt shared area of learning and set a goal for outside of program to practice deep breathing before falling asleep        Tx Plan Objective: 1 4, Therapist:  Sarah Colvin MA

## 2021-07-20 ENCOUNTER — OFFICE VISIT (OUTPATIENT)
Dept: PSYCHOLOGY | Facility: CLINIC | Age: 41
End: 2021-07-20
Payer: COMMERCIAL

## 2021-07-20 DIAGNOSIS — F19.959 SUBSTANCE-INDUCED PSYCHOTIC DISORDER (HCC): Primary | ICD-10-CM

## 2021-07-20 PROCEDURE — S0201 PARTIAL HOSPITALIZATION SERV: HCPCS

## 2021-07-20 PROCEDURE — G0410 GRP PSYCH PARTIAL HOSP 45-50: HCPCS

## 2021-07-20 PROCEDURE — G0177 OPPS/PHP; TRAIN & EDUC SERV: HCPCS

## 2021-07-20 NOTE — PSYCH
Assessment/Plan:           Encounter Diagnoses   Name Primary?  Mood disorder ([facility] ) Yes    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[facility] )              Subjective:      Patient ID: Jennifer Freeman is a 36 y o  male      [facility] Treatment Plan   AREAS OF NEED: Mood instability as evidenced by; anger outbursts, excessive crying, shame, guilt and difficulty sleeping  Date Initiated: 07/02/2021     Strengths: Supportive family               LONG TERM GOAL:   Date Initiated: 07/02/2021  1 0 I will decrease my mood instability symptoms from baseline 7/10 to 3/10 by implementing emotional regulation skills learned in program  Target Date: 08/04/2021  Completion Date:         SHORT TERM OBJECTIVES:      Date Initiated: 07/02/2021  1 1 I will learn and implement three emotional regulation skills into my weekly routine to improve stability in moods  Revision Date: 07/20/2021  Target Date: 07/14/2021  Completion Date:      Date Initiated: 07/02/2021  1 2 I will identify two self-compassion skills to implement into my weekly routine to assist in my feelings of shame and blame and improve my overall outlook on myself    Revision Date: 07/20/2021  Target Date: 07/14/2021  Completion Date:      Date Initiated: 07/02/2021  1 3 I will take medications as prescribed and share questions and concerns if arise    Revision Date: 07/20/2021  Target Date: 07/14/2021  Completion Date:       Date Initiated: 07/02/2021  1 4 I will identify 3 ways my supports can assist in my recovery and agree to staff/support contact as indicated    Revision Date: 07/20/2021  Target Date: 07/14/2021  Completion Date:             7 DAY REVISION:     Date Initiated: 07/20/2021  1 5 I will identify 3 skills I need to continue to use to support my ongoing wellness as I prepare for discharge to OP level of care    Revision Date:   Target Date: 07/29/2021  Completion Date:     Date Initiated:  Revision Date:       Target Date:      Completion Date:          PSYCHIATRY:  Date Initiated: 07/02/2021     Medication management and education          Revision Date: 07/20/2021          The person(s) responsible for carrying out this plan is Dr Shaun Toth MD             NURSING/SYMPTOM EDUCATION:       Date Initiated: 07/02/2021          1 1, 1 2  1 3, 1 4 This RN/Or Therapist will provide wellness/symptoms and skill education groups three to five days weekly to educate Eyal Celis on signs and symptoms of diagnoses, skills to manage, and medication questions that will be addressed by the treatment team           1 1, 1 2  1 3, 1 4, 1 5 This RN/Or Therapist will continue to provide wellness/symptoms and skill education groups three to five days weekly to educate Eyal Celis on signs and symptoms of diagnoses, skills to manage, and medication questions that will be addressed by the treatment team            Revision date: 07/20/2021          The person(s) responsible for carrying out the plan is [employer], LSW        PSYCHOLOGY:   Date Initiated: 07/02/2021     1 1,1 2,1 4 Provide psychotherapy group five times per week to allow opportunity for Eyal Celis to explore stressors and ways of coping                   1 1,1 2,1 4, 1 5 Continue to provide psychotherapy group five times per week to allow opportunity for Eyal Celis to explore stressors and ways of coping         Revision Date: 07/20/2021         The person(s) responsible for carrying out the plan is Sri Vivar MA and Sarah Gonzalez        ALLIED THERAPY:   Date Initiated: 07/02/2021     1 1,1 2 Engage  in AT group five times weekly to encourage development and use of wellness tools to decrease symptoms and promote recovery through meaningful activity  1 1,1 2, 1 5 Continue to engage  in AT group five times weekly to encourage development and use of wellness tools to decrease symptoms and promote recovery                 through meaningful activity  Revision Date: 07/20/2021          The person(s) responsible for carrying out the plan is SARAH Dial        CASE MANAGEMENT:   Date Initiated: 07/02/2021         1 0 Continue to engage in individual case management 3-4 times weekly, to discuss and prepare for aftercare         services, discuss progress and other     community resources  UR contact as necessary            The person(s) responsible for carrying out the plan is BHAKTI Clayton     Revision Date: 07/20/2021              TREATMENT REVIEW/COMMENTS:      DISCHARGE CRITERIA: Identify 3 signs of progress and complete relapse prevention plan       DISCHARGE PLAN: Admit to [facility]     Group therapy, case management, medication management, UR and family contact as indicated    ELOS 10 treatment days  Refer to OP psychiatry and therapy        Estimated Length of Stay: 10 treatment days              Diagnosis and Treatment Plan explained to Eyal Birch relates understanding diagnosis and is agreeable to Treatment Plan             CLIENT COMMENTS / Please share your thoughts, feelings, need and/or experiences regarding your treatment plan: _____________________________________________________________________________________________________________________________________________________________________________________________________________________________________________________________________________________________________________________ Date/Time: ______________      Patient Signature: _________________________________      Date/Time: ______________       Signature: _________________________________          Date/Time: ______________

## 2021-07-20 NOTE — PSYCH
Virtual Regular Visit    Verification of patient location:    Patient is currently located in the state [facility]  Patient is currently located in a state in which I am licensed      Assessment/Plan:    Problem List Items Addressed This Visit        Other    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[facility] ) - Primary                  Reason for visit is PHP VIRTUAL GROUP DUE TO COVID-19      Encounter provider Monique David    Provider located at [address] [place] [address]  [address] [facility] [id number]      Recent Visits  Date Type Provider Dept   07/19/21 Office Visit [facility] PARTIAL PSYCH GROUP THERAPY [facility]   07/15/21 Office Visit [facility] PARTIAL PSYCH GROUP THERAPY [facility]   07/14/21 Office Visit [facility] PARTIAL PSYCH GROUP THERAPY [facility]   Showing recent visits within past 7 days and meeting all other requirements  Today's Visits  Date Type Provider Dept   07/20/21 Office Visit [facility] PARTIAL PSYCH GROUP THERAPY [facility]   Showing today's visits and meeting all other requirements  Future Appointments  No visits were found meeting these conditions  Showing future appointments within next 150 days and meeting all other requirements       The patient was identified by name and date of birth  Hank Veliz was informed that this is a telemedicine visit and that the visit is being conducted throughMicrosoft Teams and patient was informed that this is a secure, HIPAA-compliant platform  He agrees to proceed     My office door was closed  No one else was in the room  He acknowledged consent and understanding of privacy and security of the video platform  The patient has agreed to participate and understands they can discontinue the visit at any time  Patient is aware this is a billable service  Subjective  Hank Veliz is a 36 y o  male          HPI     Past Medical History:   Diagnosis Date    Anxiety     Head injury     Hypertension        Past Surgical History:   Procedure Laterality Date    CORNEAL TRANSPLANT      EYE SURGERY      TONSILLECTOMY         Current Outpatient Medications   Medication Sig Dispense Refill    ascorbic acid (VITAMIN C) 250 MG tablet Take 1 tablet (250 mg total) by mouth daily 30 tablet 1    aspirin (ECOTRIN LOW STRENGTH) 81 mg EC tablet Take 1 tablet (81 mg total) by mouth daily 30 tablet 1    atorvastatin (LIPITOR) 10 mg tablet Take 1 tablet (10 mg total) by mouth daily with dinner 30 tablet 1    benztropine (COGENTIN) 1 mg tablet Take 1 tablet (1 mg total) by mouth daily at bedtime 30 tablet 1    cholecalciferol (VITAMIN D3) 400 units tablet Take 1 tablet (400 Units total) by mouth daily 30 tablet 1    folic acid (FOLVITE) 1 mg tablet Take 1 tablet (1 mg total) by mouth daily 30 tablet 1    haloperidol (HALDOL) 5 mg tablet Take 1 tablet (5 mg total) by mouth 2 (two) times a day 60 tablet 1    hydrOXYzine HCL (ATARAX) 25 mg tablet 1-2 po q 6 hrs prn anxiety 50 tablet 0    melatonin 3 mg Take 1 tablet (3 mg total) by mouth daily at bedtime 30 tablet 1    polyethylene glycol (MIRALAX) 17 g packet Take 17 g by mouth daily      Zinc 50 MG TABS Take 50 mg by mouth daily      zinc sulfate (ZINCATE) 220 mg capsule Take 1 capsule (220 mg total) by mouth daily 30 capsule 1     No current facility-administered medications for this visit  Allergies   Allergen Reactions    Prednisone Delirium     Patient is not allergic but had had prednisone induced psychosis/treva    Adhesive [Medical Tape] Rash       Review of Systems    Video Exam    There were no vitals filed for this visit  Physical Exam     I spent FOUR GROUP HOURS PLUS CASE MANAGEMENT minutes with patient today in which greater than 50% of the time was spent in counseling/coordination of care regarding PHP - SEE NOTES        VIRTUAL VISIT DISCLAIMER    Kinjal Walker verbally agrees to participate in [employer]  Pt is aware that Virtual Care Services could be limited without vital signs or the ability to perform a full hands-on physical exam  Eyal Celis understands he or the provider may request at any time to terminate the video visit and request the patient to seek care or treatment in person
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
## 2021-07-20 NOTE — PSYCH
Subjective:     Patient ID: Wilmer Ghosh is a 36 y o  male  [facility] Clinical Progress Notes      Specialized Services Documentation  Therapist must complete separate progress note for each specific clinical activity in which the individual participated during the day  Group Psychotherapy 10:45-11:45am   Group was facilitated virtually in a private office using HIPAA Compliant and Approved [employer] Teams  Wilmer Ghosh consented to the use of tele-video modality of treatment and was virtually present for group psychotherapy today       Kat Lawrence participated in a group focused on self care  Group began with a brief check in and Kat Bravo shared feeling antsy and tired today and answered the question of what she has done for herself recently with driving for longer distances  The group discussed what it means to practice self care and different areas such as Physical, Psychological, Emotional, Spiritual, Personal and Professional Self care  The group discussed the importance of self care to overall well being  Kat Bravo  demonstrated insight by sharing her perceptions of self care  Kat Bravo will continue to work on his goals and participate in psychotherapy       Tx Plan Objective: 1 1,1 2, Lynette Al, SOLITARIO, LSW, co-led by Catalino Ford
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
## 2021-07-21 ENCOUNTER — OFFICE VISIT (OUTPATIENT)
Dept: PSYCHOLOGY | Facility: CLINIC | Age: 41
End: 2021-07-21
Payer: COMMERCIAL

## 2021-07-21 DIAGNOSIS — F19.959 SUBSTANCE-INDUCED PSYCHOTIC DISORDER (HCC): Primary | ICD-10-CM

## 2021-07-21 PROCEDURE — S0201 PARTIAL HOSPITALIZATION SERV: HCPCS

## 2021-07-21 PROCEDURE — G0177 OPPS/PHP; TRAIN & EDUC SERV: HCPCS

## 2021-07-21 PROCEDURE — G0176 OPPS/PHP;ACTIVITY THERAPY: HCPCS

## 2021-07-21 PROCEDURE — G0410 GRP PSYCH PARTIAL HOSP 45-50: HCPCS
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
## 2021-07-21 NOTE — PSYCH
Subjective:     Patient ID: William Zambrano is a 36 y o  male  [facility] Clinical Progress Notes      Specialized Services Documentation  Therapist must complete separate progress note for each specific clinical activity in which the individual participated during the day  GROUP PSYCHOTHERAPY ([id number]) Group was facilitated virtually in a private office using HIPAA Compliant and Approved [employer] Teams  William Zambrano consented to the use of tele-video modality of treatment and was virtually present for group psychotherapy today  Group members received a safe and non-judgmental space to discuss current stressors and received feedback from the group  Members are able to gain a different perspective and deeper understanding of past experiences and current events  Group discussions and themes involved personal disclosures, nervousness about discharging and what comes next and sharing positive community resources  Mihir Crespo expressed concerns about his long drive to his appointments after being discharged  Difficulty expressing questions that group could provide feedback on  Mihir Crespo continues to demonstrates limited insight and growth through verbal participation, offerings of support, encouragement and feedback to other group members during the open discussion time  Continue with discharge at the end of treatment day     TX Plan Objectives: 1 1, 1 2, 1 4   Therapist: Nasra Romero MA, Abdullahi

## 2021-07-21 NOTE — PSYCH
Assessment/Plan:              Encounter Diagnoses   Name Primary?  Mood disorder ([address] ) Yes    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HCC)              Subjective:      Patient ID: Eyal Celis is a 40 y o  male      [facility] Treatment Plan   AREAS OF NEED: Mood instability as evidenced by; anger outbursts, excessive crying, shame, guilt and difficulty sleeping  Date Initiated: 07/02/2021     Strengths: Supportive family               LONG TERM GOAL:   Date Initiated: 07/02/2021  1 0 I will decrease my mood instability symptoms from baseline 7/10 to 3/10 by implementing emotional regulation skills learned in program  Target Date: 08/04/2021  Completion Date: In progress        SHORT TERM OBJECTIVES:      Date Initiated: 07/02/2021  1 1 I will learn and implement three emotional regulation skills into my weekly routine to improve stability in moods  Revision Date: 07/20/2021  Target Date: 07/14/2021  Completion Date: 07/21/2021     Date Initiated: 07/02/2021  1 2 I will identify two self-compassion skills to implement into my weekly routine to assist in my feelings of shame and blame and improve my overall outlook on myself    Revision Date: 07/20/2021  Target Date: 07/14/2021  Completion Date: 07/21/2021     Date Initiated: 07/02/2021  1 3 I will take medications as prescribed and share questions and concerns if arise    Revision Date: 07/20/2021  Target Date: 07/14/2021  Completion Date:  07/21/2021     Date Initiated: 07/02/2021  1 4 I will identify 3 ways my supports can assist in my recovery and agree to staff/support contact as indicated    Revision Date: 07/20/2021  Target Date: 07/14/2021  Completion Date: 07/21/2021             7 DAY REVISION:     Date Initiated: 07/20/2021  1 5 I will identify 3 skills I need to continue to use to support my ongoing wellness as I prepare for discharge to  level of care    Revision Date:   Target Date: 07/29/2021  Completion Date: 07/21/2021     Date Initiated:  Revision Date:       Target Date:      Completion Date:            PSYCHIATRY:  Date Initiated: 07/02/2021     Medication management and education          Revision Date: 07/20/2021          The person(s) responsible for carrying out this plan is Dr Rena Mccormick MD             NURSING/SYMPTOM EDUCATION:       Date Initiated: 07/02/2021          1 1, 1 2  1 3, 1 4 This RN/Or Therapist will provide wellness/symptoms and skill education groups three to five days weekly to educate Eyal Celis on signs and symptoms of diagnoses, skills to manage, and medication questions that will be addressed by the treatment team           1 1, 1 2  1 3, 1 4, 1 5 This RN/Or Therapist will continue to provide wellness/symptoms and skill education groups three to five days weekly to educate Eyal Celis on signs and symptoms of diagnoses, skills to manage, and medication questions that will be addressed by the treatment team             Revision date: 07/20/2021          The person(s) responsible for carrying out the plan is [employer], LSW        PSYCHOLOGY:   Date Initiated: 07/02/2021     1 1,1 2,1 4 Provide psychotherapy group five times per week to allow opportunity for Eyal Celis to explore stressors and ways of coping                   1 1,1 2,1 4, 1 5 Continue to provide psychotherapy group five times per week to allow opportunity for Eyal Celis to explore stressors and ways of coping         Revision Date: 07/20/2021         The person(s) responsible for carrying out the plan is Sri Adams MA and Sarah Gonzalez        ALLIED THERAPY:   Date Initiated: 07/02/2021     1 1,1 2 Engage  in AT group five times weekly to encourage development and use of wellness tools to decrease symptoms and promote recovery through meaningful activity                   1 1,1 2, 1 5 Continue to engage  in AT group five times weekly to encourage development and use of wellness tools to decrease symptoms and promote recovery                 through meaningful activity                     Revision Date: 07/20/2021          The person(s) responsible for carrying out the plan is SARAH Dial        CASE MANAGEMENT:   Date Initiated: 07/02/2021         1 0 Continue to engage in individual case management 3-4 times weekly, to discuss and prepare for aftercare         services, discuss progress and other     community resources  UR contact as necessary            The person(s) responsible for carrying out the plan is BHAKTI Clayton     Revision Date: 07/20/2021              TREATMENT REVIEW/COMMENTS:      DISCHARGE CRITERIA: Identify 3 signs of progress and complete relapse prevention plan       DISCHARGE PLAN: Admit to [facility]     Group therapy, case management, medication management, UR and family contact as indicated    ELOS 10 treatment days  Refer to OP psychiatry and therapy        Estimated Length of Stay: 10 treatment days              Diagnosis and Treatment Plan explained to Eyal Birch relates understanding diagnosis and is agreeable to Treatment Plan             CLIENT COMMENTS / Please share your thoughts, feelings, need and/or experiences regarding your treatment plan: _____________________________________________________________________________________________________________________________________________________________________________________________________________________________________________________________________________________________________________________ Date/Time: ______________      Patient Signature: _________________________________      Date/Time: ______________       Signature: _________________________________          Date/Time: ______________

## 2021-07-21 NOTE — PSYCH
[facility] Discharge Instructions:     Disposition: home     Address: [address] [place] [id number]     Diagnosis:   Encounter Diagnosis   Name Primary?  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[address] ) Yes       Allergies (Drug/Food): Allergies   Allergen Reactions    Prednisone Delirium     Patient is not allergic but had had prednisone induced psychosis/treva    Adhesive [Medical Tape] Rash     Activity: No restrictions     Diet:no recommendations     Smoking Cessation: The best thing you can do to improve your health is to stop using tobacco     Diagnostic/Laboratory Orders: None    Vaccines: If you received a vaccine, please notify your family physician on your next visit  For more information, please call [phone]  Follow-up appointments/Referrals:   August 5th, 2021 @ 10:00a  [facility]  Dr Lauren Lafleur    August 5th, 2021 @ 3:30p  [facility]  OP therapy - therapist to be determined     ICM/CTT:None    [address] [facility]: [facility] [phone]  Intake/Referral/Evaluation (Non-Emergency) *NON INSURED FOR FUNDING: [facility]: [phone], [facility]: [phone], [facility]: [phone] and [place]: [phone]  Crisis Intervention (Emergency) [place] Service: [facility]: [phone], [place]: [phone], [place]: [phone], [facility]): [phone], [facility]: [phone] and [facility]: [phone]  _________________________________  National Crisis Intervention Hotline: 1-365.625.5503  National Suicide Crisis Hotline: 9-973.229.3055  I, the undersigned, have received and understand the above instructions          Patient/Rep Signature: __________________________________       Date/Time: ______________         Relationship: __________________________________________       Date/Time: ______________         Physician Signature: ____________________________________      Date/Time: ______________               Signature: ________________________________       Date/Time: ______________

## 2021-07-21 NOTE — PSYCH
Virtual Regular Visit    Verification of patient location:    Patient is currently located in the state of [place]  Patient is currently located in a state in which I am licensed      Assessment/Plan:    Problem List Items Addressed This Visit        Other    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[facility] [address] [address] ) - Primary               Reason for visit is PHP VIRTUAL GROUP DUE TO COVID-19      Encounter provider Monique David    Provider located at [address] [place] [address]  [address] [facility] [address]      Recent Visits  Date Type Provider Dept   07/20/21 Office Visit [address] PARTIAL PSYCH GROUP THERAPY [facility]   07/19/21 Office Visit [address] PARTIAL PSYCH GROUP THERAPY [facility]   07/15/21 Office Visit [address] PARTIAL PSYCH GROUP THERAPY [facility]   07/14/21 Office Visit [address] PARTIAL PSYCH GROUP THERAPY [facility]   Showing recent visits within past 7 days and meeting all other requirements  Today's Visits  Date Type Provider Dept   07/21/21 Office Visit [address] PARTIAL PSYCH GROUP THERAPY [facility]   Showing today's visits and meeting all other requirements  Future Appointments  No visits were found meeting these conditions  Showing future appointments within next 150 days and meeting all other requirements       The patient was identified by name and date of birth  Sher Collins was informed that this is a telemedicine visit and that the visit is being conducted throughMicrosoft Teams and patient was informed that this is a secure, HIPAA-compliant platform  He agrees to proceed     My office door was closed  No one else was in the room  He acknowledged consent and understanding of privacy and security of the video platform  The patient has agreed to participate and understands they can discontinue the visit at any time  Patient is aware this is a billable service       Bob Cole Alondra Garrett is a 36 y o  male    HPI     Past Medical History:   Diagnosis Date    Anxiety     Head injury     Hypertension        Past Surgical History:   Procedure Laterality Date    CORNEAL TRANSPLANT      EYE SURGERY      TONSILLECTOMY         Current Outpatient Medications   Medication Sig Dispense Refill    ascorbic acid (VITAMIN C) 250 MG tablet Take 1 tablet (250 mg total) by mouth daily 30 tablet 1    aspirin (ECOTRIN LOW STRENGTH) 81 mg EC tablet Take 1 tablet (81 mg total) by mouth daily 30 tablet 1    atorvastatin (LIPITOR) 10 mg tablet Take 1 tablet (10 mg total) by mouth daily with dinner 30 tablet 1    benztropine (COGENTIN) 1 mg tablet Take 1 tablet (1 mg total) by mouth daily at bedtime 30 tablet 1    cholecalciferol (VITAMIN D3) 400 units tablet Take 1 tablet (400 Units total) by mouth daily 30 tablet 1    folic acid (FOLVITE) 1 mg tablet Take 1 tablet (1 mg total) by mouth daily 30 tablet 1    haloperidol (HALDOL) 5 mg tablet Take 1 tablet (5 mg total) by mouth 2 (two) times a day 60 tablet 1    hydrOXYzine HCL (ATARAX) 25 mg tablet 1-2 po q 6 hrs prn anxiety 50 tablet 0    melatonin 3 mg Take 1 tablet (3 mg total) by mouth daily at bedtime 30 tablet 1    polyethylene glycol (MIRALAX) 17 g packet Take 17 g by mouth daily      Zinc 50 MG TABS Take 50 mg by mouth daily      zinc sulfate (ZINCATE) 220 mg capsule Take 1 capsule (220 mg total) by mouth daily 30 capsule 1     No current facility-administered medications for this visit  Allergies   Allergen Reactions    Prednisone Delirium     Patient is not allergic but had had prednisone induced psychosis/treva    Adhesive [Medical Tape] Rash       Review of Systems    Video Exam    There were no vitals filed for this visit      Physical Exam     I spent FOUR GROUP HOURS PLUS CASE MANAGEMENT minutes with patient today in which greater than 50% of the time was spent in counseling/coordination of care regarding PHP - SEE NOTES       VIRTUAL VISIT DISCLAIMER    Selma Renae verbally agrees to participate in [employer]  Pt is aware that Virtual Care Services could be limited without vital signs or the ability to perform a full hands-on physical exam  Eyal Celis understands he or the provider may request at any time to terminate the video visit and request the patient to seek care or treatment in person

## 2021-07-21 NOTE — PSYCH
Subjective:     Patient ID: Marleni Barillas is a 36 y o  male  [facility] Clinical Progress Notes      Specialized Services Documentation  Therapist must complete separate progress note for each specific clinical activity in which the individual participated during the day  Allied Therapy   This group was facilitated virtually in a private office using [employer] and [employer] Teams  Marleni Barillas consented to the use of tele-video modality of treatment  [id number]  Marleni Barillas actively  shared in [facility] group focused on boundary setting  Francisco Zendejas engaged in improvisation and discussion exploring value of and ways to set healthy limits with self and others  DBT strategy ERICA GUERRA introduced as skill to voice boundaries  He participated in practicing boundaries with given scenarios commenting on the one related to return to work being very helpful  Some positive  work toward goal noted  Discharge at the end of the treatment day          Tx Plan Objective: 1 1,1 4, Therapist:  Joel SMITH
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
## 2021-07-22 ENCOUNTER — APPOINTMENT (OUTPATIENT)
Dept: PSYCHOLOGY | Facility: CLINIC | Age: 41
End: 2021-07-22
Payer: COMMERCIAL

## 2021-07-23 ENCOUNTER — APPOINTMENT (OUTPATIENT)
Dept: PSYCHOLOGY | Facility: CLINIC | Age: 41
End: 2021-07-23
Payer: COMMERCIAL

## 2022-09-21 ENCOUNTER — OFFICE VISIT (OUTPATIENT)
Dept: FAMILY MEDICINE CLINIC | Facility: CLINIC | Age: 42
End: 2022-09-21
Payer: COMMERCIAL

## 2022-09-21 VITALS
OXYGEN SATURATION: 99 % | DIASTOLIC BLOOD PRESSURE: 74 MMHG | WEIGHT: 228.6 LBS | HEART RATE: 89 BPM | SYSTOLIC BLOOD PRESSURE: 118 MMHG | TEMPERATURE: 98.2 F | HEIGHT: 68 IN | BODY MASS INDEX: 34.65 KG/M2

## 2022-09-21 DIAGNOSIS — Z00.00 ENCOUNTER FOR MEDICAL EXAMINATION TO ESTABLISH CARE: ICD-10-CM

## 2022-09-21 DIAGNOSIS — Z00.00 ANNUAL PHYSICAL EXAM: Primary | ICD-10-CM

## 2022-09-21 DIAGNOSIS — Z13.0 SCREENING FOR DEFICIENCY ANEMIA: ICD-10-CM

## 2022-09-21 DIAGNOSIS — Z12.5 SCREENING FOR PROSTATE CANCER: ICD-10-CM

## 2022-09-21 DIAGNOSIS — Z11.59 NEED FOR HEPATITIS C SCREENING TEST: ICD-10-CM

## 2022-09-21 DIAGNOSIS — Z11.4 SCREENING FOR HIV (HUMAN IMMUNODEFICIENCY VIRUS): ICD-10-CM

## 2022-09-21 DIAGNOSIS — E66.9 OBESITY (BMI 30-39.9): ICD-10-CM

## 2022-09-21 DIAGNOSIS — E78.5 HYPERLIPIDEMIA, UNSPECIFIED HYPERLIPIDEMIA TYPE: ICD-10-CM

## 2022-09-21 DIAGNOSIS — I10 BENIGN ESSENTIAL HTN: ICD-10-CM

## 2022-09-21 PROCEDURE — 99386 PREV VISIT NEW AGE 40-64: CPT | Performed by: FAMILY MEDICINE

## 2022-09-21 RX ORDER — QUETIAPINE FUMARATE 200 MG/1
600 TABLET, FILM COATED ORAL
COMMUNITY

## 2022-09-21 RX ORDER — CLONAZEPAM 0.5 MG/1
0.5 TABLET ORAL 2 TIMES DAILY
COMMUNITY

## 2022-09-21 NOTE — PROGRESS NOTES
ADULT ANNUAL [address] [facility]    NAME: Leopoldo Escobar  AGE: 39 y o  SEX: male  : 1980      DATE: 2022     Assessment and Plan:     Problem List Items Addressed This Visit        Cardiovascular and Mediastinum    Benign essential HTN    Relevant Orders    Comprehensive metabolic panel       Other    Hyperlipidemia    Relevant Orders    Lipid Panel with Direct LDL reflex      Other Visit Diagnoses     Annual physical exam    -  Primary    Need for hepatitis C screening test        Relevant Orders    Hepatitis C Antibody ([facility])    Screening for HIV (human immunodeficiency virus)        Relevant Orders    HIV 1/2 Antigen/Antibody (4th Generation) w Reflex [facility]    Encounter for medical examination to establish care        Screening for deficiency anemia        Relevant Orders    CBC and differential    Obesity (BMI 30-39  9)        Relevant Orders    HEMOGLOBIN A1C W/ EAG ESTIMATION    TSH, 3rd generation with Free T4 reflex    Screening for prostate cancer        Relevant Orders    PSA, Total Screen        BMI Counseling: Body mass index is 34 76 kg/m²  The BMI is above normal  Nutrition recommendations include reducing portion sizes  Immunizations and preventive care screenings were discussed with patient today  Appropriate education was printed on patient's after visit summary  Discussed risks and benefits of prostate cancer screening  We discussed the controversial history of PSA screening for prostate cancer in the [place] as well as the risk of over detection and over treatment of prostate cancer by way of PSA screening  The patient understands that PSA blood testing is an imperfect way to screen for prostate cancer and that elevated PSA levels in the blood may also be caused by infection, inflammation, prostatic trauma or manipulation, urological procedures, or by benign prostatic enlargement      The role of the digital rectal examination in prostate cancer screening was also discussed and I discussed with him that there is large interobserver variability in the findings of digital rectal examination  Counseling:  Alcohol/drug use: discussed moderation in alcohol intake, the recommendations for healthy alcohol use, and avoidance of illicit drug use  Dental Health: discussed importance of regular tooth brushing, flossing, and dental visits  Injury prevention: discussed safety/seat belts, safety helmets, smoke detectors, carbon dioxide detectors, and smoking near bedding or upholstery  Sexual health: discussed sexually transmitted diseases, partner selection, use of condoms, avoidance of unintended pregnancy, and contraceptive alternatives  · Exercise: the importance of regular exercise/physical activity was discussed  Recommend exercise 3-5 times per week for at least 30 minutes  Return in 6 days (on 9/27/2022) for Lab Review  Chief Complaint:     Chief Complaint   Patient presents with    Establish Care      History of Present Illness:     Adult Annual Physical   Patient here for a comprehensive physical exam  The patient reports no problems  Diet and Physical Activity  · Diet/Nutrition: poor diet  · Exercise: no formal exercise  Depression Screening  PHQ-2/9 Depression Screening    Little interest or pleasure in doing things: 0 - not at all  Feeling down, depressed, or hopeless: 0 - not at all  PHQ-2 Score: 0  PHQ-2 Interpretation: Negative depression screen       General Health  · Sleep: gets 7-8 hours of sleep on average  · Hearing: normal - bilateral     · Vision: wears glasses  · Dental: no dental visits for >1 year   Health  · Symptoms include: none     Review of Systems:     Review of Systems   Constitutional: Positive for unexpected weight change  Negative for chills and fever  HENT: Negative for ear pain and sore throat      Eyes: Negative for pain and visual disturbance  Respiratory: Negative for cough and shortness of breath  Cardiovascular: Negative for chest pain and palpitations  Gastrointestinal: Negative for abdominal pain, nausea and vomiting  Genitourinary: Negative for dysuria and hematuria  Musculoskeletal: Negative for arthralgias and back pain  Skin: Negative for color change and rash  Neurological: Negative for seizures and syncope  Psychiatric/Behavioral: Negative for sleep disturbance  All other systems reviewed and are negative  Past Medical History:     Past Medical History:   Diagnosis Date    Anxiety     Head injury     Hypertension       Past Surgical History:     Past Surgical History:   Procedure Laterality Date    CORNEAL TRANSPLANT      EYE SURGERY      TONSILLECTOMY        Family History:     History reviewed  No pertinent family history     Social History:     Social History     Socioeconomic History    Marital status: /Civil Union     Spouse name: None    Number of children: 3    Years of education: None    Highest education level: Associate degree: occupational, technical, or vocational program   Occupational History     Employer: [address]   Tobacco Use    Smoking status: Former Smoker    Smokeless tobacco: Never Used   Vaping Use    Vaping Use: Never used   Substance and Sexual Activity    Alcohol use: Not Currently    Drug use: Never    Sexual activity: None   Other Topics Concern    None   Social History Narrative    None     Social Determinants of Health     Financial Resource Strain: Not on file   Food Insecurity: Not on file   Transportation Needs: Not on file   Physical Activity: Not on file   Stress: Not on file   Social Connections: Not on file   Intimate Partner Violence: Not on file   Housing Stability: Not on file      Current Medications:     Current Outpatient Medications   Medication Sig Dispense Refill    ascorbic acid (VITAMIN C) 250 MG tablet Take 1 tablet (250 mg total) by mouth daily 30 tablet 1    aspirin (ECOTRIN LOW STRENGTH) 81 mg EC tablet Take 1 tablet (81 mg total) by mouth daily 30 tablet 1    atorvastatin (LIPITOR) 10 mg tablet Take 1 tablet (10 mg total) by mouth daily with dinner (Patient taking differently: Take 40 mg by mouth daily with dinner) 30 tablet 1    cholecalciferol (VITAMIN D3) 400 units tablet Take 1 tablet (400 Units total) by mouth daily 30 tablet 1    clonazePAM (KlonoPIN) 0 5 mg tablet Take 0 5 mg by mouth 2 (two) times a day PRN      DULoxetine HCl (CYMBALTA PO) Take 40 mg by mouth in the morning      QUEtiapine (SEROquel) 200 mg tablet Take 200 mg by mouth daily at bedtime      Zinc 50 MG TABS Take 50 mg by mouth daily       No current facility-administered medications for this visit  Allergies: Allergies   Allergen Reactions    Prednisone Delirium     Patient is not allergic but had had prednisone induced psychosis/treva    Adhesive [Medical Tape] Rash      Physical Exam:     /74 (BP Location: Left arm, Patient Position: Sitting)   Pulse 89   Temp 98 2 °F (36 8 °C) (Tympanic)   Ht 5' 8" (1 727 m)   Wt 104 kg (228 lb 9 6 oz)   SpO2 99%   BMI 34 76 kg/m²     Physical Exam  Vitals and nursing note reviewed  Constitutional:       Appearance: He is well-developed  HENT:      Head: Normocephalic and atraumatic  Right Ear: Tympanic membrane, ear canal and external ear normal       Left Ear: Tympanic membrane, ear canal and external ear normal       Nose: Nose normal       Mouth/Throat:      Mouth: Mucous membranes are moist    Eyes:      General:         Right eye: No discharge  Left eye: No discharge  Extraocular Movements: Extraocular movements intact  Conjunctiva/sclera: Conjunctivae normal       Pupils: Pupils are equal, round, and reactive to light  Cardiovascular:      Rate and Rhythm: Normal rate and regular rhythm  Pulses: Normal pulses  Heart sounds: Normal heart sounds  No murmur heard  No friction rub  No gallop  Pulmonary:      Effort: Pulmonary effort is normal  No respiratory distress  Breath sounds: Normal breath sounds  Abdominal:      Palpations: Abdomen is soft  Tenderness: There is no abdominal tenderness  Musculoskeletal:         General: Normal range of motion  Cervical back: Normal range of motion and neck supple  Skin:     General: Skin is warm and dry  Neurological:      Mental Status: He is alert            Jelani Caruso MD  [address] [facility]

## 2022-09-21 NOTE — PATIENT INSTRUCTIONS

## 2022-09-22 ENCOUNTER — APPOINTMENT (OUTPATIENT)
Dept: LAB | Facility: CLINIC | Age: 42
End: 2022-09-22
Payer: COMMERCIAL

## 2022-09-22 DIAGNOSIS — Z11.4 SCREENING FOR HIV (HUMAN IMMUNODEFICIENCY VIRUS): ICD-10-CM

## 2022-09-22 DIAGNOSIS — E66.9 OBESITY (BMI 30-39.9): ICD-10-CM

## 2022-09-22 DIAGNOSIS — Z13.0 SCREENING FOR DEFICIENCY ANEMIA: ICD-10-CM

## 2022-09-22 DIAGNOSIS — Z11.59 NEED FOR HEPATITIS C SCREENING TEST: ICD-10-CM

## 2022-09-22 DIAGNOSIS — I10 BENIGN ESSENTIAL HTN: ICD-10-CM

## 2022-09-22 DIAGNOSIS — Z12.5 SCREENING FOR PROSTATE CANCER: ICD-10-CM

## 2022-09-22 DIAGNOSIS — E78.5 HYPERLIPIDEMIA, UNSPECIFIED HYPERLIPIDEMIA TYPE: ICD-10-CM

## 2022-09-22 LAB
ALBUMIN SERPL BCP-MCNC: 3.9 G/DL (ref 3.5–5)
ALP SERPL-CCNC: 97 U/L (ref 46–116)
ALT SERPL W P-5'-P-CCNC: 56 U/L (ref 12–78)
ANION GAP SERPL CALCULATED.3IONS-SCNC: 4 MMOL/L (ref 4–13)
AST SERPL W P-5'-P-CCNC: 35 U/L (ref 5–45)
BASOPHILS # BLD AUTO: 0.02 THOUSANDS/ΜL (ref 0–0.1)
BASOPHILS NFR BLD AUTO: 0 % (ref 0–1)
BILIRUB SERPL-MCNC: 0.79 MG/DL (ref 0.2–1)
BUN SERPL-MCNC: 12 MG/DL (ref 5–25)
CALCIUM SERPL-MCNC: 9.5 MG/DL (ref 8.3–10.1)
CHLORIDE SERPL-SCNC: 106 MMOL/L (ref 96–108)
CHOLEST SERPL-MCNC: 124 MG/DL
CO2 SERPL-SCNC: 27 MMOL/L (ref 21–32)
CREAT SERPL-MCNC: 1.25 MG/DL (ref 0.6–1.3)
EOSINOPHIL # BLD AUTO: 0.12 THOUSAND/ΜL (ref 0–0.61)
EOSINOPHIL NFR BLD AUTO: 2 % (ref 0–6)
ERYTHROCYTE [DISTWIDTH] IN BLOOD BY AUTOMATED COUNT: 12.4 % (ref 11.6–15.1)
EST. AVERAGE GLUCOSE BLD GHB EST-MCNC: 117 MG/DL
GFR SERPL CREATININE-BSD FRML MDRD: 71 ML/MIN/1.73SQ M
GLUCOSE P FAST SERPL-MCNC: 106 MG/DL (ref 65–99)
HBA1C MFR BLD: 5.7 %
HCT VFR BLD AUTO: 43.9 % (ref 36.5–49.3)
HCV AB SER QL: NORMAL
HDLC SERPL-MCNC: 33 MG/DL
HGB BLD-MCNC: 14.4 G/DL (ref 12–17)
IMM GRANULOCYTES # BLD AUTO: 0.02 THOUSAND/UL (ref 0–0.2)
IMM GRANULOCYTES NFR BLD AUTO: 0 % (ref 0–2)
LDLC SERPL CALC-MCNC: 60 MG/DL (ref 0–100)
LYMPHOCYTES # BLD AUTO: 2.07 THOUSANDS/ΜL (ref 0.6–4.47)
LYMPHOCYTES NFR BLD AUTO: 32 % (ref 14–44)
MCH RBC QN AUTO: 29.8 PG (ref 26.8–34.3)
MCHC RBC AUTO-ENTMCNC: 32.8 G/DL (ref 31.4–37.4)
MCV RBC AUTO: 91 FL (ref 82–98)
MONOCYTES # BLD AUTO: 0.58 THOUSAND/ΜL (ref 0.17–1.22)
MONOCYTES NFR BLD AUTO: 9 % (ref 4–12)
NEUTROPHILS # BLD AUTO: 3.6 THOUSANDS/ΜL (ref 1.85–7.62)
NEUTS SEG NFR BLD AUTO: 57 % (ref 43–75)
NRBC BLD AUTO-RTO: 0 /100 WBCS
PLATELET # BLD AUTO: 254 THOUSANDS/UL (ref 149–390)
PMV BLD AUTO: 9.5 FL (ref 8.9–12.7)
POTASSIUM SERPL-SCNC: 4.5 MMOL/L (ref 3.5–5.3)
PROT SERPL-MCNC: 7.6 G/DL (ref 6.4–8.4)
PSA SERPL-MCNC: 0.4 NG/ML (ref 0–4)
RBC # BLD AUTO: 4.84 MILLION/UL (ref 3.88–5.62)
SODIUM SERPL-SCNC: 137 MMOL/L (ref 135–147)
TRIGL SERPL-MCNC: 156 MG/DL
TSH SERPL DL<=0.05 MIU/L-ACNC: 1.19 UIU/ML (ref 0.45–4.5)
WBC # BLD AUTO: 6.41 THOUSAND/UL (ref 4.31–10.16)

## 2022-09-22 PROCEDURE — 80061 LIPID PANEL: CPT

## 2022-09-22 PROCEDURE — G0103 PSA SCREENING: HCPCS

## 2022-09-22 PROCEDURE — 36415 COLL VENOUS BLD VENIPUNCTURE: CPT

## 2022-09-22 PROCEDURE — 87389 HIV-1 AG W/HIV-1&-2 AB AG IA: CPT

## 2022-09-22 PROCEDURE — 85025 COMPLETE CBC W/AUTO DIFF WBC: CPT

## 2022-09-22 PROCEDURE — 80053 COMPREHEN METABOLIC PANEL: CPT

## 2022-09-22 PROCEDURE — 83036 HEMOGLOBIN GLYCOSYLATED A1C: CPT

## 2022-09-22 PROCEDURE — 84443 ASSAY THYROID STIM HORMONE: CPT

## 2022-09-22 PROCEDURE — 86803 HEPATITIS C AB TEST: CPT

## 2022-09-25 LAB — HIV 1+2 AB+HIV1 P24 AG SERPL QL IA: NORMAL

## 2022-09-27 ENCOUNTER — OFFICE VISIT (OUTPATIENT)
Dept: FAMILY MEDICINE CLINIC | Facility: CLINIC | Age: 42
End: 2022-09-27
Payer: COMMERCIAL

## 2022-09-27 VITALS
WEIGHT: 230.6 LBS | HEART RATE: 85 BPM | SYSTOLIC BLOOD PRESSURE: 116 MMHG | TEMPERATURE: 97.9 F | OXYGEN SATURATION: 98 % | DIASTOLIC BLOOD PRESSURE: 72 MMHG | HEIGHT: 68 IN | BODY MASS INDEX: 34.95 KG/M2

## 2022-09-27 DIAGNOSIS — L30.9 ECZEMA, UNSPECIFIED TYPE: ICD-10-CM

## 2022-09-27 DIAGNOSIS — E66.9 OBESITY (BMI 35.0-39.9 WITHOUT COMORBIDITY): ICD-10-CM

## 2022-09-27 DIAGNOSIS — I10 BENIGN ESSENTIAL HTN: Primary | ICD-10-CM

## 2022-09-27 DIAGNOSIS — L21.0 DANDRUFF IN ADULT: ICD-10-CM

## 2022-09-27 DIAGNOSIS — Z23 ENCOUNTER FOR IMMUNIZATION: ICD-10-CM

## 2022-09-27 PROCEDURE — 99213 OFFICE O/P EST LOW 20 MIN: CPT | Performed by: FAMILY MEDICINE

## 2022-09-27 RX ORDER — DIAPER,BRIEF,INFANT-TODD,DISP
EACH MISCELLANEOUS 4 TIMES DAILY PRN
Qty: 30 G | Refills: 0 | Status: SHIPPED | OUTPATIENT
Start: 2022-09-27

## 2022-09-27 RX ORDER — KETOCONAZOLE 20 MG/ML
1 SHAMPOO TOPICAL ONCE
Qty: 1 ML | Refills: 0 | Status: SHIPPED | OUTPATIENT
Start: 2022-09-27 | End: 2022-09-27

## 2022-09-27 NOTE — PATIENT INSTRUCTIONS
Purchase OTC Eucerin, apply twice daily  Eczema   WHAT YOU NEED TO KNOW:   Eczema is an itchy, red skin rash  You are more likely to have it if your parent or a family member has eczema, asthma, or hay fever  Eczema is a long-term condition  You may have flare-ups from time to time for the rest of your life  DISCHARGE INSTRUCTIONS:   Return to the emergency department if:   You develop a fever or have red streaks going up your arm or leg  Your rash gets more swollen, red, or hot  Call your doctor if:   Most of your skin is red, swollen, painful, and covered with scales  You develop bloody, red, painful crusts  Your skin blisters and oozes white or yellow pus  You have questions or concerns about your condition or care  Medicines:   Medicines may help reduce itching, redness, pain, and swelling  They may be given as a cream or pill  You may also receive antibiotics if you have a skin infection  Take your medicine as directed  Contact your healthcare provider if you think your medicine is not helping or if you have side effects  Tell him or her if you are allergic to any medicine  Keep a list of the medicines, vitamins, and herbs you take  Include the amounts, and when and why you take them  Bring the list or the pill bottles to follow-up visits  Carry your medicine list with you in case of an emergency  Care for your skin:   Do not scratch  Pat or press on your skin to relieve itching  Your symptoms will get worse if you scratch  Keep your fingernails short so you do not tear your skin if you do scratch  Keep your skin moist   Rub lotion, cream, or ointment into your skin at least 2 times a day  Ask your healthcare provider what to use and how often to use it  Take baths or showers  with warm water for 10 minutes or less  Use mild bar soap  Ask your healthcare provider for the best soap for you to use  Wear cotton clothes    Wear loose-fitting clothes made from cotton or cotton blends  Avoid wool  Use a humidifier  to add moisture to the air in your home  Avoid changes in temperature , especially activities that cause you to sweat a lot  Sweat can cause itching  Remove blankets from your bed if you get hot while you sleep  Avoid allergens, dust, and skin irritants  Do not use perfume, fabric softener, or makeup that burns or itches  Follow up with your doctor as directed:  Write down your questions so you remember to ask them during your visits  © Copyright [employer] 2022 Information is for End User's use only and may not be sold, redistributed or otherwise used for commercial purposes  All illustrations and images included in CareNotes® are the copyrighted property of [employer]  or [facility] Nicole Sherwood   The above information is an  only  It is not intended as medical advice for individual conditions or treatments  Talk to your doctor, nurse or pharmacist before following any medical regimen to see if it is safe and effective for you

## 2022-09-27 NOTE — PROGRESS NOTES
Assessment/Plan:      Diagnoses and all orders for this visit:    Benign essential HTN  Comments:  Continue to monitor  Encounter for immunization  -     TDAP VACCINE GREATER THAN OR EQUAL TO 6YO IM    Eczema, unspecified type  -     hydrocortisone 1 % cream; Apply topically 4 (four) times a day as needed for rash    Dandruff in adult  -     ketoconazole (NIZORAL) 2 % shampoo; Apply 1 application topically once for 1 dose    Obesity (BMI 35 0-39 9 without comorbidity)  -     Ambulatory Referral to Nutrition Services; Future          Subjective:     Patient ID: Ramesh Sales is a 39 y o  male  In office follow up for lab review  Also reports worsening eczema in his arms and trunk  His BP has been at goal and he is asymptomatic from this  He is interested in lifestyle interventions for weight loss  Review of Systems   Constitutional: Negative for chills and fever  HENT: Negative for ear pain and sore throat  Eyes: Negative for pain and visual disturbance  Respiratory: Negative for cough and shortness of breath  Cardiovascular: Negative for chest pain and palpitations  Gastrointestinal: Negative for abdominal pain and vomiting  Genitourinary: Negative for dysuria and hematuria  Musculoskeletal: Negative for arthralgias and back pain  Skin: Positive for rash  Negative for color change  Neurological: Negative for seizures and syncope  All other systems reviewed and are negative  Objective:     Physical Exam  Vitals reviewed  Constitutional:       Appearance: Normal appearance  HENT:      Head: Normocephalic and atraumatic  Nose: Nose normal       Mouth/Throat:      Mouth: Mucous membranes are moist       Pharynx: Oropharynx is clear  Eyes:      Extraocular Movements: Extraocular movements intact  Conjunctiva/sclera: Conjunctivae normal       Pupils: Pupils are equal, round, and reactive to light     Cardiovascular:      Rate and Rhythm: Normal rate and regular rhythm  Pulses: Normal pulses  Heart sounds: Normal heart sounds  No murmur heard  No friction rub  No gallop  Pulmonary:      Effort: Pulmonary effort is normal       Breath sounds: Normal breath sounds  No wheezing or rhonchi  Musculoskeletal:         General: Normal range of motion  Cervical back: Normal range of motion  Skin:     General: Skin is warm  Neurological:      General: No focal deficit present  Mental Status: He is alert and oriented to person, place, and time  Mental status is at baseline             Wt Readings from Last 3 Encounters:   09/27/22 105 kg (230 lb 9 6 oz)   09/21/22 104 kg (228 lb 9 6 oz)   06/22/21 93 kg (205 lb)     Temp Readings from Last 3 Encounters:   09/27/22 97 9 °F (36 6 °C) (Tympanic)   09/21/22 98 2 °F (36 8 °C) (Tympanic)   06/22/21 (!) 97 °F (36 1 °C) (Temporal)     BP Readings from Last 3 Encounters:   09/27/22 116/72   09/21/22 118/74   06/22/21 142/86     Pulse Readings from Last 3 Encounters:   09/27/22 85   09/21/22 89   06/22/21 95

## 2022-11-08 ENCOUNTER — CLINICAL SUPPORT (OUTPATIENT)
Dept: NUTRITION | Facility: HOSPITAL | Age: 42
End: 2022-11-08

## 2022-11-08 VITALS — HEIGHT: 69 IN | WEIGHT: 234.5 LBS | BODY MASS INDEX: 34.73 KG/M2

## 2022-11-08 DIAGNOSIS — E66.9 OBESITY (BMI 35.0-39.9 WITHOUT COMORBIDITY): ICD-10-CM

## 2022-11-08 NOTE — PROGRESS NOTES
Initial Nutrition Assessment Form    Patient Name: Amparo Carranza    YOB: 1980    Sex: Male     Assessment Date: 11/8/2022  Start Time: 1:30 pm Stop Time: 2:30 pm Total Minutes: 60     Data:  Present at session: Self, significant other, Rahul Hogan   Parent/Patient Concerns: Patient and SO concerned about weight gain and prediabetes  Medical Dx/Reason for Referral: E66 9 (ICD-10-CM) - Obesity (BMI 35 0-39 9 without comorbidity)   Past Medical History:   Diagnosis Date   • Anxiety    • Head injury    • Hypertension        Current Outpatient Medications   Medication Sig Dispense Refill   • ascorbic acid (VITAMIN C) 250 MG tablet Take 1 tablet (250 mg total) by mouth daily 30 tablet 1   • aspirin (ECOTRIN LOW STRENGTH) 81 mg EC tablet Take 1 tablet (81 mg total) by mouth daily 30 tablet 1   • atorvastatin (LIPITOR) 10 mg tablet Take 1 tablet (10 mg total) by mouth daily with dinner (Patient taking differently: Take 40 mg by mouth daily with dinner) 30 tablet 1   • cholecalciferol (VITAMIN D3) 400 units tablet Take 1 tablet (400 Units total) by mouth daily 30 tablet 1   • clonazePAM (KlonoPIN) 0 5 mg tablet Take 0 5 mg by mouth 2 (two) times a day PRN     • DULoxetine HCl (CYMBALTA PO) Take 40 mg by mouth in the morning     • hydrocortisone 1 % cream Apply topically 4 (four) times a day as needed for rash 30 g 0   • ketoconazole (NIZORAL) 2 % shampoo Apply 1 application topically once for 1 dose 1 mL 0   • QUEtiapine (SEROquel) 200 mg tablet Take 600 mg by mouth daily at bedtime Takes 3 200 mg tablets  at night     • Zinc 50 MG TABS Take 50 mg by mouth daily       No current facility-administered medications for this visit          Additional Meds/Supplements: Medications reviewed; SO states patient has gained significant amount of weight since starting on medication   Special Learning Needs: none   Height: HC Readings from Last 5 Encounters:   No data found for [facility]       Weight: Wt Readings from Last 10 Encounters:   09/27/22 105 kg (230 lb 9 6 oz)   09/21/22 104 kg (228 lb 9 6 oz)   06/22/21 93 kg (205 lb)   06/20/21 93 5 kg (206 lb 3 2 oz)   06/18/21 95 3 kg (210 lb)   06/15/21 95 3 kg (210 lb)     Estimated body mass index is 35 06 kg/m² as calculated from the following:    Height as of 9/27/22: 5' 8" (1 727 m)  Weight as of 9/27/22: 105 kg (230 lb 9 6 oz)  Recent Weight Change: [x]Yes     []No  Amount: 30# wt gain reported since 2021      Energy Needs: 3169-7215 calories/~260 g carbs/day (25-30 shar/kg IBW and 50% total calories from carbs @ 2100 calories)   Allergies   Allergen Reactions   • Prednisone Delirium     Patient is not allergic but had had prednisone induced psychosis/treva   • Adhesive [Medical Tape] Rash       Social History     Substance and Sexual Activity   Alcohol Use Not Currently       Social History     Tobacco Use   Smoking Status Former Smoker   Smokeless Tobacco Never Used       Who shops? Teodoro Champ   Who cooks? Te[employer]oro Champ   Exercise: When not working not much exercise but when working he walks ~10-15 miles per [employer]   Prior Counseling? []Yes     [x]No  When:      Why:         Diet Hx:  Breakfast: Diet:  Cereal, fruity elizabeth or frosted mini wheat or captain crunch with whole milk, water; if working will have protein shake (CIB), Lean Shake for [address]; occasionally will have cooked breakfast with eggs, hashbrown, biscuit   Lunch: Skips at time but if working will pack grapes, granola and protein bar; if at home may have leftovers, water         Dinner: Cooked meal which includes protein, starch (mostly FF) and vegetables (mostly corn or peas); Pizza at least 1 x week; croissant rolls if available,          Snacks:   HS - ice cream, sour patch kids or crustable but does not always have HS snack        Nutrition Diagnosis:   Overweight/obesity  related to Excess energy intake as evidenced by  BMI more than normative standard for age and sex (obesity-grade II 35-39  9)       Medical Nutrition Therapy Intervention:  [x]Individualized Meal Plan:  Weight reducing, modified carb diet reviewed [x]Understanding Lab Values:  Reviewed A1c and lipid panel   []Basic Pathophysiology of Disease []Food/Medication Interactions   []Food Diary [x]Exercise:  Stressed the importance of routine exercise; current standard is 150 minutes per week   [x]Lifestyle/Behavior Modification Techniques:  Encouraged patient to monitor portions and read food labels for serving size []Medication, Mechanism of Action   [x]Label Reading: reviewed food label for serving size, calories, total carbs and sodium []Self Blood Glucose Monitoring   [x]Weight/BMI Goals: Wt loss desired, ~1# per week until goal weight achieved [x]Other - Patient agreeable to goals established and will work on them  Other Notes/Assessment:       Comprehension: []Excellent  []Very Good  [x]Good  []Fair   []Poor    Receptivity: []Excellent  []Very Good  [x]Good  []Fair   []Poor    Expected Compliance: []Excellent  []Very Good  [x]Good  []Fair   []Poor        Goals:  1  Eliminate sugary cereals at breakfast; trial cheerios, corn flakes, oatmeal, raisin bran   2  Limit carb to 77 g per meal; 30 g at HS snack   3  Measure portions at meals       No follow-ups on file  RD contact information provided with email  Patient to work on goals and schedule follow up appointment as needed      Labs:  CMP  Lab Results   Component Value Date    K 4 5 09/22/2022     09/22/2022    CO2 27 09/22/2022    BUN 12 09/22/2022    CREATININE 1 25 09/22/2022    GLUF 106 (H) 09/22/2022    CALCIUM 9 5 09/22/2022    AST 35 09/22/2022    ALT 56 09/22/2022    ALKPHOS 97 09/22/2022    EGFR 71 09/22/2022       BMP  Lab Results   Component Value Date    CALCIUM 9 5 09/22/2022    K 4 5 09/22/2022    CO2 27 09/22/2022     09/22/2022    BUN 12 09/22/2022    CREATININE 1 25 09/22/2022       Lipids  No results found for: CHOL  Lab Results   Component Value Date    HDL 33 (L) 09/22/2022 Lab Results   Component Value Date    LDLCALC 60 09/22/2022     Lab Results   Component Value Date    TRIG 156 (H) 09/22/2022     No results found for: CHOLHDL    Hemoglobin A1C  Lab Results   Component Value Date    HGBA1C 5 7 (H) 09/22/2022       Fasting Glucose  Lab Results   Component Value Date    GLUF 106 (H) 09/22/2022       Insulin     Thyroid  No results found for: TSH, N0NNFLG, S9DLQIM, THYROIDAB    Hepatic Function Panel  Lab Results   Component Value Date    ALT 56 09/22/2022    AST 35 09/22/2022    ALKPHOS 97 09/22/2022       Celiac Disease Antibody Panel  No results found for: ENDOMYSIAL IGA, GLIADIN IGA, GLIADIN IGG, IGA, TISSUE TRANSGLUT AB, TTG IGA   Iron  No results found for: IRON, TIBC, FERRITIN         Ronda ValdezWeisman Children's Rehabilitation Hospital  [address]  [facility] [place] [address]

## 2023-01-16 DIAGNOSIS — E78.5 HLD (HYPERLIPIDEMIA): ICD-10-CM

## 2023-01-17 RX ORDER — ATORVASTATIN CALCIUM 10 MG/1
40 TABLET, FILM COATED ORAL
Qty: 30 TABLET | Refills: 4 | Status: SHIPPED | OUTPATIENT
Start: 2023-01-17 | End: 2023-03-18

## 2023-01-31 ENCOUNTER — TELEPHONE (OUTPATIENT)
Dept: FAMILY MEDICINE CLINIC | Facility: CLINIC | Age: 43
End: 2023-01-31

## 2023-01-31 ENCOUNTER — OFFICE VISIT (OUTPATIENT)
Dept: FAMILY MEDICINE CLINIC | Facility: CLINIC | Age: 43
End: 2023-01-31

## 2023-01-31 VITALS
BODY MASS INDEX: 36.31 KG/M2 | OXYGEN SATURATION: 98 % | WEIGHT: 239.6 LBS | SYSTOLIC BLOOD PRESSURE: 124 MMHG | DIASTOLIC BLOOD PRESSURE: 84 MMHG | HEART RATE: 94 BPM | HEIGHT: 68 IN | TEMPERATURE: 98.3 F

## 2023-01-31 DIAGNOSIS — Z87.820 HISTORY OF CONCUSSION: ICD-10-CM

## 2023-01-31 DIAGNOSIS — S06.9X0S BRAIN INJURY, WITHOUT LOSS OF CONSCIOUSNESS, SEQUELA (HCC): ICD-10-CM

## 2023-01-31 DIAGNOSIS — R41.3 MEMORY CHANGE: ICD-10-CM

## 2023-01-31 DIAGNOSIS — R51.9 NEW ONSET OF HEADACHES: Primary | ICD-10-CM

## 2023-01-31 DIAGNOSIS — F32.3 SEVERE MAJOR DEPRESSION WITH PSYCHOTIC FEATURES (HCC): ICD-10-CM

## 2023-01-31 RX ORDER — DONEPEZIL HYDROCHLORIDE 5 MG/1
5 TABLET, FILM COATED ORAL DAILY
COMMUNITY
Start: 2023-01-02

## 2023-01-31 NOTE — PROGRESS NOTES
Assessment/Plan:      Diagnoses and all orders for this visit:    New onset of headaches  Comments:  may be 2/2 to head trauma/concussion vs other headache tension  nsaids recommended and return if no improvement    Memory change  -     Ambulatory Referral to Neurology; Future    Brain injury, without loss of consciousness, sequela ([facility] )  Comments:  reports was told he was in a medically induced psychosis and since then he has never been the same cognitively  Orders:  -     Ambulatory Referral to Neurology; Future    History of concussion    Severe major depression with psychotic features ([facility] )  Comments:  follows with psychiatry but psychiatry wanted him evaluated by neuro    Other orders  -     donepezil (ARICEPT) 5 mg tablet; Take 5 mg by mouth daily          Return for Next scheduled follow up with pcp  The following portions of the patient's history were reviewed and updated as appropriate: allergies, current medications, past family history, past medical history, past social history, past surgical history, and problem list      Subjective:     Patient ID: Antonio Stanford is a 43 y o  male  HPI    Here with wife who provides a lot of the history    Headaches started two weeks ago  Reports he thinks he bumped his head on 1/13/2023  Reports has a history of 3-4 concussions since 2017  Reports was told he had a medical iduced psychosis and was told he could have issues with cognition  They would like to see a neurologist for this  Wife reports they were on a bus and he was not paying attention and bumped his head in the front  Hit his head on plastic  Reports headaches behind both eyes  Reports the headaches started after that  Reports he takes tylenol for the pain but reports he called off work twice since the headaches started  Reports only twice he had the headache since then  Reports 18th and then today  Reports tight band around the head  Reports sensitivity to lights and noises  No nausea or vomiting  Reports no vision changes  Started aricept less than 1 month ago via psych for memory issues  They have not yet noticed whether or not this is helping  PHQ-9 Depression Screening    Little interest or pleasure in doing things: 0 - not at all  Feeling down, depressed, or hopeless: 0 - not at all          Current Outpatient Medications on File Prior to Visit   Medication Sig Dispense Refill   • ascorbic acid (VITAMIN C) 250 MG tablet Take 1 tablet (250 mg total) by mouth daily 30 tablet 1   • aspirin (ECOTRIN LOW STRENGTH) 81 mg EC tablet Take 1 tablet (81 mg total) by mouth daily 30 tablet 1   • atorvastatin (LIPITOR) 10 mg tablet Take 4 tablets (40 mg total) by mouth daily with dinner 30 tablet 4   • cholecalciferol (VITAMIN D3) 400 units tablet Take 1 tablet (400 Units total) by mouth daily 30 tablet 1   • donepezil (ARICEPT) 5 mg tablet Take 5 mg by mouth daily     • ketoconazole (NIZORAL) 2 % shampoo Apply 1 application topically once for 1 dose 1 mL 0   • QUEtiapine (SEROquel) 200 mg tablet Take 600 mg by mouth daily at bedtime Takes 3 200 mg tablets  at night     • Zinc 50 MG TABS Take 50 mg by mouth daily     • clonazePAM (KlonoPIN) 0 5 mg tablet Take 0 5 mg by mouth 2 (two) times a day PRN (Patient not taking: Reported on 1/31/2023)     • [DISCONTINUED] DULoxetine HCl (CYMBALTA PO) Take 40 mg by mouth in the morning (Patient not taking: Reported on 1/31/2023)     • [DISCONTINUED] hydrocortisone 1 % cream Apply topically 4 (four) times a day as needed for rash (Patient not taking: Reported on 1/31/2023) 30 g 0     No current facility-administered medications on file prior to visit  Review of Systems      Objective:    Vitals:    01/31/23 1307   BP: 124/84   BP Location: Left arm   Patient Position: Sitting   Pulse: 94   Temp: 98 3 °F (36 8 °C)   TempSrc: Tympanic   SpO2: 98%   Weight: 109 kg (239 lb 9 6 oz)   Height: 5' 8" (1 727 m)         Physical Exam  Vitals and nursing note reviewed  Constitutional:       General: He is not in acute distress  Appearance: Normal appearance  He is not ill-appearing or toxic-appearing  HENT:      Head: Normocephalic and atraumatic  Comments: No nodules or lesions noted  No bruising seen     Nose: Nose normal       Mouth/Throat:      Mouth: Mucous membranes are moist       Pharynx: Oropharynx is clear  No oropharyngeal exudate or posterior oropharyngeal erythema  Eyes:      General: No scleral icterus  Extraocular Movements: Extraocular movements intact  Conjunctiva/sclera: Conjunctivae normal       Comments: Right eye injury, absent pupil, eyelids closed more than left   Cardiovascular:      Rate and Rhythm: Normal rate and regular rhythm  Heart sounds: Normal heart sounds  No murmur heard  No friction rub  No gallop  Pulmonary:      Effort: Pulmonary effort is normal  No respiratory distress  Breath sounds: Normal breath sounds  No wheezing or rales  Neurological:      General: No focal deficit present  Mental Status: He is alert  Cranial Nerves: No cranial nerve deficit  Sensory: No sensory deficit

## 2023-01-31 NOTE — TELEPHONE ENCOUNTER
Patient called and stated the way the medication was sent patient will not have enough for a 30-Day supply  Patient will have less than 10 days  Can it be re sent over as 40mg or with more tablets?

## 2023-02-01 ENCOUNTER — TELEPHONE (OUTPATIENT)
Dept: FAMILY MEDICINE CLINIC | Facility: CLINIC | Age: 43
End: 2023-02-01

## 2023-02-01 DIAGNOSIS — E78.5 HLD (HYPERLIPIDEMIA): ICD-10-CM

## 2023-02-01 RX ORDER — ATORVASTATIN CALCIUM 10 MG/1
40 TABLET, FILM COATED ORAL
Qty: 120 TABLET | Refills: 3 | Status: SHIPPED | OUTPATIENT
Start: 2023-02-01 | End: 2023-02-02

## 2023-02-01 NOTE — TELEPHONE ENCOUNTER
Patient is aware the note will only be extended for today  If no improvemnts going further he will need a F/U appt      Note made and sent to [facility]

## 2023-02-01 NOTE — LETTER
February 1, 2023     Patient: Lew Christianson  YOB: 1980  Date of Visit: 2/1/2023      To Whom it May Concern:    Lew Christianson is under my professional care  Reba Juanfreddy was seen in my office on 01/31/2023  Please excuse him from work on 01/31/2023, 02/01/2023  Rbea Mireles may return to work on 02/02/2023  If you have any questions or concerns, please don't hesitate to call           Sincerely,        Dr Tiffany Cohn

## 2023-02-01 NOTE — TELEPHONE ENCOUNTER
Patient called asking if he could have his work note extended for today  He states that his headache has not gone away  Please call him back at [phone] to let him know if it can be extended

## 2023-02-02 RX ORDER — ATORVASTATIN CALCIUM 10 MG/1
TABLET, FILM COATED ORAL
Qty: 120 TABLET | Refills: 3 | Status: SHIPPED | OUTPATIENT
Start: 2023-02-02 | End: 2023-02-03 | Stop reason: SDUPTHER

## 2023-02-03 DIAGNOSIS — E78.5 HLD (HYPERLIPIDEMIA): ICD-10-CM

## 2023-02-03 RX ORDER — ATORVASTATIN CALCIUM 40 MG/1
40 TABLET, FILM COATED ORAL DAILY
Qty: 90 TABLET | Refills: 3 | Status: SHIPPED | OUTPATIENT
Start: 2023-02-03

## 2023-04-01 ENCOUNTER — APPOINTMENT (OUTPATIENT)
Dept: RADIOLOGY | Facility: CLINIC | Age: 43
End: 2023-04-01

## 2023-04-01 ENCOUNTER — OFFICE VISIT (OUTPATIENT)
Dept: URGENT CARE | Facility: CLINIC | Age: 43
End: 2023-04-01

## 2023-04-01 VITALS
BODY MASS INDEX: 36.22 KG/M2 | DIASTOLIC BLOOD PRESSURE: 81 MMHG | OXYGEN SATURATION: 97 % | TEMPERATURE: 98.6 F | RESPIRATION RATE: 16 BRPM | HEIGHT: 68 IN | SYSTOLIC BLOOD PRESSURE: 121 MMHG | HEART RATE: 90 BPM | WEIGHT: 239 LBS

## 2023-04-01 DIAGNOSIS — L03.031 PARONYCHIA OF GREAT TOE OF RIGHT FOOT: Primary | ICD-10-CM

## 2023-04-01 DIAGNOSIS — S99.922A FOOT INJURY, LEFT, INITIAL ENCOUNTER: ICD-10-CM

## 2023-04-01 RX ORDER — CEPHALEXIN 500 MG/1
500 CAPSULE ORAL EVERY 12 HOURS SCHEDULED
Qty: 14 CAPSULE | Refills: 0 | Status: SHIPPED | OUTPATIENT
Start: 2023-04-01 | End: 2023-04-08

## 2023-04-01 RX ORDER — ASPIRIN 81 MG/1
81 TABLET, CHEWABLE ORAL DAILY
COMMUNITY
Start: 2023-01-28

## 2023-04-01 NOTE — PROGRESS NOTES
[facility]        NAME: Maryjo Hirsch is a 43 y o  male  : 1980    MRN: [id number]  DATE: 2023  TIME: 4:30 PM    Assessment and Plan   Paronychia of great toe of right foot [L03 031]  1  Paronychia of great toe of right foot  cephalexin (KEFLEX) 500 mg capsule    Ambulatory Referral to Podiatry      2  Foot injury, left, initial encounter  XR foot 3+ vw left    Ambulatory Referral to Podiatry        Xray- negative for obvious acute osseous abnormality, pending final read  Hard shoe- placed by medical staff for comfort, NV intact post-splinting    Patient Instructions     Take all antibiotics as prescribed  Warm soaks  Call PCP and Podiatry to schedule a follow-up appt in the next 1-2 days for reevaluation and to ensure resolution of symptoms  Go to the nearest ER for evaluation if any fevers, redness, warmth, discharge, streaking redness, redness that is circumferential, bleeding, pain, signs of infection, new or worsening symptoms, or other concerning symptoms  RICE- rest, ice, compression, elevation  Hard shoe for comfort  Call tomorrow for official xray results  Call Ortho/Podiatry today and follow-up with them in the next 1-2 days for further evaluation and treatment  Call Gypsy Dunham to schedule an appointment: [phone]  Or the direct Ortho number at [phone] to schedule an appointment   Go to the ER immediately if any numbness, tingling, weakness, change in intensity or location of pain, calf pain or swelling, other new or concerning symptoms     Chief Complaint     Chief Complaint   Patient presents with   • Ingrown Toenail     Ingrown toenail rite big toe started a week ago also Left foot is bruised in the arch area started a week ago          History of Present Illness       80-year-old male for evaluation of ingrown toenail on the right big toe as well as pain to the bottom of his left foot x1 week    Denies any injury or trauma to the area but states he did get a new pair of shoes  States he noticed a bruise to the bottom of the left foot-states the bruise has resolved  States he did try some Tylenol with some improvement  Pain to the bottom of the left foot is worse with walking or palpation, feels better at rest   He denies any redness, warmth, drainage, numbness, tingling, fevers, chills, weakness, swelling or other complaints  Review of Systems   Review of Systems   Constitutional: Negative for fever  Respiratory: Negative for shortness of breath  Cardiovascular: Negative for chest pain and leg swelling  Gastrointestinal: Negative for abdominal pain, nausea and vomiting  Musculoskeletal: Positive for arthralgias and joint swelling  Negative for neck pain and neck stiffness  Neurological: Negative for weakness, numbness and headaches  All other systems reviewed and are negative          Current Medications       Current Outpatient Medications:   •  aspirin 81 mg chewable tablet, Chew 81 mg daily Chew and swallow, Disp: , Rfl:   •  atorvastatin (LIPITOR) 40 mg tablet, Take 1 tablet (40 mg total) by mouth daily, Disp: 90 tablet, Rfl: 3  •  cephalexin (KEFLEX) 500 mg capsule, Take 1 capsule (500 mg total) by mouth every 12 (twelve) hours for 7 days, Disp: 14 capsule, Rfl: 0  •  donepezil (ARICEPT) 5 mg tablet, Take 5 mg by mouth daily, Disp: , Rfl:   •  QUEtiapine (SEROquel) 200 mg tablet, Take 600 mg by mouth daily at bedtime Takes 3 200 mg tablets  at night, Disp: , Rfl:   •  Zinc 50 MG TABS, Take 50 mg by mouth daily, Disp: , Rfl:   •  ascorbic acid (VITAMIN C) 250 MG tablet, Take 1 tablet (250 mg total) by mouth daily, Disp: 30 tablet, Rfl: 1  •  aspirin (ECOTRIN LOW STRENGTH) 81 mg EC tablet, Take 1 tablet (81 mg total) by mouth daily, Disp: 30 tablet, Rfl: 1  •  cholecalciferol (VITAMIN D3) 400 units tablet, Take 1 tablet (400 Units total) by mouth daily, Disp: 30 tablet, Rfl: 1  •  clonazePAM (KlonoPIN) 0 5 mg tablet, Take 0 5 "mg by mouth 2 (two) times a day PRN (Patient not taking: Reported on 1/31/2023), Disp: , Rfl:   •  ketoconazole (NIZORAL) 2 % shampoo, Apply 1 application topically once for 1 dose, Disp: 1 mL, Rfl: 0    Current Allergies     Allergies as of 04/01/2023 - Reviewed 04/01/2023   Allergen Reaction Noted   • Prednisone Delirium 07/02/2021   • Adhesive [medical tape] Rash 05/03/2015            The following portions of the patient's history were reviewed and updated as appropriate: allergies, current medications, past family history, past medical history, past social history, past surgical history and problem list      Past Medical History:   Diagnosis Date   • Anxiety    • Head injury    • Hypertension        Past Surgical History:   Procedure Laterality Date   • CORNEAL TRANSPLANT     • EYE SURGERY     • TONSILLECTOMY         History reviewed  No pertinent family history  Medications have been verified  Objective   /81   Pulse 90   Temp 98 6 °F (37 °C)   Resp 16   Ht 5' 8\" (1 727 m)   Wt 108 kg (239 lb)   SpO2 97%   BMI 36 34 kg/m²        Physical Exam     Physical Exam  Vitals and nursing note reviewed  Constitutional:       General: He is not in acute distress  Appearance: He is well-developed  Cardiovascular:      Rate and Rhythm: Normal rate and regular rhythm  Heart sounds: Normal heart sounds  Pulmonary:      Effort: Pulmonary effort is normal       Breath sounds: Normal breath sounds  No wheezing  Musculoskeletal:      Right ankle: Normal  No swelling  No tenderness  Normal range of motion  Left ankle: Normal  No swelling  No tenderness  Normal range of motion  Right foot: Normal range of motion and normal capillary refill  No swelling or tenderness  Normal pulse  Left foot: Normal range of motion and normal capillary refill  Tenderness present  No swelling  Normal pulse  Feet:       Comments: Able to wiggle toes, dorsi flexion/plantarflexion intact   " No erythema, warmth, ecchymoses or other abnormalities visualized  Neurological:      Mental Status: He is alert and oriented to person, place, and time  Deep Tendon Reflexes: Reflexes are normal and symmetric  Comments: NV intact with sensation and strong peripheral pulses   5/5 strength of LE bilaterally   Psychiatric:         Behavior: Behavior normal

## 2023-04-01 NOTE — PATIENT INSTRUCTIONS
Take all antibiotics as prescribed  Warm soaks  Call PCP and Podiatry to schedule a follow-up appt in the next 1-2 days for reevaluation and to ensure resolution of symptoms  Go to the nearest ER for evaluation if any fevers, redness, warmth, discharge, streaking redness, redness that is circumferential, bleeding, pain, signs of infection, new or worsening symptoms, or other concerning symptoms  RICE- rest, ice, compression, elevation  Hard shoe for comfort  Call tomorrow for official xray results  Call Ortho/Podiatry today and follow-up with them in the next 1-2 days for further evaluation and treatment     Call Teo Watts to schedule an appointment: [phone]  Or the direct Ortho number at [phone] to schedule an appointment   Go to the ER immediately if any numbness, tingling, weakness, change in intensity or location of pain, calf pain or swelling, other new or concerning symptoms

## 2023-05-01 ENCOUNTER — OFFICE VISIT (OUTPATIENT)
Dept: PODIATRY | Facility: CLINIC | Age: 43
End: 2023-05-01

## 2023-05-01 VITALS
SYSTOLIC BLOOD PRESSURE: 124 MMHG | HEIGHT: 68 IN | WEIGHT: 239 LBS | BODY MASS INDEX: 36.22 KG/M2 | HEART RATE: 103 BPM | DIASTOLIC BLOOD PRESSURE: 87 MMHG

## 2023-05-01 DIAGNOSIS — M79.674 GREAT TOE PAIN, RIGHT: Primary | ICD-10-CM

## 2023-05-01 NOTE — PROGRESS NOTES
Assessment/Plan:    No problem-specific Assessment & Plan notes found for this encounter  Diagnoses and all orders for this visit:    Great toe pain, right      Plan:       - Right hallux partial toenail avulsion site has completely healed  - discussed nail trimming techniques and signs of ingrown toenail reoccurrence  - return as needed      Subjective:      Patient ID: Ness Hamilton is a 43 y o  male  68-year-old male presents with wife for continued follow-up of right partial toenail avulsion  Patient reports she is doing well without any discomfort  No other complaints at this time  The following portions of the patient's history were reviewed and updated as appropriate:   He  has a past medical history of Anxiety, Head injury, Hypertension, and Ingrown toenail (4/12/2023)  He   Patient Active Problem List    Diagnosis Date Noted    Severe major depression with psychotic features ([facility] ) 01/31/2023    Anxiety     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[facility] )     Benign essential HTN 06/22/2021    Fall 02/01/2021    Chronic arterial ischemic stroke 01/15/2021    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    Hyperlipidemia 01/01/2021    Impaired memory 01/01/2021    Syncope 12/31/2020    H/O concussion 12/15/2020    Moderate obstructive sleep apnea 04/16/2019    Concussion with no loss of consciousness 02/16/2018    Hematochezia 02/16/2015    Disorder of skin and subcutaneous tissue 12/03/2014    Elevated blood-pressure reading without diagnosis of hypertension 08/20/2014    Contact dermatitis and other eczema, due to unspecified cause 10/29/2013    Pityriasis versicolor 06/21/2012    Phthisis bulbi 09/16/2002     He  has a past surgical history that includes Corneal transplant; Tonsillectomy; and Eye surgery       Review of Systems   All other systems reviewed and are negative          Objective:      /87 (BP Location: Left arm, Patient Position: Sitting, Cuff Size: Large)   Pulse 103   Ht "5' 8\" (1 727 m)   Wt 108 kg (239 lb)   BMI 36 34 kg/m²          Physical Exam  Vitals reviewed  Feet:      Comments: Right hallux partial toenail avulsion site has healed          "

## 2023-05-08 NOTE — TREATMENT TEAM
AM rounds: Calm and cooperative, polite  Not paranoid, no delusions  C/o headache and continues to utilize PRNs medications  Denies SI/HI/AVH  Attended some groups    Slept Excision Method: Elliptical

## 2023-09-01 ENCOUNTER — NURSE TRIAGE (OUTPATIENT)
Dept: OTHER | Facility: OTHER | Age: 43
End: 2023-09-01

## 2023-09-01 NOTE — TELEPHONE ENCOUNTER
Reason for Disposition  • [1] Prescription refill request for NON-ESSENTIAL medicine (i.e., no harm to patient if med not taken) AND [2] triager unable to refill per department policy    Answer Assessment - Initial Assessment Questions  1. NAME of MEDICATION: "What medicine are you calling about?"    Aspirin chewable 81 mg   2. QUESTION: "What is your question?" (e.g., medication refill, side effect)     Refill needed   3. PRESCRIBING HCP: "Who prescribed it?" Reason: if prescribed by specialist, call should be referred to that group. [address]   4. SYMPTOMS: "Do you have any symptoms?"      Denies   5.  SEVERITY: If symptoms are present, ask "Are they mild, moderate or severe?"     n/a    Protocols used: MEDICATION QUESTION CALL-ADULT-

## 2023-09-01 NOTE — TELEPHONE ENCOUNTER
Regarding: medication dispencing issue  ----- Message from Henri Coates sent at 9/1/2023  6:19 PM EDT -----  "I am calling about my prescription for aspirin 81 mg chewable tablet I need to have this filled today I am going out of town     Medication Refill Request     Name aspirin 81 mg chewable tablet  Dose/Frequency once a day  Quantity 90  Verified pharmacy   [ x]  Verified ordering Provider   [ x]  Does patient have enough for the next 3 days?  Yes [ ] No [ x]

## 2023-09-01 NOTE — TELEPHONE ENCOUNTER
Patient requesting ASA 81 MG chewable. Documentation indicate medication refilled by Urgent care provider, 1 [facility] provider and previously [facility] provider. Patient denies distress. Informed medication is OTC. Patient will like PCP to start prescribing medication when office is open. Patient will  medication at pharmacy store. No further questions.

## 2023-09-05 DIAGNOSIS — E78.5 HLD (HYPERLIPIDEMIA): ICD-10-CM

## 2023-09-12 RX ORDER — ASPIRIN 81 MG/1
81 TABLET, CHEWABLE ORAL DAILY
OUTPATIENT
Start: 2023-09-12

## 2023-10-31 ENCOUNTER — TELEPHONE (OUTPATIENT)
Dept: NEUROLOGY | Facility: CLINIC | Age: 43
End: 2023-10-31

## 2023-11-07 ENCOUNTER — CONSULT (OUTPATIENT)
Dept: NEUROLOGY | Facility: CLINIC | Age: 43
End: 2023-11-07
Payer: COMMERCIAL

## 2023-11-07 VITALS
SYSTOLIC BLOOD PRESSURE: 120 MMHG | HEART RATE: 110 BPM | RESPIRATION RATE: 18 BRPM | OXYGEN SATURATION: 98 % | TEMPERATURE: 98.7 F | DIASTOLIC BLOOD PRESSURE: 82 MMHG | HEIGHT: 68 IN | BODY MASS INDEX: 38.4 KG/M2 | WEIGHT: 253.4 LBS

## 2023-11-07 DIAGNOSIS — S06.9X0S BRAIN INJURY, WITHOUT LOSS OF CONSCIOUSNESS, SEQUELA (HCC): ICD-10-CM

## 2023-11-07 DIAGNOSIS — R41.3 MEMORY CHANGE: ICD-10-CM

## 2023-11-07 PROCEDURE — 99205 OFFICE O/P NEW HI 60 MIN: CPT | Performed by: PSYCHIATRY & NEUROLOGY
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
## 2023-11-07 NOTE — PATIENT INSTRUCTIONS
If you can't get into [facility] we have an affiliate caaled Dr. Ana Montiel for neuropsych testing   Blood work   Repeat MRI brain with and without
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
## 2023-11-15 ENCOUNTER — APPOINTMENT (OUTPATIENT)
Dept: LAB | Facility: CLINIC | Age: 43
End: 2023-11-15
Payer: COMMERCIAL

## 2023-11-15 DIAGNOSIS — S06.9X0S BRAIN INJURY, WITHOUT LOSS OF CONSCIOUSNESS, SEQUELA (HCC): ICD-10-CM

## 2023-11-15 DIAGNOSIS — R41.3 MEMORY CHANGE: ICD-10-CM

## 2023-11-15 LAB — VIT B12 SERPL-MCNC: 257 PG/ML (ref 180–914)

## 2023-11-15 PROCEDURE — 84425 ASSAY OF VITAMIN B-1: CPT

## 2023-11-15 PROCEDURE — 83825 ASSAY OF MERCURY: CPT

## 2023-11-15 PROCEDURE — 36415 COLL VENOUS BLD VENIPUNCTURE: CPT

## 2023-11-15 PROCEDURE — 82607 VITAMIN B-12: CPT

## 2023-11-15 PROCEDURE — 83655 ASSAY OF LEAD: CPT

## 2023-11-15 PROCEDURE — 82300 ASSAY OF CADMIUM: CPT

## 2023-11-15 PROCEDURE — 82175 ASSAY OF ARSENIC: CPT

## 2023-11-18 LAB — VIT B1 BLD-SCNC: 120.5 NMOL/L (ref 66.5–200)

## 2023-11-21 LAB
ARSENIC BLD-MCNC: 1 UG/L (ref 0–9)
CADMIUM BLD-MCNC: <0.5 UG/L (ref 0–1.2)
LEAD BLD-MCNC: <1 UG/DL (ref 0–3.4)
MERCURY BLD-MCNC: <1 UG/L (ref 0–14.9)

## 2023-11-29 ENCOUNTER — TELEPHONE (OUTPATIENT)
Dept: PSYCHIATRY | Facility: CLINIC | Age: 43
End: 2023-11-29

## 2023-11-29 NOTE — TELEPHONE ENCOUNTER
The patient contacted the office, requesting to schedule a neuro psych testing appointment. The writer informed the patient that he would notify the  of the call. The  will be in contact at her earliest convenience. The writer notified the patient of the wait list for neuropsychology NP appointments.

## 2023-12-04 ENCOUNTER — TELEPHONE (OUTPATIENT)
Dept: PSYCHIATRY | Facility: CLINIC | Age: 43
End: 2023-12-04

## 2023-12-04 NOTE — TELEPHONE ENCOUNTER
Spoke with pt regarding Neuropsychology referral, completed questionnaire, informed of the process, and of wait-list for scheduling. PT verbalized understood and okay with being placed on wait-list.     Forwarded to provider.

## 2023-12-22 ENCOUNTER — HOSPITAL ENCOUNTER (OUTPATIENT)
Dept: RADIOLOGY | Facility: HOSPITAL | Age: 43
Discharge: HOME | End: 2023-12-22

## 2023-12-22 ENCOUNTER — HOSPITAL ENCOUNTER (OUTPATIENT)
Dept: MRI IMAGING | Facility: HOSPITAL | Age: 43
End: 2023-12-22
Attending: PSYCHIATRY & NEUROLOGY
Payer: COMMERCIAL

## 2023-12-22 DIAGNOSIS — R41.3 MEMORY CHANGE: ICD-10-CM

## 2023-12-22 DIAGNOSIS — S06.9X0S BRAIN INJURY, WITHOUT LOSS OF CONSCIOUSNESS, SEQUELA (HCC): ICD-10-CM

## 2023-12-22 PROCEDURE — 70553 MRI BRAIN STEM W/O & W/DYE: CPT

## 2023-12-22 PROCEDURE — G1004 CDSM NDSC: HCPCS

## 2023-12-22 PROCEDURE — A9585 GADOBUTROL INJECTION: HCPCS | Performed by: PSYCHIATRY & NEUROLOGY

## 2023-12-22 RX ORDER — GADOBUTROL 604.72 MG/ML
11 INJECTION INTRAVENOUS
Status: COMPLETED | OUTPATIENT
Start: 2023-12-22 | End: 2023-12-22

## 2023-12-22 RX ADMIN — GADOBUTROL 11 ML: 604.72 INJECTION INTRAVENOUS at 13:28

## 2024-01-25 DIAGNOSIS — E78.5 HLD (HYPERLIPIDEMIA): ICD-10-CM

## 2024-01-25 RX ORDER — ATORVASTATIN CALCIUM 40 MG/1
40 TABLET, FILM COATED ORAL DAILY
Qty: 30 TABLET | Refills: 0 | Status: SHIPPED | OUTPATIENT
Start: 2024-01-25

## 2024-02-07 DIAGNOSIS — R41.3 IMPAIRED MEMORY: Primary | ICD-10-CM

## 2024-02-07 DIAGNOSIS — F32.3 SEVERE MAJOR DEPRESSION WITH PSYCHOTIC FEATURES (HCC): ICD-10-CM

## 2024-02-07 DIAGNOSIS — S06.0X0A CONCUSSION WITH NO LOSS OF CONSCIOUSNESS: ICD-10-CM

## 2024-02-12 ENCOUNTER — RA CDI HCC (OUTPATIENT)
Dept: OTHER | Facility: HOSPITAL | Age: 44
End: 2024-02-12

## 2024-02-12 PROBLEM — Z87.820 H/O CONCUSSION: Status: RESOLVED | Noted: 2020-12-15 | Resolved: 2024-02-12

## 2024-02-19 ENCOUNTER — OFFICE VISIT (OUTPATIENT)
Dept: FAMILY MEDICINE CLINIC | Facility: CLINIC | Age: 44
End: 2024-02-19
Payer: COMMERCIAL

## 2024-02-19 VITALS
SYSTOLIC BLOOD PRESSURE: 114 MMHG | BODY MASS INDEX: 36.68 KG/M2 | DIASTOLIC BLOOD PRESSURE: 88 MMHG | HEART RATE: 88 BPM | HEIGHT: 68 IN | TEMPERATURE: 98.9 F | WEIGHT: 242 LBS | OXYGEN SATURATION: 98 %

## 2024-02-19 DIAGNOSIS — I10 BENIGN ESSENTIAL HTN: Primary | ICD-10-CM

## 2024-02-19 DIAGNOSIS — Z13.31 NEGATIVE DEPRESSION SCREENING: ICD-10-CM

## 2024-02-19 DIAGNOSIS — E78.5 HLD (HYPERLIPIDEMIA): ICD-10-CM

## 2024-02-19 DIAGNOSIS — E78.5 HYPERLIPIDEMIA, UNSPECIFIED HYPERLIPIDEMIA TYPE: ICD-10-CM

## 2024-02-19 DIAGNOSIS — F32.3 SEVERE MAJOR DEPRESSION WITH PSYCHOTIC FEATURES (HCC): ICD-10-CM

## 2024-02-19 DIAGNOSIS — Z23 ENCOUNTER FOR IMMUNIZATION: ICD-10-CM

## 2024-02-19 PROCEDURE — 90715 TDAP VACCINE 7 YRS/> IM: CPT

## 2024-02-19 PROCEDURE — 90471 IMMUNIZATION ADMIN: CPT

## 2024-02-19 PROCEDURE — 99213 OFFICE O/P EST LOW 20 MIN: CPT | Performed by: FAMILY MEDICINE

## 2024-02-19 RX ORDER — ATORVASTATIN CALCIUM 40 MG/1
40 TABLET, FILM COATED ORAL DAILY
Qty: 30 TABLET | Refills: 0 | Status: SHIPPED | OUTPATIENT
Start: 2024-02-19

## 2024-02-19 NOTE — PROGRESS NOTES
Assessment/Plan:    No problem-specific Assessment & Plan notes found for this encounter.       Diagnoses and all orders for this visit:    Benign essential HTN  -     CBC and differential; Future  -     TSH, 3rd generation with Free T4 reflex; Future    Hyperlipidemia, unspecified hyperlipidemia type  -     Comprehensive metabolic panel; Future  -     Lipid panel; Future    Encounter for immunization  -     TDAP VACCINE GREATER THAN OR EQUAL TO 6YO IM  -     influenza vaccine, quadrivalent, 0.5 mL, preservative-free, for adult and pediatric patients 6 mos+ (AFLURIA, FLUARIX, FLULAVAL, FLUZONE)    Negative depression screening    HLD (hyperlipidemia)  -     atorvastatin (LIPITOR) 40 mg tablet; Take 1 tablet (40 mg total) by mouth daily    Severe major depression with psychotic features (HCC)          PHQ-2/9 Depression Screening    Little interest or pleasure in doing things: 0 - not at all  Feeling down, depressed, or hopeless: 0 - not at all  Trouble falling or staying asleep, or sleeping too much: 0 - not at all  Feeling tired or having little energy: 0 - not at all  Poor appetite or overeatin - not at all  Feeling bad about yourself - or that you are a failure or have let yourself or your family down: 0 - not at all  Trouble concentrating on things, such as reading the newspaper or watching television: 0 - not at all  Moving or speaking so slowly that other people could have noticed. Or the opposite - being so fidgety or restless that you have been moving around a lot more than usual: 0 - not at all  Thoughts that you would be better off dead, or of hurting yourself in some way: 0 - not at all  PHQ-9 Score: 0  PHQ-9 Interpretation: No or Minimal depression            Subjective:      Patient ID: Eyal Celis is a 43 y.o. male.    Hyperlipidemia  This is a chronic problem. The problem is controlled. Pertinent negatives include no chest pain or shortness of breath. Compliance problems include adherence to  "diet and adherence to exercise.        The following portions of the patient's history were reviewed and updated as appropriate: allergies, current medications, past family history, past medical history, past social history, past surgical history, and problem list.    Review of Systems   Respiratory:  Negative for shortness of breath and wheezing.    Cardiovascular:  Negative for chest pain and palpitations.         Objective:    /88   Pulse 88   Temp 98.9 °F (37.2 °C)   Ht 5' 8\" (1.727 m)   Wt 110 kg (242 lb)   SpO2 98%   BMI 36.80 kg/m²      Physical Exam  Vitals and nursing note reviewed.   Constitutional:       General: He is not in acute distress.     Appearance: Normal appearance. He is not ill-appearing or diaphoretic.   HENT:      Head: Normocephalic and atraumatic.   Eyes:      Conjunctiva/sclera: Conjunctivae normal.   Cardiovascular:      Rate and Rhythm: Normal rate and regular rhythm.      Heart sounds: Normal heart sounds. No murmur heard.  Pulmonary:      Effort: Pulmonary effort is normal. No respiratory distress.      Breath sounds: Normal breath sounds. No wheezing, rhonchi or rales.   Abdominal:      General: There is no distension.      Palpations: Abdomen is soft. There is no mass.      Tenderness: There is no abdominal tenderness. There is no guarding or rebound.   Musculoskeletal:      Cervical back: Normal range of motion and neck supple. No rigidity.      Right lower leg: No edema.      Left lower leg: No edema.   Lymphadenopathy:      Cervical: No cervical adenopathy.   Neurological:      Mental Status: He is alert and oriented to person, place, and time.   Psychiatric:         Mood and Affect: Mood normal.         Behavior: Behavior normal.         Thought Content: Thought content normal.         Judgment: Judgment normal.         "

## 2024-02-20 ENCOUNTER — APPOINTMENT (OUTPATIENT)
Dept: LAB | Facility: CLINIC | Age: 44
End: 2024-02-20
Payer: COMMERCIAL

## 2024-02-20 DIAGNOSIS — E78.5 HYPERLIPIDEMIA, UNSPECIFIED HYPERLIPIDEMIA TYPE: ICD-10-CM

## 2024-02-20 DIAGNOSIS — I10 BENIGN ESSENTIAL HTN: ICD-10-CM

## 2024-02-20 LAB
ALBUMIN SERPL BCP-MCNC: 4.6 G/DL (ref 3.5–5)
ALP SERPL-CCNC: 77 U/L (ref 34–104)
ALT SERPL W P-5'-P-CCNC: 95 U/L (ref 7–52)
ANION GAP SERPL CALCULATED.3IONS-SCNC: 8 MMOL/L
AST SERPL W P-5'-P-CCNC: 57 U/L (ref 13–39)
BASOPHILS # BLD AUTO: 0.04 THOUSANDS/ÂΜL (ref 0–0.1)
BASOPHILS NFR BLD AUTO: 1 % (ref 0–1)
BILIRUB SERPL-MCNC: 0.92 MG/DL (ref 0.2–1)
BUN SERPL-MCNC: 12 MG/DL (ref 5–25)
CALCIUM SERPL-MCNC: 8.9 MG/DL (ref 8.4–10.2)
CHLORIDE SERPL-SCNC: 103 MMOL/L (ref 96–108)
CHOLEST SERPL-MCNC: 139 MG/DL
CO2 SERPL-SCNC: 28 MMOL/L (ref 21–32)
CREAT SERPL-MCNC: 1 MG/DL (ref 0.6–1.3)
EOSINOPHIL # BLD AUTO: 0.12 THOUSAND/ÂΜL (ref 0–0.61)
EOSINOPHIL NFR BLD AUTO: 2 % (ref 0–6)
ERYTHROCYTE [DISTWIDTH] IN BLOOD BY AUTOMATED COUNT: 13.3 % (ref 11.6–15.1)
GFR SERPL CREATININE-BSD FRML MDRD: 91 ML/MIN/1.73SQ M
GLUCOSE P FAST SERPL-MCNC: 105 MG/DL (ref 65–99)
HCT VFR BLD AUTO: 48.2 % (ref 36.5–49.3)
HDLC SERPL-MCNC: 40 MG/DL
HGB BLD-MCNC: 15.1 G/DL (ref 12–17)
IMM GRANULOCYTES # BLD AUTO: 0.03 THOUSAND/UL (ref 0–0.2)
IMM GRANULOCYTES NFR BLD AUTO: 0 % (ref 0–2)
LDLC SERPL CALC-MCNC: 62 MG/DL (ref 0–100)
LYMPHOCYTES # BLD AUTO: 1.89 THOUSANDS/ÂΜL (ref 0.6–4.47)
LYMPHOCYTES NFR BLD AUTO: 26 % (ref 14–44)
MCH RBC QN AUTO: 30 PG (ref 26.8–34.3)
MCHC RBC AUTO-ENTMCNC: 31.3 G/DL (ref 31.4–37.4)
MCV RBC AUTO: 96 FL (ref 82–98)
MONOCYTES # BLD AUTO: 0.67 THOUSAND/ÂΜL (ref 0.17–1.22)
MONOCYTES NFR BLD AUTO: 9 % (ref 4–12)
NEUTROPHILS # BLD AUTO: 4.48 THOUSANDS/ÂΜL (ref 1.85–7.62)
NEUTS SEG NFR BLD AUTO: 62 % (ref 43–75)
NONHDLC SERPL-MCNC: 99 MG/DL
NRBC BLD AUTO-RTO: 0 /100 WBCS
PLATELET # BLD AUTO: 254 THOUSANDS/UL (ref 149–390)
PMV BLD AUTO: 9.7 FL (ref 8.9–12.7)
POTASSIUM SERPL-SCNC: 4.2 MMOL/L (ref 3.5–5.3)
PROT SERPL-MCNC: 7 G/DL (ref 6.4–8.4)
RBC # BLD AUTO: 5.03 MILLION/UL (ref 3.88–5.62)
SODIUM SERPL-SCNC: 139 MMOL/L (ref 135–147)
TRIGL SERPL-MCNC: 184 MG/DL
TSH SERPL DL<=0.05 MIU/L-ACNC: 1.04 UIU/ML (ref 0.45–4.5)
WBC # BLD AUTO: 7.23 THOUSAND/UL (ref 4.31–10.16)

## 2024-02-20 PROCEDURE — 80053 COMPREHEN METABOLIC PANEL: CPT

## 2024-02-20 PROCEDURE — 85025 COMPLETE CBC W/AUTO DIFF WBC: CPT

## 2024-02-20 PROCEDURE — 80061 LIPID PANEL: CPT

## 2024-02-20 PROCEDURE — 84443 ASSAY THYROID STIM HORMONE: CPT

## 2024-02-20 PROCEDURE — 36415 COLL VENOUS BLD VENIPUNCTURE: CPT

## 2024-02-27 ENCOUNTER — OFFICE VISIT (OUTPATIENT)
Dept: FAMILY MEDICINE CLINIC | Facility: CLINIC | Age: 44
End: 2024-02-27
Payer: COMMERCIAL

## 2024-02-27 VITALS
HEART RATE: 105 BPM | WEIGHT: 238.8 LBS | SYSTOLIC BLOOD PRESSURE: 110 MMHG | OXYGEN SATURATION: 96 % | BODY MASS INDEX: 36.19 KG/M2 | HEIGHT: 68 IN | DIASTOLIC BLOOD PRESSURE: 82 MMHG | TEMPERATURE: 97.4 F

## 2024-02-27 DIAGNOSIS — F19.959 SUBSTANCE-INDUCED PSYCHOTIC DISORDER (HCC): ICD-10-CM

## 2024-02-27 DIAGNOSIS — R41.3 IMPAIRED MEMORY: ICD-10-CM

## 2024-02-27 DIAGNOSIS — R74.01 TRANSAMINITIS: ICD-10-CM

## 2024-02-27 DIAGNOSIS — Z12.5 SCREENING FOR PROSTATE CANCER: ICD-10-CM

## 2024-02-27 DIAGNOSIS — F41.9 ANXIETY: ICD-10-CM

## 2024-02-27 DIAGNOSIS — I10 BENIGN ESSENTIAL HTN: ICD-10-CM

## 2024-02-27 DIAGNOSIS — R73.03 PREDIABETES: ICD-10-CM

## 2024-02-27 DIAGNOSIS — E66.09 CLASS 2 OBESITY DUE TO EXCESS CALORIES WITHOUT SERIOUS COMORBIDITY WITH BODY MASS INDEX (BMI) OF 36.0 TO 36.9 IN ADULT: ICD-10-CM

## 2024-02-27 DIAGNOSIS — Z23 ENCOUNTER FOR IMMUNIZATION: ICD-10-CM

## 2024-02-27 DIAGNOSIS — Z00.00 ANNUAL PHYSICAL EXAM: Primary | ICD-10-CM

## 2024-02-27 DIAGNOSIS — G47.33 MODERATE OBSTRUCTIVE SLEEP APNEA: ICD-10-CM

## 2024-02-27 PROCEDURE — 99396 PREV VISIT EST AGE 40-64: CPT

## 2024-02-27 NOTE — PATIENT INSTRUCTIONS

## 2024-02-27 NOTE — PROGRESS NOTES
ADULT ANNUAL PHYSICAL  [facility]    NAME: Eyal Celis  AGE: 43 y.o. SEX: male  : 1980     DATE: 2024     Assessment and Plan:     Problem List Items Addressed This Visit       Benign essential HTN  BP stable, not on any medication  Continue to monitor     Anxiety  Patient following psychiatry once a month  Currently on Klonopin 0.5 mg twice daily, quetiapine 600 mg at night  Awaiting neuropsych intake     Impaired memory  Following neurology  Awaiting neuropsych intake   Latest MOCA- , Aricept discontinued per neurology recommendations    Moderate obstructive sleep apnea  Wears a CPAP at at bedtime    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HCC)  In  patient was started on prednisone and Cymbalta which induced a psychosis.  Patient following psychiatry once a month  Maintanained on Seroquel, Klonopin      Other Visit Diagnoses       Annual physical exam    -  Primary    Encounter for immunization        Declined flu shot    Class 2 obesity due to excess calories without serious comorbidity with body mass index (BMI) of 36.0 to 36.9 in adult        Counseled on healthy diet and exercise  Declined referral to nutritionist at this time  Patient lately losing weight, continue to monitor  Follow-up in 6 months    Prediabetes        Fasting blood glucose noted to be in the prediabetic range   Counseled on healthy diet and exercise  We will check HbA1c    Relevant Orders    Hemoglobin A1C    Transaminitis        Transaminitis noted on CMP   Reports being a chronic alcohol user in the past, now more social  Check acute hepatitis panel  Recheck CMP in 6 months    Relevant Orders    Acute Hepatitis Panel (Refl)    Comprehensive metabolic panel    Screening for prostate cancer        Relevant Orders    PSA, Total Screen            Immunizations and preventive care screenings were discussed with patient today. Appropriate education was  printed on patient's after visit summary.    Discussed risks and benefits of prostate cancer screening. We discussed the controversial history of PSA screening for prostate cancer in the [place] as well as the risk of over detection and over treatment of prostate cancer by way of PSA screening.  The patient understands that PSA blood testing is an imperfect way to screen for prostate cancer and that elevated PSA levels in the blood may also be caused by infection, inflammation, prostatic trauma or manipulation, urological procedures, or by benign prostatic enlargement.    The role of the digital rectal examination in prostate cancer screening was also discussed and I discussed with him that there is large interobserver variability in the findings of digital rectal examination.    Counseling:  Alcohol/drug use: discussed moderation in alcohol intake, the recommendations for healthy alcohol use, and avoidance of illicit drug use.  Dental Health: discussed importance of regular tooth brushing, flossing, and dental visits.  Sexual health: discussed sexually transmitted diseases, partner selection, use of condoms, avoidance of unintended pregnancy, and contraceptive alternatives.  Exercise: the importance of regular exercise/physical activity was discussed. Recommend exercise 3-5 times per week for at least 30 minutes.       Depression Screening and Follow-up Plan: Patient was screened for depression during today's encounter. They screened negative with a PHQ-9 score of 4.        Return in about 6 months (around 8/27/2024) for Recheck.     Chief Complaint:     Chief Complaint   Patient presents with    Physical Exam     Annual. No concerns      History of Present Illness:     Adult Annual Physical   Patient here for a comprehensive physical exam. The patient reports no problems.    Diet and Physical Activity  Diet/Nutrition: poor diet, frequent junk food, and limited fruits/vegetables.   Exercise: no formal  exercise.      Depression Screening  PHQ-2/9 Depression Screening    Little interest or pleasure in doing things: 0 - not at all  Feeling down, depressed, or hopeless: 0 - not at all  Trouble falling or staying asleep, or sleeping too much: 3 - nearly every day  Feeling tired or having little energy: 0 - not at all  Poor appetite or overeatin - several days  Feeling bad about yourself - or that you are a failure or have let yourself or your family down: 0 - not at all  Trouble concentrating on things, such as reading the newspaper or watching television: 0 - not at all  Moving or speaking so slowly that other people could have noticed. Or the opposite - being so fidgety or restless that you have been moving around a lot more than usual: 0 - not at all  Thoughts that you would be better off dead, or of hurting yourself in some way: 0 - not at all  PHQ-9 Score: 4  PHQ-9 Interpretation: No or Minimal depression       General Health  Sleep: sleeps well and gets 7-8 hours of sleep on average.   Hearing: normal - bilateral.  Vision: vision problems: Rt eye blind, most recent eye exam >1 year ago, and wears glasses.   Dental: regular dental visits, brushes teeth once daily, and does not floss.        Health  Symptoms include: none    Advanced Care Planning  Do you have an advanced directive? Has a will  Do you have a durable medical power of ? no  ACP document given to patient? no     Review of Systems:     Review of Systems   Constitutional:  Negative for chills and fever.   HENT:  Negative for ear pain and sore throat.    Eyes:  Negative for pain and visual disturbance.   Respiratory:  Negative for cough and shortness of breath.    Cardiovascular:  Negative for chest pain and palpitations.   Gastrointestinal:  Negative for abdominal pain and vomiting.   Genitourinary:  Negative for dysuria and hematuria.   Musculoskeletal:  Negative for arthralgias and back pain.   Skin:  Negative for color change and rash.    Neurological:  Negative for seizures and syncope.   All other systems reviewed and are negative.     Past Medical History:     Past Medical History:   Diagnosis Date    Anxiety     H/O concussion     Head injury     Hypertension     Ingrown toenail 4/12/2023      Past Surgical History:     Past Surgical History:   Procedure Laterality Date    CORNEAL TRANSPLANT      EYE SURGERY      TONSILLECTOMY        Family History:     Family History   Problem Relation Age of Onset    Hypertension Father     Dementia Maternal Grandfather     Dementia Maternal Grandmother       Social History:     Social History     Socioeconomic History    Marital status: /Civil Union     Spouse name: None    Number of children: 3    Years of education: None    Highest education level: Associate degree: occupational, technical, or vocational program   Occupational History     Employer: [employer] [address] [place] [address]   Tobacco Use    Smoking status: Former    Smokeless tobacco: Never   Vaping Use    Vaping status: Never Used   Substance and Sexual Activity    Alcohol use: Not Currently     Alcohol/week: 17.0 standard drinks of alcohol     Types: 17 Standard drinks or equivalent per week    Drug use: Never    Sexual activity: Yes     Partners: Female     Birth control/protection: Female Sterilization   Other Topics Concern    None   Social History Narrative    None     Social Determinants of Health     Financial Resource Strain: Not on file   Food Insecurity: Not on file   Transportation Needs: Not on file   Physical Activity: Not on file   Stress: Not on file   Social Connections: Not on file   Intimate Partner Violence: Not on file   Housing Stability: Not on file      Current Medications:     Current Outpatient Medications   Medication Sig Dispense Refill    ascorbic acid (VITAMIN C) 250 MG tablet Take 1 tablet (250 mg total) by mouth daily 30 tablet 1    aspirin (ECOTRIN LOW STRENGTH) 81 mg EC tablet Take 1 tablet (81 mg total) by mouth  "daily 30 tablet 1    atorvastatin (LIPITOR) 40 mg tablet Take 1 tablet (40 mg total) by mouth daily 30 tablet 0    cholecalciferol (VITAMIN D3) 400 units tablet Take 1 tablet (400 Units total) by mouth daily 30 tablet 1    clonazePAM (KlonoPIN) 0.5 mg tablet Take 0.5 mg by mouth 2 (two) times a day PRN      QUEtiapine (SEROquel) 200 mg tablet Take 200 mg by mouth 2 (two) times a day Takes 3 200 mg tablets  at night      Zinc 50 MG TABS Take 50 mg by mouth daily      aspirin 81 mg chewable tablet Chew 81 mg daily Chew and swallow (Patient not taking: Reported on 2/19/2024)      donepezil (ARICEPT) 5 mg tablet Take 5 mg by mouth daily (Patient not taking: Reported on 2/27/2024)      ketoconazole (NIZORAL) 2 % shampoo Apply 1 application topically once for 1 dose (Patient not taking: Reported on 11/7/2023) 1 mL 0     No current facility-administered medications for this visit.      Allergies:     Allergies   Allergen Reactions    Adhesive [Medical Tape] Rash      Physical Exam:     /82 (BP Location: Left arm, Patient Position: Sitting)   Pulse 105   Temp (!) 97.4 °F (36.3 °C) (Tympanic)   Ht 5' 8\" (1.727 m)   Wt 108 kg (238 lb 12.8 oz)   SpO2 96%   BMI 36.31 kg/m²     Physical Exam  Vitals and nursing note reviewed.   Constitutional:       General: He is not in acute distress.     Appearance: He is well-developed. He is obese.   HENT:      Head: Normocephalic and atraumatic.      Right Ear: Tympanic membrane normal.      Left Ear: Tympanic membrane normal.      Nose: Nose normal.      Mouth/Throat:      Mouth: Mucous membranes are moist.      Pharynx: Oropharynx is clear.   Eyes:      Comments: Right eye blind, left eye normal   Cardiovascular:      Rate and Rhythm: Normal rate and regular rhythm.      Heart sounds: No murmur heard.  Pulmonary:      Effort: Pulmonary effort is normal. No respiratory distress.      Breath sounds: Normal breath sounds.   Abdominal:      Palpations: Abdomen is soft.      " Tenderness: There is no abdominal tenderness.   Musculoskeletal:         General: No swelling.      Cervical back: Neck supple.      Right lower leg: No edema.      Left lower leg: No edema.   Skin:     General: Skin is warm and dry.      Capillary Refill: Capillary refill takes less than 2 seconds.   Neurological:      Mental Status: He is alert and oriented to person, place, and time.      Gait: Gait normal.   Psychiatric:         Mood and Affect: Mood normal.          Angelica Martin MD  [facility]

## 2024-03-11 DIAGNOSIS — E78.5 HLD (HYPERLIPIDEMIA): ICD-10-CM

## 2024-03-11 NOTE — TELEPHONE ENCOUNTER
Pt called in asking if you wanted to keep him at the 40 mg of his Lipitor or if you wouldn't to increase the dose. He does need a refill of the medication.    Please advise

## 2024-03-12 RX ORDER — ATORVASTATIN CALCIUM 40 MG/1
40 TABLET, FILM COATED ORAL DAILY
Qty: 30 TABLET | Refills: 3 | Status: SHIPPED | OUTPATIENT
Start: 2024-03-12

## 2024-04-17 ENCOUNTER — TELEPHONE (OUTPATIENT)
Age: 44
End: 2024-04-17

## 2024-04-17 NOTE — TELEPHONE ENCOUNTER
Writer Left  in regards to the wait list for neuropsychological services. Writer was attempting to see if patient still wished to remain on the wait list of if patient would like to be taken off the wait list.  left contact # [phone]

## 2024-04-18 NOTE — TELEPHONE ENCOUNTER
Patient returned writers call. Patient stated we can remove him from wait list. Do to he was receiving services elsewhere. Writer then removed patient from wait list

## 2024-05-24 ENCOUNTER — OFFICE VISIT (OUTPATIENT)
Dept: NEUROLOGY | Facility: CLINIC | Age: 44
End: 2024-05-24
Payer: COMMERCIAL

## 2024-05-24 VITALS
SYSTOLIC BLOOD PRESSURE: 132 MMHG | HEART RATE: 88 BPM | OXYGEN SATURATION: 96 % | HEIGHT: 68 IN | TEMPERATURE: 97.4 F | BODY MASS INDEX: 34.51 KG/M2 | DIASTOLIC BLOOD PRESSURE: 79 MMHG | WEIGHT: 227.7 LBS

## 2024-05-24 DIAGNOSIS — R41.3 IMPAIRED MEMORY: Primary | ICD-10-CM

## 2024-05-24 PROCEDURE — 99213 OFFICE O/P EST LOW 20 MIN: CPT | Performed by: PSYCHIATRY & NEUROLOGY

## 2024-05-24 RX ORDER — LANOLIN ALCOHOL/MO/W.PET/CERES
500 CREAM (GRAM) TOPICAL DAILY
COMMUNITY
Start: 2023-11-21

## 2024-05-24 NOTE — PROGRESS NOTES
Outgoing call made to Shantel to f/u with regards to EBT card. LMTCB to discuss or contact Hayde to discuss.   Review of Systems   Constitutional:  Negative for appetite change, fatigue and fever.   HENT: Negative.  Negative for hearing loss, tinnitus, trouble swallowing and voice change.    Eyes: Negative.  Negative for photophobia, pain and visual disturbance.   Respiratory: Negative.  Negative for shortness of breath.    Cardiovascular: Negative.  Negative for palpitations.   Gastrointestinal: Negative.  Negative for nausea and vomiting.   Endocrine: Negative.  Negative for cold intolerance.   Genitourinary: Negative.  Negative for dysuria, frequency and urgency.   Musculoskeletal:  Negative for back pain, gait problem, myalgias, neck pain and neck stiffness.   Skin: Negative.  Negative for rash.   Allergic/Immunologic: Negative.    Neurological: Negative.  Negative for dizziness, tremors, seizures, syncope, facial asymmetry, speech difficulty, weakness, light-headedness, numbness and headaches.   Hematological: Negative.  Does not bruise/bleed easily.   Psychiatric/Behavioral: Negative.  Negative for confusion, hallucinations and sleep disturbance.    All other systems reviewed and are negative.

## 2024-05-24 NOTE — PROGRESS NOTES
Patient ID: Eyal Celis is a 43 y.o. male.    Assessment/Plan:    Impaired memory  Eyal Celis is a pleasant 43 y.o. 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    To review initial visit, symptom was of gradual onset in 2017 after having 4 concussions prior to this.  In 2021 patient had an acute psychosis episode induced by prednisone and Cymbalta.  Since then patient had a rapid decline.  During our initial visit patient was followed at the [facility] and was maintained on clonazepam 0.5 mg twice daily and Seroquel 600 mg nightly.  Symptoms included forgetfulness, constant reminders, short-term memory difficulties, frequent repetition and changes in personality.  Patient became more withdrawn and had poor interaction with his wife and 3 kids.  Patient at this time is unemployed and spends most of his day sleeping.  He was independent in all of his activities of daily living.  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  Initial MoCA 26/30.    Symptoms thought to be multifactorial with components of being on multiple neurotoxic medications, history of trauma to the head and sedentary lifestyle as he was not working at the time.  I recommended a consolidate stabilizers with minimal side effects.  I recommended discontinuing Aricept given little benefit in his condition.  Recommended discontinuing Klonopin as it could contribute to problems with his short-term memory.    I referred patient for neuropsychiatric testing which was not consistent with any specific cognitive disorder.  He has some mild visual attentional problems but not clearly rising to the level of the disorder.   Overall, symptoms thought to be multifactorial as well involving his history of head trauma, mood issues, fatigue, heavy alcohol use, hypervigilance of his symptoms and medication side effects.  I repeated an MRI brain which showed stable chronic cerebellar infarct without any acute changes.    Today patient reports that he is doing better natively.  Has brought his Seroquel down to 400 mg at bedtime.  He returned to work in March as a  and has some periods where he has difficulty focusing and difficulty with math.  He has lost 26 pounds since I last saw him.  Which I highly encouraged.    At this time, I am not concerned for any organic neurodegenerative process.  I agree with the neuropsych team that his symptoms are multifactorial as stated above.     Plan:  Decrease vitamin B-12 intake to 100 mcg daily  Recommend continue working closely with psychiatry to reduce psychiatric medication side effects  Lifestyle recommendations:  Regular Physical Exercise: Physical activity has been linked to improved cognitive function and reduced risk of cognitive decline. Aim for a combination of aerobic exercise (such as walking, swimming, or cycling) and strength training exercises several times a week.  Healthy Diet: A balanced diet rich in fruits, vegetables, whole grains, lean proteins, and healthy fats can support brain health. Foods high in antioxidants, omega-3 fatty acids, and vitamins B, C, D, and E may have neuroprotective effects.  Mental Stimulation: Engage in activities that challenge your brain, such as reading, puzzles, crossword puzzles, learning a new language, playing musical instruments, or engaging in hobbies that require problem-solving and critical thinking.  Social Engagement: Maintain social connections and participate in social activities with friends, family, and community groups. Social interaction can help reduce stress, improve mood, and stimulate cognitive function.  Quality Sleep:  Prioritize good sleep hygiene and aim for 7-9 hours of quality sleep per night. Poor sleep can impair cognitive function and memory consolidation.  Stress Management: Practice stress-reduction techniques such as mindfulness, meditation, deep breathing exercises, or yoga to manage stress levels. Chronic stress can have detrimental effects on cognitive function.  Intellectual Curiosity: Stay curious and open to learning new things throughout life. Explore new topics, take up new hobbies or interests, and continue to challenge yourself intellectually.  Maintain Overall Health: Manage chronic health conditions such as hypertension, diabetes, and high cholesterol through regular medical check-ups, medication adherence, and lifestyle modifications. These conditions can impact cognitive function if left untreated.  Limit Alcohol and Avoid Smoking: Excessive alcohol consumption and smoking can have negative effects on brain health and increase the risk of cognitive decline. Limit alcohol intake and avoid smoking to promote brain health.  Stay mentally and socially active: Engage in activities that stimulate your mind and maintain social connections. This could include volunteering, joining clubs or groups, participating in community events, or taking up new hobbies or interests.  Follow up in 6 months       Diagnoses and all orders for this visit:    Impaired memory    Other orders  -     vitamin B-12 (VITAMIN B-12) 1,000 mcg tablet       Subjective:  Eyal Celis is a pleasant 43 y.o. 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    Initial visit:     Today wife reports most of the history.  Onset of symptoms was in 2017 after having 4 concussions prior to this.  Symptoms at the time were gradual.  In 2021 patient was  started on prednisone and Cymbalta which induced a psychosis.  Since then patient has had a rapid decline.  Patient has been following with a psychiatrist and is currently maintained on clonazepam 0.5 mg twice daily and Seroquel 600 mg daily at bedtime.  Patient's symptoms include forgetfulness needing constant reminders, short-term memory difficulties, constant repetition and changes in personality.  Patient is now more withdrawn and has poor interaction with his wife and 3 kids.  Patient is independent for the most part in all of his ADLs.  He spends most of his day sleeping.  He uses a CPAP for his PHUC and is compliant.  He has had some weight gain but this is likely due to the Seroquel.MoCA today 26/30.  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     At this time, I believe changes in patient's cognition are multifactorial.  First and foremost, patient is on multiple neurotoxic medications.  I discussed and suggested that they work with his psychiatrist to get him on medications that stabilize his mood but have less side effects.  I recommended discontinuing Aricept as this will likely have little benefit in his condition.  Benzodiazepines can also contribute to his short-term difficulties.  His history of trauma to the head can also play a role in this.  I reviewed his prior imaging from 2021 which was normal with exception of the chronic right cerebellar stroke.  Given worsening of symptoms I will repeat MRI brain.  I placed a referral for neuropsych testing to better assess components contributing to his cognition.  I will check blood work for heavy metals given his exposure at work as well as vitamins.  I discussed activities that promote healthy cognitive aging.  I will see him again in 6 months to allow time for work-up to be done.      Workup:    MRI BRAIN W WO CONTRAST (Final) 12/22/2023  1:33  "PM    Impression  No mass effect, acute intracranial hemorrhage or evidence of recent infarction. No abnormal parenchymal or leptomeningeal enhancement identified.    Chronic appearing right cerebellar lacunar infarct appears stable since 6/22/2021.      Neuropsychiatric testing (5/7/2024): \"The results of the patient's neuropsychological evaluation are not consistent with the presence of any specific cognitive disorder.  He does present with some mild visual but not auditory attentional problems, not clearly rising to a level of disorder, as well as some weakness encoding information to memory when only a single encoding opportunity is given (this represents weakness, but not necessarily a disorder).  It is difficult to say why this is occurring and I cannot rule out his history of many reported concussions as an explanation for these weaknesses or, perhaps, history of heavy alcohol use or medication side effects as well.  The patient is reporting significantly more memory problems that the results of this evaluation demonstrate, but this could be related to various factors, such as fatigue, medication side effects, mood issues, hypervigilance about his health symptoms etc.\"      Interval history:    Seroquel down to 400 mg   Cognitive   253-> 227  Returned to work in March  PHUC CPAP compliant. Follow up 9/25  Works as a . Sometimes difficulty focusing and difficulty with math        The following portions of the patient's history were reviewed and updated as appropriate: allergies, current medications, past family history, past medical history, past social history, past surgical history, and problem list and review of systems.         Objective:    Blood pressure 132/79, pulse 88, temperature (!) 97.4 °F (36.3 °C), temperature source Temporal, height 5' 8\" (1.727 m), weight 103 kg (227 lb 11.2 oz), SpO2 96%.    Physical Exam    Neurological Exam      ROS:    Review of Systems    Review of Systems "   Constitutional:  Negative for appetite change, fatigue and fever.   HENT: Negative.  Negative for hearing loss, tinnitus, trouble swallowing and voice change.    Eyes: Negative.  Negative for photophobia, pain and visual disturbance.   Respiratory: Negative.  Negative for shortness of breath.    Cardiovascular: Negative.  Negative for palpitations.   Gastrointestinal: Negative.  Negative for nausea and vomiting.   Endocrine: Negative.  Negative for cold intolerance.   Genitourinary: Negative.  Negative for dysuria, frequency and urgency.   Musculoskeletal:  Negative for back pain, gait problem, myalgias, neck pain and neck stiffness.   Skin: Negative.  Negative for rash.   Allergic/Immunologic: Negative.    Neurological: Negative.  Negative for dizziness, tremors, seizures, syncope, facial asymmetry, speech difficulty, weakness, light-headedness, numbness and headaches.   Hematological: Negative.  Does not bruise/bleed easily.   Psychiatric/Behavioral: Negative.  Negative for confusion, hallucinations and sleep disturbance.    All other systems reviewed and are negative.

## 2024-05-24 NOTE — ASSESSMENT & PLAN NOTE
Eyal Celis is a pleasant 43 y.o. 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    To review initial visit, symptom was of gradual onset in 2017 after having 4 concussions prior to this.  In 2021 patient had an acute psychosis episode induced by prednisone and Cymbalta.  Since then patient had a rapid decline.  During our initial visit patient was [facility] and was maintained on clonazepam 0.5 mg twice daily and Seroquel 600 mg nightly.  Symptoms included forgetfulness, constant reminders, short-term memory difficulties, frequent repetition and changes in personality.  Patient became more withdrawn and had poor interaction with his wife and 3 kids.  Patient at this time is unemployed and spends most of his day sleeping.  He was independent in all of his activities of daily living.  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  Initial MoCA 26/30.    Symptoms thought to be multifactorial with components of being on multiple neurotoxic medications, history of trauma to the head and sedentary lifestyle as he was not working at the time.  I recommended a consolidate stabilizers with minimal side effects.  I recommended discontinuing Aricept given little benefit in his condition.  Recommended discontinuing Klonopin as it could contribute to problems with his short-term memory.    I referred patient for neuropsychiatric testing which was not consistent with any specific cognitive disorder.  He has some mild visual attentional problems but not clearly rising to the level of the disorder.  Overall, symptoms thought to be multifactorial as well involving his history of head trauma,  mood issues, fatigue, heavy alcohol use, hypervigilance of his symptoms and medication side effects.  I repeated an MRI brain which showed stable chronic cerebellar infarct without any acute changes.    Today patient reports that he is doing better natively.  Has brought his Seroquel down to 400 mg at bedtime.  He returned to work in March as a  and has some periods where he has difficulty focusing and difficulty with math.  He has lost 26 pounds since I last saw him.  Which I highly encouraged.    At this time, I am not concerned for any organic neurodegenerative process.  I agree with the neuropsych team that his symptoms are multifactorial as stated above.     Plan:  Decrease vitamin B-12 intake to 100 mcg daily  Recommend continue working closely with psychiatry to reduce psychiatric medication side effects  Lifestyle recommendations:  Regular Physical Exercise: Physical activity has been linked to improved cognitive function and reduced risk of cognitive decline. Aim for a combination of aerobic exercise (such as walking, swimming, or cycling) and strength training exercises several times a week.  Healthy Diet: A balanced diet rich in fruits, vegetables, whole grains, lean proteins, and healthy fats can support brain health. Foods high in antioxidants, omega-3 fatty acids, and vitamins B, C, D, and E may have neuroprotective effects.  Mental Stimulation: Engage in activities that challenge your brain, such as reading, puzzles, crossword puzzles, learning a new language, playing musical instruments, or engaging in hobbies that require problem-solving and critical thinking.  Social Engagement: Maintain social connections and participate in social activities with friends, family, and community groups. Social interaction can help reduce stress, improve mood, and stimulate cognitive function.  Quality Sleep: Prioritize good sleep hygiene and aim for 7-9 hours of quality sleep per night. Poor sleep can  impair cognitive function and memory consolidation.  Stress Management: Practice stress-reduction techniques such as mindfulness, meditation, deep breathing exercises, or yoga to manage stress levels. Chronic stress can have detrimental effects on cognitive function.  Intellectual Curiosity: Stay curious and open to learning new things throughout life. Explore new topics, take up new hobbies or interests, and continue to challenge yourself intellectually.  Maintain Overall Health: Manage chronic health conditions such as hypertension, diabetes, and high cholesterol through regular medical check-ups, medication adherence, and lifestyle modifications. These conditions can impact cognitive function if left untreated.  Limit Alcohol and Avoid Smoking: Excessive alcohol consumption and smoking can have negative effects on brain health and increase the risk of cognitive decline. Limit alcohol intake and avoid smoking to promote brain health.  Stay mentally and socially active: Engage in activities that stimulate your mind and maintain social connections. This could include volunteering, joining clubs or groups, participating in community events, or taking up new hobbies or interests.  Follow up in 6 months

## 2024-07-22 DIAGNOSIS — E78.5 HLD (HYPERLIPIDEMIA): ICD-10-CM

## 2024-07-23 RX ORDER — ATORVASTATIN CALCIUM 40 MG/1
40 TABLET, FILM COATED ORAL DAILY
Qty: 30 TABLET | Refills: 5 | Status: SHIPPED | OUTPATIENT
Start: 2024-07-23

## 2024-07-30 ENCOUNTER — APPOINTMENT (OUTPATIENT)
Dept: LAB | Facility: MEDICAL CENTER | Age: 44
End: 2024-07-30
Payer: COMMERCIAL

## 2024-07-30 DIAGNOSIS — R74.01 TRANSAMINITIS: Primary | ICD-10-CM

## 2024-07-30 DIAGNOSIS — R73.03 PREDIABETES: ICD-10-CM

## 2024-07-30 DIAGNOSIS — Z12.5 SCREENING FOR PROSTATE CANCER: ICD-10-CM

## 2024-07-30 LAB
ALBUMIN SERPL BCG-MCNC: 4.5 G/DL (ref 3.5–5)
ALP SERPL-CCNC: 63 U/L (ref 34–104)
ALT SERPL W P-5'-P-CCNC: 24 U/L (ref 7–52)
ANION GAP SERPL CALCULATED.3IONS-SCNC: 10 MMOL/L (ref 4–13)
AST SERPL W P-5'-P-CCNC: 20 U/L (ref 13–39)
BILIRUB SERPL-MCNC: 1.57 MG/DL (ref 0.2–1)
BUN SERPL-MCNC: 11 MG/DL (ref 5–25)
CALCIUM SERPL-MCNC: 9.1 MG/DL (ref 8.4–10.2)
CHLORIDE SERPL-SCNC: 103 MMOL/L (ref 96–108)
CO2 SERPL-SCNC: 28 MMOL/L (ref 21–32)
CREAT SERPL-MCNC: 0.9 MG/DL (ref 0.6–1.3)
EST. AVERAGE GLUCOSE BLD GHB EST-MCNC: 114 MG/DL
GFR SERPL CREATININE-BSD FRML MDRD: 104 ML/MIN/1.73SQ M
GLUCOSE P FAST SERPL-MCNC: 103 MG/DL (ref 65–99)
HAV IGM SER QL: NORMAL
HBA1C MFR BLD: 5.6 %
HBV CORE IGM SER QL: NORMAL
HBV SURFACE AG SER QL: NORMAL
HCV AB SER QL: NORMAL
POTASSIUM SERPL-SCNC: 3.6 MMOL/L (ref 3.5–5.3)
PROT SERPL-MCNC: 6.9 G/DL (ref 6.4–8.4)
PSA SERPL-MCNC: 0.62 NG/ML (ref 0–4)
SODIUM SERPL-SCNC: 141 MMOL/L (ref 135–147)

## 2024-07-30 PROCEDURE — 80074 ACUTE HEPATITIS PANEL: CPT

## 2024-07-30 PROCEDURE — G0103 PSA SCREENING: HCPCS

## 2024-07-30 PROCEDURE — 80053 COMPREHEN METABOLIC PANEL: CPT

## 2024-07-30 PROCEDURE — 83036 HEMOGLOBIN GLYCOSYLATED A1C: CPT

## 2024-07-30 PROCEDURE — 36415 COLL VENOUS BLD VENIPUNCTURE: CPT

## 2024-08-09 ENCOUNTER — RA CDI HCC (OUTPATIENT)
Dept: OTHER | Facility: HOSPITAL | Age: 44
End: 2024-08-09

## 2024-08-19 ENCOUNTER — OFFICE VISIT (OUTPATIENT)
Dept: FAMILY MEDICINE CLINIC | Facility: CLINIC | Age: 44
End: 2024-08-19
Payer: COMMERCIAL

## 2024-08-19 VITALS
HEIGHT: 68 IN | WEIGHT: 205 LBS | TEMPERATURE: 100.7 F | DIASTOLIC BLOOD PRESSURE: 82 MMHG | HEART RATE: 105 BPM | SYSTOLIC BLOOD PRESSURE: 124 MMHG | OXYGEN SATURATION: 97 % | BODY MASS INDEX: 31.07 KG/M2

## 2024-08-19 DIAGNOSIS — R41.3 IMPAIRED MEMORY: ICD-10-CM

## 2024-08-19 DIAGNOSIS — E78.5 HYPERLIPIDEMIA, UNSPECIFIED HYPERLIPIDEMIA TYPE: ICD-10-CM

## 2024-08-19 DIAGNOSIS — I10 BENIGN ESSENTIAL HTN: ICD-10-CM

## 2024-08-19 DIAGNOSIS — R19.05 PERIUMBILICAL MASS: Primary | ICD-10-CM

## 2024-08-19 DIAGNOSIS — F19.959 SUBSTANCE-INDUCED PSYCHOTIC DISORDER (HCC): ICD-10-CM

## 2024-08-19 DIAGNOSIS — Z00.00 ANNUAL PHYSICAL EXAM: Primary | ICD-10-CM

## 2024-08-19 PROCEDURE — 99214 OFFICE O/P EST MOD 30 MIN: CPT

## 2024-08-19 NOTE — PROGRESS NOTES
"Ambulatory Visit  Name: Eyal Celis      : 1980      MRN: [id number]  Encounter Provider: JOSE ALBERTO Hill  Encounter Date: 2024   Encounter department: [facility]    Assessment & Plan   1. Periumbilical mass  -     US abdominal wall; Future; Expected date: 2024  2. 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HCC)  Comments:  Follows with psych monthly.  3. Impaired memory  Comments:  Following with Neuro. Notes reviewed.  4. Benign essential HTN  Comments:  At goal. Continue to monitor.  5. Hyperlipidemia, unspecified hyperlipidemia type  Comments:  Labs reviewed.       History of Present Illness       6 mo f/u/review labs  Seen by PCP 2024 for annual physical  Labs reviewed with pt   States he would like to be checked for umbilical hernia since he has lost 40+ lb and now has protrusion in stomach      Review of Systems   Constitutional:  Negative for chills, fatigue and fever.   HENT:  Negative for congestion, ear discharge, ear pain, facial swelling, rhinorrhea, sinus pressure, sinus pain, sore throat and trouble swallowing.    Eyes:  Negative for photophobia, pain and visual disturbance.   Respiratory:  Negative for cough, chest tightness, shortness of breath and wheezing.    Cardiovascular:  Negative for chest pain, palpitations and leg swelling.   Gastrointestinal:  Negative for abdominal pain, diarrhea, nausea and vomiting.   Genitourinary:  Negative for dysuria, flank pain and hematuria.   Musculoskeletal:  Negative for arthralgias, back pain, myalgias and neck pain.   Skin:  Negative for pallor and wound.   Neurological:  Negative for dizziness, syncope, weakness, numbness and headaches.   Psychiatric/Behavioral:  Negative for confusion and sleep disturbance.        Objective     /82 (BP Location: Left arm, Patient Position: Sitting)   Pulse 105   Temp (!) 100.7 °F (38.2 °C) (Tympanic)   Ht 5' 8\" (1.727 m)   Wt 93 kg (205 lb)   SpO2 97%   BMI " 31.17 kg/m²     Physical Exam  Vitals and nursing note reviewed.   Constitutional:       General: He is not in acute distress.     Appearance: Normal appearance. He is well-developed. He is not ill-appearing.   HENT:      Head: Normocephalic and atraumatic.      Jaw: There is normal jaw occlusion.      Right Ear: Tympanic membrane normal.      Left Ear: Tympanic membrane normal.      Nose: Nose normal.      Mouth/Throat:      Pharynx: Oropharynx is clear. No oropharyngeal exudate or posterior oropharyngeal erythema.   Eyes:      Extraocular Movements: Extraocular movements intact.      Conjunctiva/sclera: Conjunctivae normal.   Neck:      Thyroid: No thyroid mass.      Vascular: No carotid bruit or JVD.      Trachea: Trachea and phonation normal.   Cardiovascular:      Rate and Rhythm: Normal rate and regular rhythm.      Heart sounds: S1 normal and S2 normal. No murmur heard.  Pulmonary:      Effort: Pulmonary effort is normal. No tachypnea or respiratory distress.      Breath sounds: Normal breath sounds and air entry. No stridor. No decreased breath sounds.   Chest:      Chest wall: No tenderness.   Abdominal:      General: Bowel sounds are normal. There is no distension.      Palpations: Abdomen is soft.      Tenderness: There is no abdominal tenderness. There is no right CVA tenderness or left CVA tenderness.   Musculoskeletal:         General: No swelling.      Cervical back: Normal range of motion and neck supple.      Right lower leg: No edema.      Left lower leg: No edema.   Lymphadenopathy:      Cervical: No cervical adenopathy.   Skin:     General: Skin is warm and dry.      Capillary Refill: Capillary refill takes less than 2 seconds.      Findings: No rash.   Neurological:      General: No focal deficit present.      Mental Status: He is alert and oriented to person, place, and time.      Cranial Nerves: Cranial nerves 2-12 are intact.      Sensory: Sensation is intact.      Motor: Motor function is  intact.      Coordination: Coordination is intact.      Gait: Gait is intact.   Psychiatric:         Mood and Affect: Mood normal.         Behavior: Behavior is cooperative.       Administrative Statements

## 2024-08-21 ENCOUNTER — NURSE TRIAGE (OUTPATIENT)
Dept: OTHER | Facility: OTHER | Age: 44
End: 2024-08-21

## 2024-08-21 ENCOUNTER — TELEMEDICINE (OUTPATIENT)
Dept: FAMILY MEDICINE CLINIC | Facility: CLINIC | Age: 44
End: 2024-08-21
Payer: COMMERCIAL

## 2024-08-21 DIAGNOSIS — U07.1 COVID: Primary | ICD-10-CM

## 2024-08-21 PROCEDURE — 99213 OFFICE O/P EST LOW 20 MIN: CPT | Performed by: NURSE PRACTITIONER

## 2024-08-21 NOTE — PROGRESS NOTES
COVID-19 Outpatient Progress Note  Name: Eyal Celis      : 1980      MRN: [id number]  Encounter Provider: JOSE ALBERTO Alberto  Encounter Date: 2024   Encounter department: [facility]    Assessment & Plan   {There are no diagnoses linked to this encounter. (Refresh or delete this SmartLink)}  COVID-19 Plan       Encounter provider: JOSE ALBERTO Alberto     Provider located at: [facility]  [address] [facility] [address] [phone]     Recent Visits  Date Type Provider Dept   24 Office Visit JOSE ALBERTO Hill [facility]   Showing recent visits within past 7 days and meeting all other requirements  Future Appointments  No visits were found meeting these conditions.  Showing future appointments within next 150 days and meeting all other requirements    History of Present Illness   {Disappearing Hyperlinks I Encounters * My Last Note * Since Last Visit * History :17159}   COVID-19 HPI  Lab Results   Component Value Date    SARSCOV2 Negative 2021       Review of Systems  Objective   {Disappearing Hyperlinks   Review Vitals * Enter New Vitals * Results Review * Labs * Imaging * Cardiology * Procedures * Lung Cancer Screening :04783}  There were no vitals taken for this visit.    Physical Exam

## 2024-08-21 NOTE — TELEPHONE ENCOUNTER
"Reason for Disposition   [1] COVID-19 diagnosed by positive lab test (e.g., PCR, rapid self-test kit) AND [2] mild symptoms (e.g., cough, fever, others) AND [3] no complications or SOB    Answer Assessment - Initial Assessment Questions  1. COVID-19 DIAGNOSIS: \"Who made your COVID-19 diagnosis?\" \"Was it confirmed by a positive lab test or self-test?\" If not diagnosed by a doctor (or NP/PA), ask \"Are there lots of cases (community spread) where you live?\" Note: See public health department website, if unsure.      Tested positive yesterday  Was seen at urgent care     3. ONSET: \"When did the COVID-19 symptoms start?\"       Monday    4. WORST SYMPTOM: \"What is your worst symptom?\" (e.g., cough, fever, shortness of breath, muscle aches)      Headache and dizziness     5. COUGH: \"Do you have a cough?\" If Yes, ask: \"How bad is the cough?\"        Yes     6. FEVER: \"Do you have a fever?\" If Yes, ask: \"What is your temperature, how was it measured, and when did it start?\"      Low grade fever Monday    7. RESPIRATORY STATUS: \"Describe your breathing?\" (e.g., shortness of breath, wheezing, unable to speak)       No    8. BETTER-SAME-WORSE: \"Are you getting better, staying the same or getting worse compared to yesterday?\"  If getting worse, ask, \"In what way?\"      Same     9. HIGH RISK DISEASE: \"Do you have any chronic medical problems?\" (e.g., asthma, heart or lung disease, weak immune system, obesity, etc.)      Borderline diabetes     10. VACCINE: \"Have you had the COVID-19 vaccine?\" If Yes, ask: \"Which one, how many shots, when did you get it?\"        No    11. BOOSTER: \"Have you received your COVID-19 booster?\" If Yes, ask: \"Which one and when did you get it?\"        No    Protocols used: Coronavirus (COVID-19) Diagnosed or Suspected-ADULT-AH    "

## 2024-08-21 NOTE — PROGRESS NOTES
COVID-19 Outpatient Progress Note  Name: Eyal Celis      : 1980      MRN: [id number]  Encounter Provider: JOSE ALBERTO Alberto  Encounter Date: 2024   Encounter department: [facility]    Assessment & Plan   1. COVID    Disposition:     Patient was advised that they can go back to your normal activities when, for at least 24 hours, both are true: their symptoms are getting better overall and they have not had a fever (and are not using fever-reducing medication).    When going back to your normal activities, take added precaution over the next 5 days, such as taking additional steps for  air, hygiene, masks, physical distancing, and/or testing when you will be around other people indoors. You may still be able to spread the virus that made you sick, even if you are feeling better.    If patient develops a fever or starts to feel worse after they have gone back to normal activities, stay home and away from others again until, for at least 24 hours, both are true: symptoms are improving overall and they have not had a fever (and are not using fever-reducing medication). Then take added precaution for the next 5 days.      Discussed symptom directed medication options with patient. Discussed vitamin D, vitamin C, and/or zinc supplementation with patient.     I have spent a total time of 12 minutes on the day of the encounter for this patient including          Encounter provider: JOSE ALBERTO Alberto     Provider located at: [address] [address] [phone]     Recent Visits  Date Type Provider Dept   24 Office Visit JOSE ALBERTO Hill [facility]   Showing recent visits within past 7 days and meeting all other requirements  Today's Visits  Date Type Provider Dept   24 Telemedicine JOSE ALBERTO Alberto [facility]   Showing today's visits and meeting all  other requirements  Future Appointments  No visits were found meeting these conditions.  Showing future appointments within next 150 days and meeting all other requirements    History of Present Illness      This virtual check-in was done via [employer] Embedded and patient was informed that this is a secure, HIPAA-compliant platform. He agrees to proceed.    Patient agrees to participate in a virtual check in via telephone or video visit instead of presenting to the office to address urgent/immediate medical needs. Patient is aware this is a billable service. He acknowledged consent and understanding of privacy and security of the video platform. The patient has agreed to participate and understands they can discontinue the visit at any time.    After connecting through frestyl, the patient was identified by name and date of birth. Eyal Celis was informed that this was a telemedicine visit and that the exam was being conducted confidentially over secure lines. My office door was closed. No one else was in the room. Eyal Celis acknowledged consent and understanding of privacy and security of the telemedicine visit. I informed the patient that I have reviewed his record in Epic and presented the opportunity for him to ask any questions regarding the visit today. The patient agreed to participate.     Verification of patient location:  Patient is located in the following state in which I hold an active license: [place]    Subjective:   Eyal Celis is a 43 y.o. male who has been screened for COVID-19. Symptom change since last report: unchanged. Patient's symptoms include fever, fatigue, nasal congestion, rhinorrhea, sore throat, cough, shortness of breath and headache. Patient denies chills, malaise, anosmia, loss of taste, chest tightness, abdominal pain, nausea, vomiting, diarrhea and myalgias.     - Date of symptom onset: 8/19/2024  - Date of positive COVID-19 test: 8/20/2024. Type of test: Rapid antigen.      COVID-19 vaccination status: Not vaccinated    Eyal has been staying home and has isolated themselves in his home. He is taking care to not share personal items and is cleaning all surfaces that are touched often, like counters, tabletops, and doorknobs using household cleaning sprays or wipes. He is wearing a mask when he leaves his room.     COVID symptoms that started Monday night.  He is experiencing nasal congestion, rhinorrhea, postnasal drip, very mild feeling winded when he is moving around a lot but no other shortness of breath, headache, sinus pressure, fevers up to 100.7 and a cough.  He has been taking Tylenol and Advil and this is helping with his symptoms.  Did speak with patient regarding Paxlovid as his BMI is 31 but his symptoms are mild at this time so he defers any antivirals.  He is instructed that he would have had to stop his Lipitor while taking this and possible side effects of antivirals reviewed with patient.  Signs and symptoms of when he should call or proceed to the ER reviewed with patient and he verbalized understanding.    Lab Results   Component Value Date    SARSCOV2 Negative 06/20/2021       Review of Systems   Constitutional:  Positive for fatigue and fever. Negative for chills.   HENT:  Positive for congestion, rhinorrhea and sore throat.    Respiratory:  Positive for cough and shortness of breath. Negative for chest tightness.    Gastrointestinal:  Negative for abdominal pain, diarrhea, nausea and vomiting.   Musculoskeletal:  Negative for myalgias.   Neurological:  Positive for headaches.     Objective     There were no vitals taken for this visit.    Physical Exam  Constitutional:       Appearance: Normal appearance.   Pulmonary:      Effort: No respiratory distress.   Neurological:      General: No focal deficit present.      Mental Status: He is alert.   Psychiatric:         Mood and Affect: Mood normal.         Behavior: Behavior normal.         Thought Content:  Thought content normal.         Judgment: Judgment normal.

## 2024-08-21 NOTE — TELEPHONE ENCOUNTER
Patient was transferred to me by the access center. They added him onto your schedule as a virtual. He is covid positive as of 8/20. He was tested at an [facility]. He is currently having a headache and sinus congestion. No fever. His wife and his son have asthma so he is concerned for them and was hoping to be prescribed Paxlovid.     Are you ok seeing this patient virtually?

## 2024-08-27 ENCOUNTER — HOSPITAL ENCOUNTER (OUTPATIENT)
Dept: ULTRASOUND IMAGING | Facility: HOSPITAL | Age: 44
Discharge: HOME | End: 2024-08-27
Payer: COMMERCIAL

## 2024-08-27 DIAGNOSIS — R19.05 PERIUMBILICAL MASS: ICD-10-CM

## 2024-08-27 PROCEDURE — 76705 ECHO EXAM OF ABDOMEN: CPT

## 2024-09-05 ENCOUNTER — TELEPHONE (OUTPATIENT)
Age: 44
End: 2024-09-05

## 2024-09-05 ENCOUNTER — TELEPHONE (OUTPATIENT)
Dept: FAMILY MEDICINE CLINIC | Facility: CLINIC | Age: 44
End: 2024-09-05

## 2024-09-05 DIAGNOSIS — K42.9 UMBILICAL HERNIA WITHOUT OBSTRUCTION AND WITHOUT GANGRENE: Primary | ICD-10-CM

## 2024-09-05 NOTE — TELEPHONE ENCOUNTER
----- Message from JOSE ALBERTO [place] sent at 9/5/2024 11:28 AM EDT -----  Will place referral to Gen Surg.

## 2024-09-05 NOTE — TELEPHONE ENCOUNTER
Patient saw US results on my chart, Wants to know what's the next step.  If anything needs to be done,he would like to take care of it right away.

## 2024-09-05 NOTE — TELEPHONE ENCOUNTER
Pt called in returning missed call.     Relayed the following message to pt:  ----- Message from JOSE ALBERTO [place] sent at 9/5/2024 11:28 AM EDT -----  Will place referral to Gen Surg.    Pt stated that he cannot see the surgeon now. He is currently wating to be laid off from work to schedule the appt. Pt states he cannot afford to miss work at this time.     Pt is inquiring if it is ok that he continues to work, as long as he is not lifting heavy when working?    Please advise & call pt back with update on his inquiry. Thank you!    Eyal Celis   [phone]

## 2024-10-08 ENCOUNTER — TELEPHONE (OUTPATIENT)
Dept: SURGERY | Facility: CLINIC | Age: 44
End: 2024-10-08

## 2024-10-08 NOTE — TELEPHONE ENCOUNTER
Called pt and left message stating his appt for 10/10/24 with Dr. Valera has to be reschedule. If he can give our office a call.

## 2024-10-09 ENCOUNTER — TELEPHONE (OUTPATIENT)
Dept: SURGERY | Facility: CLINIC | Age: 44
End: 2024-10-09

## 2024-10-24 ENCOUNTER — TELEPHONE (OUTPATIENT)
Dept: SURGERY | Facility: CLINIC | Age: 44
End: 2024-10-24

## 2024-10-24 NOTE — TELEPHONE ENCOUNTER
Called pt and left message stating he has an appt with Dr. Valera on 11/07/24 that needs to be reschedule to 11/14/24 due to Dr. Valera being in the OR

## 2024-11-14 ENCOUNTER — CONSULT (OUTPATIENT)
Dept: SURGERY | Facility: CLINIC | Age: 44
End: 2024-11-14
Payer: COMMERCIAL

## 2024-11-14 VITALS
DIASTOLIC BLOOD PRESSURE: 100 MMHG | RESPIRATION RATE: 16 BRPM | HEIGHT: 68 IN | TEMPERATURE: 98.6 F | OXYGEN SATURATION: 99 % | SYSTOLIC BLOOD PRESSURE: 160 MMHG | HEART RATE: 101 BPM | BODY MASS INDEX: 29.86 KG/M2 | WEIGHT: 197 LBS

## 2024-11-14 DIAGNOSIS — K42.9 UMBILICAL HERNIA WITHOUT OBSTRUCTION AND WITHOUT GANGRENE: ICD-10-CM

## 2024-11-14 PROCEDURE — 99203 OFFICE O/P NEW LOW 30 MIN: CPT | Performed by: SURGERY

## 2024-11-17 PROBLEM — K42.9 UMBILICAL HERNIA WITHOUT OBSTRUCTION AND WITHOUT GANGRENE: Status: ACTIVE | Noted: 2024-11-17
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
## 2024-11-22 ENCOUNTER — OFFICE VISIT (OUTPATIENT)
Dept: NEUROLOGY | Facility: CLINIC | Age: 44
End: 2024-11-22
Payer: COMMERCIAL

## 2024-11-22 VITALS
BODY MASS INDEX: 29.4 KG/M2 | WEIGHT: 194 LBS | OXYGEN SATURATION: 97 % | HEIGHT: 68 IN | TEMPERATURE: 99 F | HEART RATE: 107 BPM | SYSTOLIC BLOOD PRESSURE: 140 MMHG | DIASTOLIC BLOOD PRESSURE: 84 MMHG

## 2024-11-22 DIAGNOSIS — Z86.73 CHRONIC ARTERIAL ISCHEMIC STROKE: ICD-10-CM

## 2024-11-22 DIAGNOSIS — R41.3 IMPAIRED MEMORY: Primary | ICD-10-CM

## 2024-11-22 PROCEDURE — 99214 OFFICE O/P EST MOD 30 MIN: CPT | Performed by: PSYCHIATRY & NEUROLOGY

## 2024-11-22 NOTE — PROGRESS NOTES
Name: Eyal Celis      : 1980      MRN: [id number]  Encounter Provider: Darcy Duvall MD  Encounter Date: 2024   Encounter department: [facility] [place]    :  Assessment & Plan      {Ambulatory Patient Instructions (Optional):75977}  History of Present Illness {?Quick Links Encounters * My Last Note * Last Note in Specialty * Snapshot * Since Last Visit * History :44466}  HPI  Review of Systems   Constitutional:  Negative for appetite change, fatigue and fever.   HENT: Negative.  Negative for hearing loss, tinnitus, trouble swallowing and voice change.    Eyes: Negative.  Negative for photophobia, pain and visual disturbance.   Respiratory: Negative.  Negative for shortness of breath.    Cardiovascular: Negative.  Negative for palpitations.   Gastrointestinal: Negative.  Negative for nausea and vomiting.   Endocrine: Negative.  Negative for cold intolerance.   Genitourinary: Negative.  Negative for dysuria, frequency and urgency.   Musculoskeletal:  Negative for back pain, gait problem, myalgias, neck pain and neck stiffness.   Skin: Negative.  Negative for rash.   Allergic/Immunologic: Negative.    Neurological: Negative.  Negative for dizziness, tremors, seizures, syncope, facial asymmetry, speech difficulty, weakness, light-headedness, numbness and headaches.        Pt states his memory has been improving. Very mild. Says his wife left him and he's been having to remember things on his own more.   Hematological: Negative.  Does not bruise/bleed easily.   Psychiatric/Behavioral: Negative.  Negative for confusion, hallucinations and sleep disturbance.    All other systems reviewed and are negative.    I have personally reviewed the MA's review of systems and made changes as necessary.    {Select to insert medical history sections (Optional):36470}     Objective {?Quick Links Trend Vitals * Enter New Vitals * Results Review * Timeline (Adult) * Labs * Imaging *  "Cardiology * Procedures * Lung Cancer Screening * Surgical eConsent :13365}  Ht 5' 8\" (1.727 m)   BMI 29.95 kg/m²     Physical Exam  Neurologic Exam    Results/Data:  I have reviewed the results and images from the *** in detail with the patient.    {SL AMB Radiology Results Review (Optional):68144}    {Administrative / Billing Section (Optional):63745}  "
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
## 2024-11-23 NOTE — ASSESSMENT & PLAN NOTE
Chronic appearing right cerebellar lacunar infarct appears stable since 6/22/2021. Continue ASA and statin

## 2024-12-01 ENCOUNTER — HOSPITAL ENCOUNTER (EMERGENCY)
Facility: HOSPITAL | Age: 44
Discharge: HOME | End: 2024-12-01
Attending: EMERGENCY MEDICINE
Payer: COMMERCIAL

## 2024-12-01 ENCOUNTER — OFFICE VISIT (OUTPATIENT)
Dept: URGENT CARE | Facility: CLINIC | Age: 44
End: 2024-12-01
Payer: COMMERCIAL

## 2024-12-01 VITALS
BODY MASS INDEX: 30.16 KG/M2 | WEIGHT: 199 LBS | TEMPERATURE: 99.1 F | DIASTOLIC BLOOD PRESSURE: 80 MMHG | RESPIRATION RATE: 18 BRPM | OXYGEN SATURATION: 98 % | SYSTOLIC BLOOD PRESSURE: 124 MMHG | HEART RATE: 82 BPM | HEIGHT: 68 IN

## 2024-12-01 VITALS
DIASTOLIC BLOOD PRESSURE: 103 MMHG | HEART RATE: 85 BPM | SYSTOLIC BLOOD PRESSURE: 155 MMHG | OXYGEN SATURATION: 100 % | WEIGHT: 199 LBS | RESPIRATION RATE: 18 BRPM | BODY MASS INDEX: 30.16 KG/M2 | HEIGHT: 68 IN | TEMPERATURE: 98.6 F

## 2024-12-01 DIAGNOSIS — Z78.9 ALCOHOL USE: ICD-10-CM

## 2024-12-01 DIAGNOSIS — R25.1 TREMOR OF BOTH HANDS: Primary | ICD-10-CM

## 2024-12-01 DIAGNOSIS — G25.0 ESSENTIAL TREMOR: Primary | ICD-10-CM

## 2024-12-01 LAB
ANION GAP SERPL CALCULATED.3IONS-SCNC: 9 MMOL/L (ref 4–13)
BASOPHILS # BLD AUTO: 0.01 THOUSANDS/ΜL (ref 0–0.1)
BASOPHILS NFR BLD AUTO: 0 % (ref 0–1)
BUN SERPL-MCNC: 9 MG/DL (ref 5–25)
CALCIUM SERPL-MCNC: 9.1 MG/DL (ref 8.4–10.2)
CHLORIDE SERPL-SCNC: 102 MMOL/L (ref 96–108)
CO2 SERPL-SCNC: 25 MMOL/L (ref 21–32)
CREAT SERPL-MCNC: 0.88 MG/DL (ref 0.6–1.3)
EOSINOPHIL # BLD AUTO: 0.03 THOUSAND/ΜL (ref 0–0.61)
EOSINOPHIL NFR BLD AUTO: 0 % (ref 0–6)
ERYTHROCYTE [DISTWIDTH] IN BLOOD BY AUTOMATED COUNT: 13 % (ref 11.6–15.1)
GFR SERPL CREATININE-BSD FRML MDRD: 104 ML/MIN/1.73SQ M
GLUCOSE SERPL-MCNC: 96 MG/DL (ref 65–140)
HCT VFR BLD AUTO: 46 % (ref 36.5–49.3)
HGB BLD-MCNC: 15.5 G/DL (ref 12–17)
IMM GRANULOCYTES # BLD AUTO: 0.06 THOUSAND/UL (ref 0–0.2)
IMM GRANULOCYTES NFR BLD AUTO: 1 % (ref 0–2)
LYMPHOCYTES # BLD AUTO: 1.54 THOUSANDS/ΜL (ref 0.6–4.47)
LYMPHOCYTES NFR BLD AUTO: 16 % (ref 14–44)
MAGNESIUM SERPL-MCNC: 2.2 MG/DL (ref 1.9–2.7)
MCH RBC QN AUTO: 31.4 PG (ref 26.8–34.3)
MCHC RBC AUTO-ENTMCNC: 33.7 G/DL (ref 31.4–37.4)
MCV RBC AUTO: 93 FL (ref 82–98)
MONOCYTES # BLD AUTO: 0.72 THOUSAND/ΜL (ref 0.17–1.22)
MONOCYTES NFR BLD AUTO: 8 % (ref 4–12)
NEUTROPHILS # BLD AUTO: 7.03 THOUSANDS/ΜL (ref 1.85–7.62)
NEUTS SEG NFR BLD AUTO: 75 % (ref 43–75)
NRBC BLD AUTO-RTO: 0 /100 WBCS
PLATELET # BLD AUTO: 264 THOUSANDS/UL (ref 149–390)
PMV BLD AUTO: 8.7 FL (ref 8.9–12.7)
POTASSIUM SERPL-SCNC: 3.7 MMOL/L (ref 3.5–5.3)
RBC # BLD AUTO: 4.93 MILLION/UL (ref 3.88–5.62)
SODIUM SERPL-SCNC: 136 MMOL/L (ref 135–147)
TSH SERPL DL<=0.05 MIU/L-ACNC: 0.85 UIU/ML (ref 0.45–4.5)
WBC # BLD AUTO: 9.39 THOUSAND/UL (ref 4.31–10.16)

## 2024-12-01 PROCEDURE — 80048 BASIC METABOLIC PNL TOTAL CA: CPT | Performed by: EMERGENCY MEDICINE

## 2024-12-01 PROCEDURE — 83735 ASSAY OF MAGNESIUM: CPT | Performed by: EMERGENCY MEDICINE

## 2024-12-01 PROCEDURE — 93005 ELECTROCARDIOGRAM TRACING: CPT

## 2024-12-01 PROCEDURE — 99284 EMERGENCY DEPT VISIT MOD MDM: CPT | Performed by: EMERGENCY MEDICINE

## 2024-12-01 PROCEDURE — 85025 COMPLETE CBC W/AUTO DIFF WBC: CPT | Performed by: EMERGENCY MEDICINE

## 2024-12-01 PROCEDURE — 36415 COLL VENOUS BLD VENIPUNCTURE: CPT | Performed by: EMERGENCY MEDICINE

## 2024-12-01 PROCEDURE — 84443 ASSAY THYROID STIM HORMONE: CPT | Performed by: EMERGENCY MEDICINE

## 2024-12-01 PROCEDURE — 99213 OFFICE O/P EST LOW 20 MIN: CPT | Performed by: NURSE PRACTITIONER

## 2024-12-01 PROCEDURE — 99284 EMERGENCY DEPT VISIT MOD MDM: CPT

## 2024-12-01 RX ORDER — PROPRANOLOL HYDROCHLORIDE 10 MG/1
10 TABLET ORAL 2 TIMES DAILY PRN
Qty: 10 TABLET | Refills: 0 | Status: SHIPPED | OUTPATIENT
Start: 2024-12-01

## 2024-12-01 RX ORDER — BACITRACIN, NEOMYCIN, POLYMYXIN B 400; 3.5; 5 [USP'U]/G; MG/G; [USP'U]/G
1 OINTMENT TOPICAL ONCE
Status: COMPLETED | OUTPATIENT
Start: 2024-12-01 | End: 2024-12-01

## 2024-12-01 RX ADMIN — BACITRACIN ZINC, NEOMYCIN, POLYMYXIN B SULFAT 1 SMALL APPLICATION: 5000; 3.5; 4 OINTMENT TOPICAL at 17:44
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
## 2024-12-01 NOTE — PATIENT INSTRUCTIONS
Follow up with your PCP in 3-5 days  Go to the ED if symptoms worsen - or go tonight for evaluation as this could be related to alcohol withdraw  You are to call your neurologist in the AM for appointment
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
## 2024-12-01 NOTE — DISCHARGE INSTRUCTIONS
Return if symptoms worsen or new symptoms develop.  Call your family physician to schedule a follow-up. A prescription for propanolol, a low dose beta blocker, which may help with your symptoms

## 2024-12-02 ENCOUNTER — TELEPHONE (OUTPATIENT)
Dept: FAMILY MEDICINE CLINIC | Facility: CLINIC | Age: 44
End: 2024-12-02

## 2024-12-02 LAB
ATRIAL RATE: 73 BPM
P AXIS: 24 DEGREES
PR INTERVAL: 146 MS
QRS AXIS: 49 DEGREES
QRSD INTERVAL: 84 MS
QT INTERVAL: 388 MS
QTC INTERVAL: 427 MS
T WAVE AXIS: 7 DEGREES
VENTRICULAR RATE: 73 BPM

## 2024-12-02 PROCEDURE — 93010 ELECTROCARDIOGRAM REPORT: CPT | Performed by: INTERNAL MEDICINE

## 2024-12-02 NOTE — TELEPHONE ENCOUNTER
12/02/24 2:40 PM    Patient contacted post ED visit, [facility] phone outreaches documented. Patient called practice and scheduled a follow-up ED visit. F/u as needed    Thank you.  Dav Nielsen MA  [facility]

## 2025-01-17 DIAGNOSIS — E78.5 HLD (HYPERLIPIDEMIA): ICD-10-CM

## 2025-01-17 RX ORDER — ATORVASTATIN CALCIUM 40 MG/1
40 TABLET, FILM COATED ORAL DAILY
Qty: 30 TABLET | Refills: 5 | Status: SHIPPED | OUTPATIENT
Start: 2025-01-17

## 2025-01-29 ENCOUNTER — VBI (OUTPATIENT)
Dept: ADMINISTRATIVE | Facility: OTHER | Age: 45
End: 2025-01-29

## 2025-03-29 ENCOUNTER — APPOINTMENT (OUTPATIENT)
Dept: LAB | Facility: CLINIC | Age: 45
End: 2025-03-29
Payer: COMMERCIAL

## 2025-03-29 DIAGNOSIS — Z00.00 ANNUAL PHYSICAL EXAM: ICD-10-CM

## 2025-03-29 LAB
ALBUMIN SERPL BCG-MCNC: 4.8 G/DL (ref 3.5–5)
ALP SERPL-CCNC: 77 U/L (ref 34–104)
ALT SERPL W P-5'-P-CCNC: 17 U/L (ref 7–52)
ANION GAP SERPL CALCULATED.3IONS-SCNC: 9 MMOL/L (ref 4–13)
AST SERPL W P-5'-P-CCNC: 16 U/L (ref 13–39)
BASOPHILS # BLD AUTO: 0.04 THOUSANDS/ÂΜL (ref 0–0.1)
BASOPHILS NFR BLD AUTO: 1 % (ref 0–1)
BILIRUB SERPL-MCNC: 1.77 MG/DL (ref 0.2–1)
BUN SERPL-MCNC: 13 MG/DL (ref 5–25)
CALCIUM SERPL-MCNC: 9.1 MG/DL (ref 8.4–10.2)
CHLORIDE SERPL-SCNC: 100 MMOL/L (ref 96–108)
CHOLEST SERPL-MCNC: 144 MG/DL (ref ?–200)
CO2 SERPL-SCNC: 31 MMOL/L (ref 21–32)
CREAT SERPL-MCNC: 0.92 MG/DL (ref 0.6–1.3)
EOSINOPHIL # BLD AUTO: 0.1 THOUSAND/ÂΜL (ref 0–0.61)
EOSINOPHIL NFR BLD AUTO: 1 % (ref 0–6)
ERYTHROCYTE [DISTWIDTH] IN BLOOD BY AUTOMATED COUNT: 12.9 % (ref 11.6–15.1)
GFR SERPL CREATININE-BSD FRML MDRD: 100 ML/MIN/1.73SQ M
GLUCOSE P FAST SERPL-MCNC: 105 MG/DL (ref 65–99)
HCT VFR BLD AUTO: 47.4 % (ref 36.5–49.3)
HDLC SERPL-MCNC: 44 MG/DL
HGB BLD-MCNC: 15.9 G/DL (ref 12–17)
IMM GRANULOCYTES # BLD AUTO: 0.04 THOUSAND/UL (ref 0–0.2)
IMM GRANULOCYTES NFR BLD AUTO: 1 % (ref 0–2)
LDLC SERPL CALC-MCNC: 57 MG/DL (ref 0–100)
LYMPHOCYTES # BLD AUTO: 1.52 THOUSANDS/ÂΜL (ref 0.6–4.47)
LYMPHOCYTES NFR BLD AUTO: 18 % (ref 14–44)
MCH RBC QN AUTO: 31.7 PG (ref 26.8–34.3)
MCHC RBC AUTO-ENTMCNC: 33.5 G/DL (ref 31.4–37.4)
MCV RBC AUTO: 95 FL (ref 82–98)
MONOCYTES # BLD AUTO: 0.68 THOUSAND/ÂΜL (ref 0.17–1.22)
MONOCYTES NFR BLD AUTO: 8 % (ref 4–12)
NEUTROPHILS # BLD AUTO: 5.93 THOUSANDS/ÂΜL (ref 1.85–7.62)
NEUTS SEG NFR BLD AUTO: 71 % (ref 43–75)
NONHDLC SERPL-MCNC: 100 MG/DL
NRBC BLD AUTO-RTO: 0 /100 WBCS
PLATELET # BLD AUTO: 244 THOUSANDS/UL (ref 149–390)
PMV BLD AUTO: 9.4 FL (ref 8.9–12.7)
POTASSIUM SERPL-SCNC: 4.2 MMOL/L (ref 3.5–5.3)
PROT SERPL-MCNC: 7.2 G/DL (ref 6.4–8.4)
RBC # BLD AUTO: 5.01 MILLION/UL (ref 3.88–5.62)
SODIUM SERPL-SCNC: 140 MMOL/L (ref 135–147)
TRIGL SERPL-MCNC: 216 MG/DL (ref ?–150)
TSH SERPL DL<=0.05 MIU/L-ACNC: 0.91 UIU/ML (ref 0.45–4.5)
WBC # BLD AUTO: 8.31 THOUSAND/UL (ref 4.31–10.16)

## 2025-03-29 PROCEDURE — 80061 LIPID PANEL: CPT

## 2025-03-29 PROCEDURE — 84443 ASSAY THYROID STIM HORMONE: CPT

## 2025-03-29 PROCEDURE — 80053 COMPREHEN METABOLIC PANEL: CPT

## 2025-03-29 PROCEDURE — 36415 COLL VENOUS BLD VENIPUNCTURE: CPT

## 2025-03-29 PROCEDURE — 85025 COMPLETE CBC W/AUTO DIFF WBC: CPT

## 2025-04-11 ENCOUNTER — OFFICE VISIT (OUTPATIENT)
Dept: FAMILY MEDICINE CLINIC | Facility: CLINIC | Age: 45
End: 2025-04-11
Payer: COMMERCIAL

## 2025-04-11 VITALS
HEIGHT: 68 IN | HEART RATE: 92 BPM | TEMPERATURE: 98.7 F | WEIGHT: 205 LBS | OXYGEN SATURATION: 99 % | DIASTOLIC BLOOD PRESSURE: 80 MMHG | BODY MASS INDEX: 31.07 KG/M2 | SYSTOLIC BLOOD PRESSURE: 118 MMHG

## 2025-04-11 DIAGNOSIS — I10 BENIGN ESSENTIAL HTN: ICD-10-CM

## 2025-04-11 DIAGNOSIS — Z00.00 ANNUAL PHYSICAL EXAM: Primary | ICD-10-CM

## 2025-04-11 DIAGNOSIS — E78.00 HYPERCHOLESTEROLEMIA: ICD-10-CM

## 2025-04-11 PROBLEM — F32.3 SEVERE MAJOR DEPRESSION WITH PSYCHOTIC FEATURES (HCC): Status: RESOLVED | Noted: 2023-01-31 | Resolved: 2025-04-11

## 2025-04-11 PROCEDURE — 99396 PREV VISIT EST AGE 40-64: CPT | Performed by: FAMILY MEDICINE

## 2025-04-11 PROCEDURE — 99213 OFFICE O/P EST LOW 20 MIN: CPT | Performed by: FAMILY MEDICINE

## 2025-04-11 NOTE — PROGRESS NOTES
"Assessment/Plan:    No problem-specific Assessment & Plan notes found for this encounter.       Diagnoses and all orders for this visit:    Annual physical exam    Benign essential HTN  Comments:  no change in the medication    Hypercholesterolemia  Comments:  pt counseled on diet and exercise          PHQ-2/9 Depression Screening                Subjective:      Patient ID: Eyal Celis is a 44 y.o. male.    Pt here for annual physical        The following portions of the patient's history were reviewed and updated as appropriate: allergies, current medications, past family history, past medical history, past social history, past surgical history, and problem list.    Review of Systems   Constitutional: Negative.  Negative for chills, fatigue and fever.   HENT: Negative.  Negative for hearing loss.    Eyes: Negative.  Negative for visual disturbance.   Respiratory:  Negative for shortness of breath and wheezing.    Cardiovascular:  Negative for chest pain and palpitations.   Gastrointestinal:  Negative for abdominal pain, blood in stool, constipation, diarrhea, nausea and vomiting.   Endocrine: Negative.    Genitourinary:  Negative for difficulty urinating and dysuria.   Musculoskeletal:  Negative for arthralgias and myalgias.   Skin: Negative.    Allergic/Immunologic: Negative.    Neurological:  Negative for seizures and syncope.   Hematological:  Negative for adenopathy.   Psychiatric/Behavioral: Negative.           Objective:    /80   Pulse 92   Temp 98.7 °F (37.1 °C)   Ht 5' 8\" (1.727 m)   Wt 93 kg (205 lb)   SpO2 99%   BMI 31.17 kg/m²      Physical Exam  Vitals and nursing note reviewed.   Constitutional:       General: He is not in acute distress.     Appearance: Normal appearance. He is well-developed. He is not ill-appearing, toxic-appearing or diaphoretic.   HENT:      Head: Normocephalic and atraumatic.      Right Ear: Tympanic membrane, ear canal and external ear normal. There is no " impacted cerumen.      Left Ear: Tympanic membrane, ear canal and external ear normal. There is no impacted cerumen.      Nose: Nose normal. No congestion or rhinorrhea.      Mouth/Throat:      Mouth: Mucous membranes are moist.      Pharynx: Oropharynx is clear. No oropharyngeal exudate or posterior oropharyngeal erythema.   Eyes:      General: No scleral icterus.        Right eye: No discharge.         Left eye: No discharge.      Extraocular Movements: Extraocular movements intact.      Conjunctiva/sclera: Conjunctivae normal.      Pupils: Pupils are equal, round, and reactive to light.   Cardiovascular:      Rate and Rhythm: Normal rate and regular rhythm.      Pulses: Normal pulses.      Heart sounds: Normal heart sounds. No murmur heard.     No friction rub. No gallop.   Pulmonary:      Effort: Pulmonary effort is normal. No respiratory distress.      Breath sounds: Normal breath sounds. No wheezing, rhonchi or rales.   Abdominal:      General: Bowel sounds are normal. There is no distension.      Palpations: Abdomen is soft. There is no mass.      Tenderness: There is no abdominal tenderness. There is no guarding or rebound.   Musculoskeletal:         General: No swelling or deformity. Normal range of motion.      Cervical back: Normal range of motion and neck supple. No rigidity.      Right lower leg: No edema.      Left lower leg: No edema.   Lymphadenopathy:      Cervical: No cervical adenopathy.   Skin:     General: Skin is warm and dry.      Findings: No rash.   Neurological:      General: No focal deficit present.      Mental Status: He is alert and oriented to person, place, and time.      Sensory: No sensory deficit.      Motor: No weakness.      Coordination: Coordination normal.      Gait: Gait normal.      Deep Tendon Reflexes: Reflexes are normal and symmetric. Reflexes normal.   Psychiatric:         Mood and Affect: Mood normal.         Behavior: Behavior normal.         Thought Content: Thought  content normal.         Judgment: Judgment normal.

## 2025-05-23 ENCOUNTER — OFFICE VISIT (OUTPATIENT)
Dept: NEUROLOGY | Facility: CLINIC | Age: 45
End: 2025-05-23

## 2025-05-23 VITALS
HEIGHT: 68 IN | TEMPERATURE: 99 F | BODY MASS INDEX: 30.62 KG/M2 | WEIGHT: 202 LBS | SYSTOLIC BLOOD PRESSURE: 122 MMHG | HEART RATE: 88 BPM | DIASTOLIC BLOOD PRESSURE: 80 MMHG | OXYGEN SATURATION: 98 %

## 2025-05-23 DIAGNOSIS — F41.9 ANXIETY: ICD-10-CM

## 2025-05-23 DIAGNOSIS — L29.9 ITCHING: Primary | ICD-10-CM

## 2025-05-23 DIAGNOSIS — G25.0 ESSENTIAL TREMOR: ICD-10-CM

## 2025-05-23 RX ORDER — HYDROXYZINE HYDROCHLORIDE 25 MG/1
25 TABLET, FILM COATED ORAL 3 TIMES DAILY PRN
Qty: 90 TABLET | Refills: 6 | Status: SHIPPED | OUTPATIENT
Start: 2025-05-23 | End: 2025-05-23

## 2025-05-23 RX ORDER — PROPRANOLOL HYDROCHLORIDE 10 MG/1
20 TABLET ORAL 2 TIMES DAILY
Qty: 60 TABLET | Refills: 6 | Status: SHIPPED | OUTPATIENT
Start: 2025-05-23

## 2025-05-23 RX ORDER — PROPRANOLOL HYDROCHLORIDE 10 MG/1
20 TABLET ORAL 2 TIMES DAILY
Qty: 10 TABLET | Refills: 0 | Status: SHIPPED | OUTPATIENT
Start: 2025-05-23 | End: 2025-05-23

## 2025-05-23 RX ORDER — HYDROXYZINE HYDROCHLORIDE 25 MG/1
25 TABLET, FILM COATED ORAL 3 TIMES DAILY PRN
Qty: 90 TABLET | Refills: 6 | Status: SHIPPED | OUTPATIENT
Start: 2025-05-23

## 2025-05-23 RX ORDER — AMPICILLIN TRIHYDRATE 250 MG
50 CAPSULE ORAL
COMMUNITY

## 2025-05-23 NOTE — ASSESSMENT & PLAN NOTE
Orders:    hydrOXYzine HCL (ATARAX) 25 mg tablet; Take 1 tablet (25 mg total) by mouth 3 (three) times a day as needed for itching or anxiety

## 2025-05-23 NOTE — PROGRESS NOTES
Name: Eyal Celis      : 1980      MRN: [id number]  Encounter Provider: Darcy Duvall MD  Encounter Date: 2025   Encounter department: [facility] [place]  :  Assessment & Plan  Essential tremor  Patient reports he stopped drinking a little more than 5 months ago. His tremor still persists. He ran out of propranolol refills and stopped taking. Will increase propranolol to 20 mg bid as he states that 10 mg wasn't helping much. Follow up in 6 months.   Orders:    propranolol (INDERAL) 10 mg tablet; Take 2 tablets (20 mg total) by mouth 2 (two) times a day    Anxiety    Orders:    hydrOXYzine HCL (ATARAX) 25 mg tablet; Take 1 tablet (25 mg total) by mouth 3 (three) times a day as needed for itching or anxiety    Itching  Patient reports generalized itching throughout the body. He has a generalized pink skin rash. Will prescribed hydroxyzine 25 mg tid prn to help with itching as well as anxiety.   Orders:    hydrOXYzine HCL (ATARAX) 25 mg tablet; Take 1 tablet (25 mg total) by mouth 3 (three) times a day as needed for itching or anxiety          History of Present Illness   HPI     Eyal Celis is a very pleasant 44 y.o. 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     Initial visit (2023):     Onset of symptoms was in 2017 after having 4 concussions prior to this.  Symptoms at the time were gradual.  In  patient was started on prednisone and Cymbalta which induced a psychosis.  Since then patient has had a rapid decline.  Patient has been following with a psychiatrist and is currently maintained on clonazepam 0.5 mg twice daily and Seroquel 600 mg daily at bedtime.  Patient's symptoms include forgetfulness needing constant reminders, short-term memory  difficulties, constant repetition and changes in personality.  Patient is now more withdrawn and has poor interaction with his wife and 3 kids.  Patient is independent for the most part in all of his ADLs.  He spends most of his day sleeping.  He uses a CPAP for his PHUC and is compliant.  He has had some weight gain but this is likely due to the Seroquel.MoCA today 26/30.  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     At this time, I believe changes in patient's cognition are multifactorial.  First and foremost, patient is on multiple neurotoxic medications.  I discussed and suggested that they work with his psychiatrist to get him on medications that stabilize his mood but have less side effects.  I recommended discontinuing Aricept as this will likely have little benefit in his condition.  Benzodiazepines can also contribute to his short-term difficulties.  His history of trauma to the head can also play a role in this.  I reviewed his prior imaging from 2021 which was normal with exception of the chronic right cerebellar stroke.  Given worsening of symptoms I will repeat MRI brain.  I placed a referral for neuropsych testing to better assess components contributing to his cognition.  I will check blood work for heavy metals given his exposure at work as well as vitamins.  I discussed activities that promote healthy cognitive aging.  I will see him again in 6 months to allow time for work-up to be done.     Prior encounters:     05/24/2024: Today patient reports that he is doing better natively.  Has brought his Seroquel down to 400 mg at bedtime.  He returned to work in March as a  and has some periods where he has difficulty focusing and difficulty with math.  He has lost 26 pounds since I last saw him.  Which I highly encouraged. At this time, I am not concerned for any organic neurodegenerative  "process.  I agree with the neuropsych team that his symptoms are multifactorial as stated above.    11/22/2024: MRI brain noncontributory. Stable chronic cerebellar infarct. Patient initially on multiple psychotropic medications. Has been able to make progress tapering off. Patient today reports improvement in memory. Has loss weight with diet. Patient currently undergoing a lot of stressors with his personal life. He is being maintained on Klonopin 0.5 mg bid and seroquel 200 mg qhs. Counseled on limiting ETOH use. Advised not to mix klonopin and alcohol. Counseled on alcohol cessation. Follow up in 6 months.     Workup:     MRI BRAIN W WO CONTRAST (Final) 12/22/2023  1:33 PM     Impression  No mass effect, acute intracranial hemorrhage or evidence of recent infarction. No abnormal parenchymal or leptomeningeal enhancement identified.     Chronic appearing right cerebellar lacunar infarct appears stable since 6/22/2021.        Neuropsychiatric testing (5/7/2024): \"The results of the patient's neuropsychological evaluation are not consistent with the presence of any specific cognitive disorder.  He does present with some mild visual but not auditory attentional problems, not clearly rising to a level of disorder, as well as some weakness encoding information to memory when only a single encoding opportunity is given (this represents weakness, but not necessarily a disorder).  It is difficult to say why this is occurring and I cannot rule out his history of many reported concussions as an explanation for these weaknesses or, perhaps, history of heavy alcohol use or medication side effects as well.  The patient is reporting significantly more memory problems that the results of this evaluation demonstrate, but this could be related to various factors, such as fatigue, medication side effects, mood issues, hypervigilance about his health symptoms etc.\"        Interval history:  Tremors have been worsening   Quit " "drinking       Review of Systems   Constitutional:  Negative for appetite change, fatigue and fever.   HENT: Negative.  Negative for hearing loss, tinnitus, trouble swallowing and voice change.    Eyes: Negative.  Negative for photophobia, pain and visual disturbance.   Respiratory: Negative.  Negative for shortness of breath.    Cardiovascular: Negative.  Negative for palpitations.   Gastrointestinal: Negative.  Negative for nausea and vomiting.   Endocrine: Negative.  Negative for cold intolerance.   Genitourinary: Negative.  Negative for dysuria, frequency and urgency.   Musculoskeletal:  Negative for back pain, gait problem, myalgias, neck pain and neck stiffness.   Skin: Negative.  Negative for rash.   Allergic/Immunologic: Negative.    Neurological:  Negative for dizziness, tremors, seizures, syncope, facial asymmetry, speech difficulty, weakness, light-headedness, numbness and headaches.   Hematological: Negative.  Does not bruise/bleed easily.   Psychiatric/Behavioral: Negative.  Negative for confusion, hallucinations and sleep disturbance.    All other systems reviewed and are negative.   I have personally reviewed the MA's review of systems and made changes as necessary.         Objective   Ht 5' 8\" (1.727 m)   BMI 31.17 kg/m²     Physical Exam  Neurological Exam          "

## 2025-07-03 DIAGNOSIS — E78.5 HLD (HYPERLIPIDEMIA): ICD-10-CM

## 2025-07-03 RX ORDER — ATORVASTATIN CALCIUM 40 MG/1
40 TABLET, FILM COATED ORAL DAILY
Qty: 30 TABLET | Refills: 5 | Status: SHIPPED | OUTPATIENT
Start: 2025-07-03

## 2025-07-03 NOTE — TELEPHONE ENCOUNTER
Reason for call:   [x] Refill   [] Prior Auth  [] Other:     Office:   [x] PCP/Provider - Heraclio Vazquez  [] Specialty/Provider -     Medication: Atorvastatin    Dose/Frequency: 40 mg Daily     Quantity: 30    [facility]    Local Pharmacy   Does the patient have enough for 3 days?   [] Yes   [x] No - Send as HP to POD    Mail Away Pharmacy   Does the patient have enough for 10 days?   [] Yes   [] No - Send as HP to POD

## 2025-07-18 DIAGNOSIS — G25.0 ESSENTIAL TREMOR: ICD-10-CM

## 2025-07-21 RX ORDER — PROPRANOLOL HYDROCHLORIDE 10 MG/1
20 TABLET ORAL 2 TIMES DAILY
Qty: 60 TABLET | Refills: 6 | Status: SHIPPED | OUTPATIENT
Start: 2025-07-21